# Patient Record
Sex: FEMALE | Race: WHITE | Employment: OTHER | URBAN - METROPOLITAN AREA
[De-identification: names, ages, dates, MRNs, and addresses within clinical notes are randomized per-mention and may not be internally consistent; named-entity substitution may affect disease eponyms.]

---

## 2017-03-08 ENCOUNTER — HOSPITAL ENCOUNTER (OUTPATIENT)
Age: 82
Setting detail: SPECIMEN
Discharge: HOME | End: 2017-03-08
Payer: MEDICARE

## 2017-03-10 LAB — DERMATOLOGY PATHOLOGY REPORT: NORMAL

## 2021-06-18 PROCEDURE — 99283 EMERGENCY DEPT VISIT LOW MDM: CPT

## 2021-06-19 ENCOUNTER — HOSPITAL ENCOUNTER (INPATIENT)
Age: 86
LOS: 1 days | Discharge: HOME | DRG: 884 | End: 2021-06-20
Attending: EMERGENCY MEDICINE | Admitting: FAMILY MEDICINE
Payer: MEDICARE

## 2021-06-19 ENCOUNTER — APPOINTMENT (OUTPATIENT)
Dept: CT IMAGING | Age: 86
DRG: 884 | End: 2021-06-19
Payer: MEDICARE

## 2021-06-19 ENCOUNTER — APPOINTMENT (OUTPATIENT)
Dept: GENERAL RADIOLOGY | Age: 86
DRG: 884 | End: 2021-06-19
Payer: MEDICARE

## 2021-06-19 DIAGNOSIS — R41.82 ALTERED MENTAL STATUS, UNSPECIFIED ALTERED MENTAL STATUS TYPE: Primary | ICD-10-CM

## 2021-06-19 PROBLEM — E55.9 VITAMIN D DEFICIENCY: Status: ACTIVE | Noted: 2021-06-19

## 2021-06-19 PROBLEM — I10 ESSENTIAL HYPERTENSION: Status: ACTIVE | Noted: 2021-06-19

## 2021-06-19 PROBLEM — R41.0 CONFUSION WITH NON-FOCAL NEURO EXAM: Status: ACTIVE | Noted: 2021-06-19

## 2021-06-19 PROBLEM — E78.5 HLD (HYPERLIPIDEMIA): Status: ACTIVE | Noted: 2021-06-19

## 2021-06-19 LAB
ABSOLUTE EOS #: 0.69 K/UL (ref 0–0.44)
ABSOLUTE IMMATURE GRANULOCYTE: 0.01 K/UL (ref 0–0.3)
ABSOLUTE LYMPH #: 1.7 K/UL (ref 1.1–3.7)
ABSOLUTE MONO #: 0.78 K/UL (ref 0.1–1.2)
ANION GAP SERPL CALCULATED.3IONS-SCNC: 12 MMOL/L (ref 9–17)
BASOPHILS # BLD: 1 % (ref 0–2)
BASOPHILS ABSOLUTE: 0.06 K/UL (ref 0–0.2)
BILIRUBIN URINE: NEGATIVE
BUN BLDV-MCNC: 15 MG/DL (ref 8–23)
BUN/CREAT BLD: 15 (ref 9–20)
CALCIUM SERPL-MCNC: 9.1 MG/DL (ref 8.6–10.4)
CHLORIDE BLD-SCNC: 103 MMOL/L (ref 98–107)
CHP ED QC CHECK: NORMAL
CO2: 25 MMOL/L (ref 20–31)
COLOR: YELLOW
COMMENT UA: NORMAL
CREAT SERPL-MCNC: 1.02 MG/DL (ref 0.5–0.9)
DIFFERENTIAL TYPE: ABNORMAL
EOSINOPHILS RELATIVE PERCENT: 12 % (ref 1–4)
ETHANOL PERCENT: <0.01 %
ETHANOL: <10 MG/DL
GFR AFRICAN AMERICAN: >60 ML/MIN
GFR NON-AFRICAN AMERICAN: 52 ML/MIN
GFR SERPL CREATININE-BSD FRML MDRD: ABNORMAL ML/MIN/{1.73_M2}
GFR SERPL CREATININE-BSD FRML MDRD: ABNORMAL ML/MIN/{1.73_M2}
GLUCOSE BLD-MCNC: 96 MG/DL (ref 70–99)
GLUCOSE URINE: NEGATIVE
HCT VFR BLD CALC: 40.4 % (ref 36.3–47.1)
HEMOGLOBIN: 12.3 G/DL (ref 11.9–15.1)
IMMATURE GRANULOCYTES: 0 %
KETONES, URINE: NEGATIVE
LEUKOCYTE ESTERASE, URINE: NEGATIVE
LYMPHOCYTES # BLD: 29 % (ref 24–43)
MCH RBC QN AUTO: 30.8 PG (ref 25.2–33.5)
MCHC RBC AUTO-ENTMCNC: 30.4 G/DL (ref 28.4–34.8)
MCV RBC AUTO: 101.3 FL (ref 82.6–102.9)
MONOCYTES # BLD: 13 % (ref 3–12)
NITRITE, URINE: NEGATIVE
NRBC AUTOMATED: ABNORMAL PER 100 WBC
PDW BLD-RTO: 13.3 % (ref 11.8–14.4)
PH UA: 6.5 (ref 5–8)
PLATELET # BLD: 287 K/UL (ref 138–453)
PLATELET ESTIMATE: ABNORMAL
PMV BLD AUTO: 9.6 FL (ref 8.1–13.5)
POTASSIUM SERPL-SCNC: 4.4 MMOL/L (ref 3.7–5.3)
PROTEIN UA: NEGATIVE
RBC # BLD: 3.99 M/UL (ref 3.95–5.11)
RBC # BLD: ABNORMAL 10*6/UL
SEG NEUTROPHILS: 46 % (ref 36–65)
SEGMENTED NEUTROPHILS ABSOLUTE COUNT: 2.73 K/UL (ref 1.5–8.1)
SODIUM BLD-SCNC: 140 MMOL/L (ref 135–144)
SPECIFIC GRAVITY UA: 1.02 (ref 1–1.03)
TURBIDITY: CLEAR
URINE HGB: NEGATIVE
UROBILINOGEN, URINE: NORMAL
WBC # BLD: 6 K/UL (ref 3.5–11.3)
WBC # BLD: ABNORMAL 10*3/UL

## 2021-06-19 PROCEDURE — 92523 SPEECH SOUND LANG COMPREHEN: CPT

## 2021-06-19 PROCEDURE — 87086 URINE CULTURE/COLONY COUNT: CPT

## 2021-06-19 PROCEDURE — 93880 EXTRACRANIAL BILAT STUDY: CPT

## 2021-06-19 PROCEDURE — 6360000002 HC RX W HCPCS: Performed by: NURSE PRACTITIONER

## 2021-06-19 PROCEDURE — G0480 DRUG TEST DEF 1-7 CLASSES: HCPCS

## 2021-06-19 PROCEDURE — 6370000000 HC RX 637 (ALT 250 FOR IP): Performed by: INTERNAL MEDICINE

## 2021-06-19 PROCEDURE — 80048 BASIC METABOLIC PNL TOTAL CA: CPT

## 2021-06-19 PROCEDURE — 1200000000 HC SEMI PRIVATE

## 2021-06-19 PROCEDURE — 2580000003 HC RX 258: Performed by: EMERGENCY MEDICINE

## 2021-06-19 PROCEDURE — 70450 CT HEAD/BRAIN W/O DYE: CPT

## 2021-06-19 PROCEDURE — 71045 X-RAY EXAM CHEST 1 VIEW: CPT

## 2021-06-19 PROCEDURE — 2580000003 HC RX 258: Performed by: NURSE PRACTITIONER

## 2021-06-19 PROCEDURE — 99222 1ST HOSP IP/OBS MODERATE 55: CPT | Performed by: INTERNAL MEDICINE

## 2021-06-19 PROCEDURE — 85025 COMPLETE CBC W/AUTO DIFF WBC: CPT

## 2021-06-19 PROCEDURE — 81003 URINALYSIS AUTO W/O SCOPE: CPT

## 2021-06-19 RX ORDER — CITALOPRAM 10 MG/1
10 TABLET ORAL DAILY
Status: DISCONTINUED | OUTPATIENT
Start: 2021-06-19 | End: 2021-06-20 | Stop reason: HOSPADM

## 2021-06-19 RX ORDER — SODIUM CHLORIDE 0.9 % (FLUSH) 0.9 %
10 SYRINGE (ML) INJECTION EVERY 12 HOURS SCHEDULED
Status: DISCONTINUED | OUTPATIENT
Start: 2021-06-19 | End: 2021-06-20 | Stop reason: HOSPADM

## 2021-06-19 RX ORDER — DONEPEZIL HYDROCHLORIDE 5 MG/1
5 TABLET, FILM COATED ORAL NIGHTLY
Status: ON HOLD | COMMUNITY
End: 2021-11-23

## 2021-06-19 RX ORDER — SODIUM CHLORIDE 9 MG/ML
INJECTION, SOLUTION INTRAVENOUS CONTINUOUS
Status: DISCONTINUED | OUTPATIENT
Start: 2021-06-19 | End: 2021-06-19

## 2021-06-19 RX ORDER — CITALOPRAM 10 MG/1
10 TABLET ORAL DAILY
COMMUNITY

## 2021-06-19 RX ORDER — SODIUM CHLORIDE 9 MG/ML
INJECTION, SOLUTION INTRAVENOUS CONTINUOUS
Status: DISCONTINUED | OUTPATIENT
Start: 2021-06-19 | End: 2021-06-19 | Stop reason: SDUPTHER

## 2021-06-19 RX ORDER — LISINOPRIL 40 MG/1
40 TABLET ORAL DAILY
Status: DISCONTINUED | OUTPATIENT
Start: 2021-06-19 | End: 2021-06-19 | Stop reason: SDUPTHER

## 2021-06-19 RX ORDER — ONDANSETRON 4 MG/1
4 TABLET, ORALLY DISINTEGRATING ORAL EVERY 8 HOURS PRN
Status: DISCONTINUED | OUTPATIENT
Start: 2021-06-19 | End: 2021-06-20 | Stop reason: HOSPADM

## 2021-06-19 RX ORDER — ONDANSETRON 2 MG/ML
4 INJECTION INTRAMUSCULAR; INTRAVENOUS EVERY 6 HOURS PRN
Status: DISCONTINUED | OUTPATIENT
Start: 2021-06-19 | End: 2021-06-20 | Stop reason: HOSPADM

## 2021-06-19 RX ORDER — SODIUM CHLORIDE 0.9 % (FLUSH) 0.9 %
10 SYRINGE (ML) INJECTION PRN
Status: DISCONTINUED | OUTPATIENT
Start: 2021-06-19 | End: 2021-06-20 | Stop reason: HOSPADM

## 2021-06-19 RX ORDER — CLONIDINE HYDROCHLORIDE 0.1 MG/1
0.1 TABLET ORAL NIGHTLY PRN
Status: ON HOLD | COMMUNITY
End: 2021-11-23

## 2021-06-19 RX ORDER — VITAMIN B COMPLEX
400 TABLET ORAL DAILY
Status: DISCONTINUED | OUTPATIENT
Start: 2021-06-19 | End: 2021-06-20 | Stop reason: HOSPADM

## 2021-06-19 RX ORDER — SODIUM CHLORIDE 9 MG/ML
25 INJECTION, SOLUTION INTRAVENOUS PRN
Status: DISCONTINUED | OUTPATIENT
Start: 2021-06-19 | End: 2021-06-20 | Stop reason: HOSPADM

## 2021-06-19 RX ORDER — ATORVASTATIN CALCIUM 20 MG/1
20 TABLET, FILM COATED ORAL DAILY
Status: DISCONTINUED | OUTPATIENT
Start: 2021-06-19 | End: 2021-06-20 | Stop reason: HOSPADM

## 2021-06-19 RX ORDER — LISINOPRIL 40 MG/1
40 TABLET ORAL DAILY
Status: DISCONTINUED | OUTPATIENT
Start: 2021-06-19 | End: 2021-06-20 | Stop reason: HOSPADM

## 2021-06-19 RX ADMIN — SODIUM CHLORIDE: 9 INJECTION, SOLUTION INTRAVENOUS at 00:45

## 2021-06-19 RX ADMIN — Medication 500 UNITS: at 12:20

## 2021-06-19 RX ADMIN — SODIUM CHLORIDE: 9 INJECTION, SOLUTION INTRAVENOUS at 07:47

## 2021-06-19 RX ADMIN — ENOXAPARIN SODIUM 40 MG: 40 INJECTION SUBCUTANEOUS at 12:20

## 2021-06-19 RX ADMIN — SODIUM CHLORIDE, PRESERVATIVE FREE 10 ML: 5 INJECTION INTRAVENOUS at 21:37

## 2021-06-19 RX ADMIN — CITALOPRAM HYDROBROMIDE 10 MG: 10 TABLET ORAL at 12:20

## 2021-06-19 RX ADMIN — LISINOPRIL 40 MG: 40 TABLET ORAL at 12:20

## 2021-06-19 ASSESSMENT — ENCOUNTER SYMPTOMS
BLOOD IN STOOL: 0
PHOTOPHOBIA: 0
WHEEZING: 0
VOMITING: 0
NAUSEA: 0
COUGH: 0
ABDOMINAL DISTENTION: 0
SHORTNESS OF BREATH: 0

## 2021-06-19 ASSESSMENT — PAIN SCALES - GENERAL: PAINLEVEL_OUTOF10: 0

## 2021-06-19 NOTE — ED PROVIDER NOTES
[address] [place] [address] ED  EMERGENCY DEPARTMENT ENCOUNTER      Pt Name: Carmen Jack  MRN: [id number]  [place] 1935  Date of evaluation: 6/18/2021  Provider: Andrey Arellano MD    [address] [place] [address]       Chief Complaint   Patient presents with    Altered Mental Status         HISTORY OF PRESENT ILLNESS  (Location/Symptom, Timing/Onset, Context/Setting, Quality, Duration, Modifying Factors, Severity.)   Carmen Jack is a 81 y.o. female who presents to the emergency department for evaluation of altered mental status. The patient is unaccompanied to the emergency department. She was brought by police. We do not have a good history on her, there are no family members and she is unable to provide us a good history. She had apparently called the police tonight and they checked on her. She thought somebody had broken into her house but they had not and she was brought here because of the  recognized that she was confused. No history of trauma and she does not complain of fever or cough or headache. Its not clear when the symptoms started. No further history is obtainable. Nursing Notes were reviewed. ALLERGIES     Patient has no known allergies. CURRENT MEDICATIONS       Previous Medications    CARBAMIDE PEROXIDE (DEBROX) 6.5 % OTIC SOLUTION    5 drops 2 times daily    HYDROCHLOROTHIAZIDE (HYDRODIURIL) 25 MG TABLET    Take 25 mg by mouth daily    LISINOPRIL (PRINIVIL;ZESTRIL) 40 MG TABLET    Take 40 mg by mouth daily    SIMVASTATIN (ZOCOR) 40 MG TABLET    Take 40 mg by mouth nightly       PAST MEDICAL HISTORY         Diagnosis Date    Hypertension        SURGICAL HISTORY           Procedure Laterality Date    APPENDECTOMY      TUBAL LIGATION           FAMILY HISTORY     History reviewed. No pertinent family history. No family status information on file. SOCIAL HISTORY      reports that she has never smoked.  She has never used smokeless tobacco. She reports current alcohol Complete antibiotic course.  Monitor for spreading rash, fever, body aches, joint pain, etc.      Lyme Disease  Lyme disease is caused by bacteria. The infection is most often passed during the bite of a deer tick. The tick is very small, so many people with Lyme disease do not know they have been bitten. Tests for Lyme disease are not always accurate early in the disease. If the disease is suspected, treatment may begin before testing confirms the infection. A long course of antibiotics is the standard treatment.  If untreated, Lyme disease can worsen and full-body symptoms can develop         Early local symptoms may appear within a few days to a month after the tick bite. These symptoms may include a round, red rash that looks like a bull's-eye target with darker outer ring and a darker center. There may fever, chills, fatigue, body aches, and headache. In time, the rash goes away, even without treatment. That doesn't mean the infection has gone away, however. In some cases, early local symptoms never develop.    Early disseminated symptoms may appear weeks to months after the bite. These can include muscle aches, fatigue, fever, headache, stiff neck, and joint pain and swelling.    Late-stage symptoms include weakness in an arm, leg or one side of the face, headache, fever, and numbness and tingling in the arms or legs, confusion, and memory loss.  Testing is done for the presence of the bacteria. When the infection is treated early, it can be cured. In some cases, a second or third course of antibiotics may be needed. Be sure to follow your healthcare providers directions about treatment.  Home care  If oral antibiotics have been prescribed, take them exactly as directed until they are completely gone. Do not stop taking them until you have taken the full course or your healthcare provider has told you to stop.  Ask your healthcare provider about taking over-the-counter medicines to control symptoms such as  aches and fever.  Follow-up care  Follow up with your healthcare provider as advised. Be sure to return for follow-up testing as directed to be sure the infection has been treated.  When to seek medical advice  Call your healthcare provider right away if any of the following occur:    Current symptoms get worse    Unexplained fever, neck pain or stiffness, or headache    Arm, leg or facial weakness    Joint pain or swelling    Numbness and tingling in the arms or legs    Confusion or memory loss    Irregular or rapid heartbeat  Date Last Reviewed: 9/25/2015 2000-2017 [employer]. [address] [place] [id number]. All rights reserved. This information is not intended as a substitute for professional medical care. Always follow your healthcare professional's instructions.        Preventing Lyme Disease  Ticks are small spider-like arachnids that feed on the blood of animals and humans. They can carry the bacteria that cause Lyme disease. The bacteria can pass to a person from a tick bite. (It is not passed from person to person.) Lyme disease can lead to serious health problems if it is not treated with antibiotics. The best way to prevent Lyme disease is to avoid being bitten by a tick.     The tick that carries Lyme disease is very small about the size of a poppy seed.   Preventing tick bites  The disease is carried by the blacklegged tick, also called a  deer tick.  Young ticks called nymphs are the most likely to carry the bacteria. They are very small about the size of a poppy seed. They can be found on deer, rodents, and birds. Often the animal brushes the tick onto leaves or other plants as it runs through the woods and then the tick lives in bushes, grasses, and dead leaves. The most active time of year for infected ticks varies by region of the country. In the East and Midwest, they are more active from April to September. In the West, they may be more active from December to  February. But you can still be bitten at other times of the year. Below are tips for protecting yourself from tick bites.  Protect your body    Wear clothes that protect you. Clothing can help protect you from tick bites. Wear long pants and long sleeves in outdoor areas where ticks may live. Tuck your shirt into your pants. Tuck pant legs into your socks. And wear light colors so you can more easily see ticks on your clothes.    Use insect repellent. Spray insect repellent containing at least 20 percent DEET on your exposed skin. You can also use it on clothing, shoes, and camping gear. Avoid getting DEET on children s hands, mouth, or eyes. You can use products with permethrin on clothing, shoes, and camping gear. But do not spray permethrin on skin. It will cause a rash. Follow the directions on the package of the spray you use. For more information on bug sprays, visit the National Pesticide Information Center at http://npic.[facility].    Avoid tick-infested areas. Avoid brushing against grasses, bushes, and other plants. Avoid walking through dead leaves and other ground vegetation. Do not sit on fallen logs. And avoid areas with large numbers of deer and rodents.    Check yourself for ticks. After being outdoors, check your clothes and skin for ticks. Keep in mind that the ticks are about the size of a poppy seed. Use both a hand-held and a full-length mirror to view all of your skin. Pay special attention to areas with hair. Make sure to thoroughly check these areas:    Scalp    Behind the ears    Armpits    Belly button    Waist    Groin    Backs of the knees    Use the clothes dryer. Putting clothing or bedding into a clothes dryer for 1 hour at high heat has been shown to kill ticks.  Control ticks around your home    Create tick-free safety zones. Ticks that transmit Lyme disease live in ground vegetation. Ticks crawl onto people from shrubs and grasses in and around wooded areas. Cut bushes and other  cardiologist.    EKG on my interpretation shows no acute findings    RADIOLOGY:   Non-plain film images such as CT, Ultrasound and MRI are read by the radiologist. Plain radiographic images are visualized and preliminarily interpreted by the emergency physician with the below findings:    Interpretation per the Radiologist below, if available at the time of this note:    CT HEAD WO CONTRAST    Result Date: 6/19/2021  EXAMINATION: CT OF THE HEAD WITHOUT CONTRAST  6/19/2021 1:05 am TECHNIQUE: CT of the head was performed without the administration of intravenous contrast. Dose modulation, iterative reconstruction, and/or weight based adjustment of the mA/kV was utilized to reduce the radiation dose to as low as reasonably achievable. COMPARISON: CT head April 25, 2015. HISTORY: ORDERING SYSTEM PROVIDED HISTORY: Altered mental status TECHNOLOGIST PROVIDED HISTORY: Altered mental status Decision Support Exception - unselect if not a suspected or confirmed emergency medical condition->Emergency Medical Condition (MA) Reason for Exam: Brought to ED by police after calling them c/o hearing sounds. A+O to self only. Concern for safety with increased confusion. H/O HTN Acuity: Acute Type of Exam: Initial FINDINGS: BRAIN/VENTRICLES: There is no acute intracranial hemorrhage, mass effect or midline shift. No abnormal extra-axial fluid collection. The gray-white differentiation is maintained without evidence of an acute infarct. There is no evidence of hydrocephalus. Ill-defined hypoattenuation is noted within cerebral white matter consistent with mild microvascular ischemic disease. ORBITS: The visualized portion of the orbits demonstrate no acute abnormality. SINUSES: The visualized paranasal sinuses and mastoid air cells demonstrate no acute abnormality. SOFT TISSUES/SKULL:  No acute abnormality of the visualized skull or soft tissues. No acute intracranial abnormality.  Mild cerebral white matter chronic microvascular plants away from the deck or patio and any child play areas. Keep all grasses cut short. Remove dead leaves and other dead vegetation. Do not put children s play equipment near wooded areas. Put wood chips or gravel on the ground between lawns and wooded areas.    Use pesticides. You can apply them yourself or hire a pest control expert. States have different regulations about pesticides. Ask your local health department for information.    Keep deer away. Deer often carry the ticks that can infect you with Lyme disease. Do not attempt to pet or feed deer. Ask your local [place] center about deer-resistant plants. Ask your local health department about deer control in your area.    Prevent ticks on pets. Pets can bring ticks into your home. Use tick control medicine as advised by your . Check your pet for ticks after it comes indoors. Pay special attention to the ears.    Ask about local tick-control methods. Some states have tick-control programs. Local health departments may be using methods that can help you control ticks at home.  If you have a tick  If you find a tick on your skin, do not panic. Most ticks don't carry Lyme disease. And the tick must be attached for 36 to 48 hours before it might infect you. If you find a tick on you, here s what to do:    If the tick is not yet attached to your skin, remove the tick with tweezers or a tissue. Flush it down the toilet. If you see a tick on your clothes, use a piece of tape to lift it off. Do not touch it with your bare hands.    If the tick is attached to your skin:    Carefully remove the tick with tweezers. If you don t have tweezers, use your fingers protected by a paper towel or a thin cloth. Grasp the tick as close to your skin as possible. Pull slowly and gently to remove the tick. The tick may not let go right away. Do not pull harder. Be patient and keep trying gently. Do not twist the head or body as you pull. Do not crush or squeeze the  body. This can release the bacteria into your body. Never use a hot match, petroleum jelly, or other products to remove a tick. (If you can t remove the tick or if part of the tick remains in the skin, call your healthcare provider right away.)    Wash your skin with soap and water after you remove the tick. This will help ensure you remove any bacteria.    If you can, save the tick in a tightly sealed glass or plastic container. Take it to your healthcare provider. He or she may be able to have someone identify if it is the type of tick that transmits Lyme.    Call your healthcare provider and describe the bite and the tick. You may be asked to come in for an exam. You may be tested for Lyme. You may also be prescribed antibiotics to help prevent infection.    Over the next month, watch for the symptoms below, especially a rash at the site of the bite.  Symptoms of Lyme disease  Call your healthcare provider if you develop any symptoms of Lyme disease, even if you don t remember being bitten. These include:    A round, red rash (called a bull s-eye rash)    Fever and chills    Tiredness or body aches    Headache or a stiff neck    Joint pain and swelling (arthritis), especially in large joints such as the knees   To learn more    Centers for Disease Control and Prevention: www.cdc.gov/lyme    American Lyme Disease Foundation: www.aldf.com  Date Last Reviewed: 11/1/2016 2000-2017 [employer]. [address] [place] [address], [place] [address]. All rights reserved. This information is not intended as a substitute for professional medical care. Always follow your healthcare professional's instructions.         work-up is negative including CAT scan of the brain. She is being admitted for further work-up. CONSULTS:  IP CONSULT TO HOSPITALIST    PROCEDURES:  None    FINAL IMPRESSION      1. Altered mental status, unspecified altered mental status type          DISPOSITION/PLAN   DISPOSITION Decision To Admit 06/19/2021 02:29:10 AM      PATIENT REFERRED TO:   No follow-up provider specified.     DISCHARGE MEDICATIONS:     New Prescriptions    No medications on file         (Please note that portions of this note were completed with a voice recognition program.  Efforts were made to edit the dictations but occasionally words are mis-transcribed.)    Aimee Bond MD  Attending Emergency Physician           Aimee Bond MD  06/19/21 0663

## 2021-06-19 NOTE — PROGRESS NOTES
Speech Language Pathology  Facility/Department: [facility]  Initial Speech/Language/Cognitive Assessment    NAME: Figueroa Armas  : 1935   MRN: [id number]  ADMISSION DATE: 2021     ADMITTING DIAGNOSIS: has Altered mental status; Confusion with non-focal neuro exam; Essential hypertension; HLD (hyperlipidemia); and Vitamin D deficiency on their problem list.     DATE ONSET: 2021      Date of Eval: 2021   Evaluating Therapist: LEIGHA South       RECENT RESULTS  CT OF HEAD/MRI:  [facility] 2021  No acute intracranial abnormality.       Mild cerebral white matter chronic microvascular ischemic disease. Primary Complaint:   Figueroa Armas is a 81 y.o. female who presents to the emergency department for evaluation of altered mental status    According to medical records, pt was brought to [facility] by the [facility] after they responded to call at her house (she thought someone broke in) and the  observed confusion/AMS. On assessment, pt not able to provide details as to what lead to her admission. IMPRESSIONS  1) Pt exhibits moderate cognitive deficits in attention/concentration, immediate/delayed recall, divergent thinking/reasoning skills    2) This is also complicated by significant hearing loss (no observable hearing aides)    3) Clinically, pt appears to be exhibiting WFL oral-pharyngeal swallowing for dry solids and thin liquids. RECOMMENDATIONS  1) Given severity of cognitive impairments, pt needs supervision for managing personal, medical, financial, and legal affairs.      2) She should have 24 hour supervision d/t the severity of the cognitive impairments     3) Pt will benefit from skilled ST/cognitive rehabilitation at the next level of care          Pain:  Pain Assessment  Pain Assessment: 0-10  Pain Level: 0    Assessment:  Cognitive Diagnosis: Moderate Cognitive Deficits   Diagnosis: Moderate Cognitive Deficits, Hard of HEaring    Recommendations:  Requires SLP Intervention: Yes     D/C Recommendations: To be determined       Plan:   Goals:      Patient/family involved in developing goals and treatment plan: Pt is pleasantly confused. She is unable to recall how many children she has or their names      Subjective:   Previous level of function and limitations: Per pt report (not sure of accuracy), she reports she is single (she denied being  or  or ). She said she had 5 children but can only name 1 (Yunior Tomlinson; However medical records appear to suggest Loli Deng may be her grandson). She reports she graduated high school and worked at [employer] prior to having children. 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 Objective:     Oral/Motor  Oral Motor: Within functional limits    Auditory Comprehension  Comprehension: Within Functional Limits         Expression  Primary Mode of Expression: Verbal    Verbal Expression  Verbal Expression: Within functional limits         Motor Speech  Motor Speech: Within Functional Limits         Cognition:      Orientation  Overall Orientation Status: Impaired  Orientation Level: Oriented to place;Oriented to person;Disoriented to time;Disoriented to situation  Attention  Attention: Exceptions to [facility]  Sustained Attention: Mild  Memory  Memory: Exceptions to [facility]  Short-term Memory: Moderate  Working Memory: Mild  Problem Solving  Problem Solving: Exceptions to [facility]  Verbal Reasoning Skills:  Moderate      Prognosis:  Speech Therapy Prognosis  Prognosis: Fair  Prognosis Considerations: Age;Previous Level of Function;Severity of Impairments    Education:  Patient Education Response: Demonstrated understanding;Needs reinforcement          Therapy Time:   Individual Concurrent Group Co-treatment   Time In 1210         Time Out 1244         Minutes [address] [place], [place]  6/19/2021 12:45 PM

## 2021-06-19 NOTE — ED NOTES
Pt to ED c/o altered mental status. Pt was brought in by  after calling them reporting she was hearing sounds.  was concerned and brought her to the hospital to be evaluated. Pt reports that she lives at home alone. Pt is only oriented to self, pt presents to be confused and unable to provide any additional information. Pt denies pain at this time.       Maliha Barragan, RN  06/19/21 9902

## 2021-06-19 NOTE — PROGRESS NOTES
Transitions of Care Pharmacy Service   Medication Review    The patient's list of current home medications has been reviewed. Source(s) of information: Gillian      PROVIDER ACTION REQUESTED  Medications that need to be addressed by a physician/nurse practitioner:    Medication Action Requested   Lisinopril,  Simvastatin   Old prescriptions -- please discontinue as appropriate, otherwise pt will need new prescriptions to resume at discharge. Donepezil and clonidine need review/reorder as appropriate           Please feel free to call me with any questions about this encounter. Thank you.     Jannette Barnes, [address] [place] [address]   Transitions of Care Pharmacy Service  Phone:  [phone]  Fax: [phone]      Electronically signed by Jnanette Barnes [address] [place] [address] on 6/19/2021 at 11:03 AM         Medications Prior to Admission:   citalopram (CELEXA) 10 MG tablet, Take 10 mg by mouth daily  donepezil (ARICEPT) 5 MG tablet, Take 5 mg by mouth nightly  cloNIDine (CATAPRES) 0.1 MG tablet, Take 0.1 mg by mouth nightly as needed

## 2021-06-19 NOTE — CARE COORDINATION
Discharge Planning    Received call back from [facility] and they can not accept patient as they have concerns that she does not have a 24 hr care giver.

## 2021-06-19 NOTE — H&P
[facility]  Office: [address], DO, Jaron Milian, DO, Jono Vitor, DO, Demond Herron, DO, Valente Cummings MD, Shanita Marino MD, Fabiana Whitley MD, Bonnielee Nissen, MD, Melody Ramos MD, Valeriano Butterfield MD, Ursula Silevr MD, Pa Brar MD, Eduardo Mosher, DO, Elicia Rothman MD, Keturah Lopez, DO, Denise Burroughs MD,  Frankie Alarcon, DO, Katy Adams MD, Anshul Lim MD, Geremias Tavarez MD, Arsenio Orantes MD, Jessica Morley, Caryl Weiss, CNP, Roya Justice, CNP, Holyl Dennis, CNS, Pia Cline, CNP, Alejo Lee, CNP, Arnold Addison, CNP, Justa Bunch, CNP, Vish Light, CNP, Marc Olvera PA-C, Iam Paul, [facility], Carl Harris, CNP, Js Galindo, CNP, Renae Rosario, CNP, Maximilian Frederick, CNP, Patricia Moreno, CNP, Demond [facility]          Name: Lisa Valentin  MRN: [id number]     Acct: [de-identified]  Room: 2009/2009-02    Admit Date: 6/19/2021  PCP: Leila Duran MD    Chief Complaint:  Chief Complaint   Patient presents with    Altered Mental Status        History of Present Illness:  Lisa Valentin is a 81 y.o.  female who presents with Altered Mental Status    Patient was admitted thru ER with:  Gina Bedoya is a 81 y.o. female who presents to the emergency department for evaluation of altered mental status. The patient is unaccompanied to the emergency department. She was brought by police. We do not have a good history on her, there are no family members and she is unable to provide us a good history. She had apparently called the police tonight and they checked on her. She thought somebody had broken into her house but they had not and she was brought here because of the  recognized that she was confused. No history of trauma and she does not complain of fever or cough or headache. Its not clear when the symptoms started. No further history is obtainable. \"    Patient MD   hydrochlorothiazide (HYDRODIURIL) 25 MG tablet Take 25 mg by mouth daily    Historical Provider, MD         Allergies:   Patient has no known allergies. Social Hx:  Tobacco:    reports that she has never smoked. She has never used smokeless tobacco.  Alcohol:      reports current alcohol use of about 1.0 standard drinks of alcohol per week. Drug Use:  reports no history of drug use. Family Hx; Family History   Problem Relation Age of Onset    No Known Problems Mother     Heart Disease Father        ROS:  Review of Systems   Constitutional: Positive for activity change (Decreased). Negative for appetite change, chills, diaphoresis, fatigue and fever. HENT: Negative for congestion and nosebleeds. Eyes: Negative for photophobia and visual disturbance. Respiratory: Negative for cough, shortness of breath and wheezing. Cardiovascular: Negative for chest pain and palpitations. Gastrointestinal: Negative for abdominal distention, blood in stool, nausea and vomiting. Genitourinary: Positive for dysuria (Has recently developed some pain on urination). Negative for flank pain and hematuria. Musculoskeletal: Negative for arthralgias and myalgias. Skin: Negative for rash and wound. Neurological: Positive for weakness. Negative for dizziness and light-headedness. The patient reports she has been feeling \"shaky\"   Psychiatric/Behavioral: Positive for confusion (She does acknowledge poor memory) and dysphoric mood. The patient is nervous/anxious. The patient acknowledges that at times she is feeling somewhat depressed and anxious       Physical Exam:  Vitals:  BP (!) 176/82   Pulse 78   Temp 97.9 °F (36.6 °C) (Oral)   Resp 16   Ht 5' 7\" (1.702 m)   Wt 113 lb 0.5 oz (51.3 kg)   SpO2 97%   BMI 17.70 kg/m²   Temp (24hrs), Av.8 °F (36.6 °C), Min:97.5 °F (36.4 °C), Max:98.1 °F (36.7 °C)    Physical Exam  Vitals reviewed.    Constitutional:       General: She is not in acute distress. Appearance: She is not diaphoretic. HENT:      Head: Normocephalic. Nose: Nose normal.   Eyes:      General: No scleral icterus. Conjunctiva/sclera: Conjunctivae normal.   Neck:      Trachea: No tracheal deviation. Cardiovascular:      Rate and Rhythm: Normal rate and regular rhythm. Pulmonary:      Effort: Pulmonary effort is normal. No respiratory distress. Breath sounds: Normal breath sounds. No wheezing or rales. Chest:      Chest wall: No tenderness. Abdominal:      General: Bowel sounds are normal. There is no distension. Palpations: Abdomen is soft. Tenderness: There is no abdominal tenderness. Musculoskeletal:         General: No tenderness. Cervical back: Neck supple. Skin:     General: Skin is warm and dry. Neurological:      Mental Status: She is alert.       Comments: Pleasantly confused  Having trouble with historical data  Year \"unable to gas what year it might be\"  Location \"I do not know\"  President \"Biden\"           Data:  Laboratory Testing:  Recent Results (from the past 24 hour(s))   CBC Auto Differential    Collection Time: 06/19/21 12:30 AM   Result Value Ref Range    WBC 6.0 3.5 - 11.3 k/uL    RBC 3.99 3.95 - 5.11 m/uL    Hemoglobin 12.3 11.9 - 15.1 g/dL    Hematocrit 40.4 36.3 - 47.1 %    .3 82.6 - 102.9 fL    MCH 30.8 25.2 - 33.5 pg    MCHC 30.4 28.4 - 34.8 g/dL    RDW 13.3 11.8 - 14.4 %    Platelets 420 235 - 641 k/uL    MPV 9.6 8.1 - 13.5 fL    NRBC Automated NOT REPORTED 0.0 per 100 WBC    Differential Type NOT REPORTED     WBC Morphology NOT REPORTED     RBC Morphology NOT REPORTED     Platelet Estimate NOT REPORTED     Seg Neutrophils 46 36 - 65 %    Lymphocytes 29 24 - 43 %    Monocytes 13 (H) 3 - 12 %    Eosinophils % 12 (H) 1 - 4 %    Basophils 1 0 - 2 %    Immature Granulocytes 0 0 %    Segs Absolute 2.73 1.50 - 8.10 k/uL    Absolute Lymph # 1.70 1.10 - 3.70 k/uL    Absolute Mono # 0.78 0.10 - 1.20 k/uL    Absolute Eos been discussed with the patient and her grandson/caregiver Christopher Murphy  He feels the patient may need placement  He has a service called to have the patient's home air conditioning repaired   has been consulted     Patient is admitted as inpatient status because of co-morbidities listed above, severity of signs and symptoms as outlined, requirement for current medical therapies and most importantly because of direct risk to patient if care not provided in a hospital setting. Expected length of stay > 48 hours.      Consultations:   IP CONSULT TO HOSPITALIST  IP CONSULT TO SOCIAL WORK    Electronically signed by Shankar Agarwal DO on 6/19/2021 at 9:11 AM

## 2021-06-19 NOTE — PLAN OF CARE
Problem: Falls - Risk of:  Goal: Will remain free from falls  Description: Will remain free from falls  Outcome: Ongoing  Note: Siderails up x 2  Hourly rounding  Call light in reach  Instructed to call for assist before attempting out of bed. Remains free from falls and accidental injury at this time   Floor free from obstacles  Bed is locked and in lowest position  Adequate lighting provided  Bed alarm on, [place] star and Stay with Me signs posted         Problem: Sensory:  Goal: General experience of comfort will improve  Description: General experience of comfort will improve  Outcome: Ongoing  Note: Pt. Complaining of urinary burning and urgency  Patient frequently to bathroom to void  Await urinalysis.

## 2021-06-19 NOTE — CARE COORDINATION
Case Management Initial Discharge  [facility],         Readmission Risk              Risk of Unplanned Readmission:  7             Met with:patient to discuss discharge plans. Information verified: address, contacts, phone number, , insurance Yes  PCP: Kai Moyer MD  Date of last visit: New PCP is Dr Jessica Grossman last saw May 2021. Registration called to update    Insurance Provider: Nay[employer] Cuello [employer] Medicare    Discharge Planning  Current Residence:  Private home  Living Arrangements:  Alone   Home has 1 stories/2 stairs to climb  Support Systems:  Children  Current Services PTA: [employer] Agency: Previous use of [facility]  Patient able to perform ADL's:Assisted  DME in home:  Samir Freddy, cane  DME used to aid ambulation prior to admission:   None in the home. Samir Freddy outside home  DME used during admission:  TBD    Potential Assistance Needed:  [address]:    Potential Assistance Purchasing Medications:  No  Does patient want to participate in local refill/ meds to beds program?       Patient agreeable to home care: Yes  Freedom of choice provided:  yes  The Plan for Transition of Care is related to the following treatment goals: possible need for PT/OT for strengthening and SN for medication teaching and compliance    Migue Serrano was provided with a choice of provider and agrees   with the discharge plan. [x] Yes [] No    Freedom of choice list was provided with basic dialogue that supports the patient's individualized plan of care/goals, treatment preferences and shares the quality data associated with the providers.  [x] Yes [] No      Type of Home Care Services:  Nursing Services, OT, PT  Patient expects to be discharged to:  home    Prior SNF/Rehab Placement and Facility: none  Agreeable to SNF/Rehab: No  [place] of choice provided: n/a   Evaluation: n/a    Expected Discharge date:  21  Follow Up Appointment: Best Day/ Time: Monday AM    Transportation provider: migue Kiersten Godoy  Transportation arrangements needed for discharge: No    Discharge Plan:   Met with patient to review plan of care. Pt was able to state her address and that she lives alone and receives mobile meals. She told me she has a daughter who lives in [facility] and her son Kiersten Godoy helps her. She was unable to state the year and where she is at and why she is here. She gave me permission to call her son Kiersten Godoy. Phone call to Kiersten Godoy and he states his mom sees a neurologist for dementia. He states she can get anxious in the evenings and she attempted to call him and his girlfriend but their phones were on mute so since they did not answer their phones patient called the police. Son states this happened 4 months ago and he has been working with [employer] on alternate living situation. Kiersten Godoy visits his mom daily to give her her medications. He has concerns that she may have a UTI and nurse states she is obtaining a urine specimen today. Pt does not have a Life Alert. Son would be interested in home care and he requests [facility] from previous use. Face sheet faxed to [facility]  PT nattyal is pending. PLAN  Confirm acceptance with [facility]. PT eval pending. UA pending.         Electronically signed by Jess Morrison on 6/19/21 at 10:00 AM EDT

## 2021-06-19 NOTE — PROGRESS NOTES
Pt admitted to room 2009 per bed in stable/confused condition from ED  Oriented to room and surroundings  Bed in lowest position, wheels locked, 2/4 side rails up  Call light in reach, room free of clutter, adequate lighting provided  Denies any further questions at this time  Instructed to call out with any questions/concerns/new onset of pain and/or n/v   White board updated  Continue to monitor with hourly rounding  STAY WITH ME protocol initiated   Bed alarm on/Fall Risk signs in place/Fall risk sticker to wrist band  Non-skid socks on/at bedside.

## 2021-06-20 VITALS
TEMPERATURE: 97.3 F | OXYGEN SATURATION: 95 % | WEIGHT: 113.03 LBS | SYSTOLIC BLOOD PRESSURE: 174 MMHG | BODY MASS INDEX: 17.74 KG/M2 | HEIGHT: 67 IN | HEART RATE: 76 BPM | RESPIRATION RATE: 18 BRPM | DIASTOLIC BLOOD PRESSURE: 88 MMHG

## 2021-06-20 LAB
ALBUMIN SERPL-MCNC: 3.7 G/DL (ref 3.5–5.2)
ALBUMIN/GLOBULIN RATIO: ABNORMAL (ref 1–2.5)
ALP BLD-CCNC: 61 U/L (ref 35–104)
ALT SERPL-CCNC: 6 U/L (ref 5–33)
ANION GAP SERPL CALCULATED.3IONS-SCNC: 12 MMOL/L (ref 9–17)
AST SERPL-CCNC: 14 U/L
BILIRUB SERPL-MCNC: 0.43 MG/DL (ref 0.3–1.2)
BUN BLDV-MCNC: 17 MG/DL (ref 8–23)
BUN/CREAT BLD: 17 (ref 9–20)
CALCIUM SERPL-MCNC: 8.7 MG/DL (ref 8.6–10.4)
CHLORIDE BLD-SCNC: 104 MMOL/L (ref 98–107)
CHOLESTEROL/HDL RATIO: 2.9
CHOLESTEROL: 180 MG/DL
CO2: 23 MMOL/L (ref 20–31)
CREAT SERPL-MCNC: 1.01 MG/DL (ref 0.5–0.9)
CULTURE: NO GROWTH
GFR AFRICAN AMERICAN: >60 ML/MIN
GFR NON-AFRICAN AMERICAN: 52 ML/MIN
GFR SERPL CREATININE-BSD FRML MDRD: ABNORMAL ML/MIN/{1.73_M2}
GFR SERPL CREATININE-BSD FRML MDRD: ABNORMAL ML/MIN/{1.73_M2}
GLUCOSE BLD-MCNC: 90 MG/DL (ref 70–99)
HCT VFR BLD CALC: 39.1 % (ref 36.3–47.1)
HDLC SERPL-MCNC: 62 MG/DL
HEMOGLOBIN: 12.1 G/DL (ref 11.9–15.1)
LDL CHOLESTEROL: 96 MG/DL (ref 0–130)
Lab: NORMAL
MCH RBC QN AUTO: 30.6 PG (ref 25.2–33.5)
MCHC RBC AUTO-ENTMCNC: 30.9 G/DL (ref 28.4–34.8)
MCV RBC AUTO: 99 FL (ref 82.6–102.9)
NRBC AUTOMATED: 0 PER 100 WBC
PDW BLD-RTO: 13.2 % (ref 11.8–14.4)
PLATELET # BLD: 260 K/UL (ref 138–453)
PMV BLD AUTO: 9.5 FL (ref 8.1–13.5)
POTASSIUM SERPL-SCNC: 4 MMOL/L (ref 3.7–5.3)
RBC # BLD: 3.95 M/UL (ref 3.95–5.11)
SODIUM BLD-SCNC: 139 MMOL/L (ref 135–144)
SPECIMEN DESCRIPTION: NORMAL
TOTAL PROTEIN: 6.2 G/DL (ref 6.4–8.3)
TRIGL SERPL-MCNC: 110 MG/DL
VLDLC SERPL CALC-MCNC: NORMAL MG/DL (ref 1–30)
WBC # BLD: 5.4 K/UL (ref 3.5–11.3)

## 2021-06-20 PROCEDURE — 97166 OT EVAL MOD COMPLEX 45 MIN: CPT

## 2021-06-20 PROCEDURE — 97530 THERAPEUTIC ACTIVITIES: CPT

## 2021-06-20 PROCEDURE — 6370000000 HC RX 637 (ALT 250 FOR IP): Performed by: INTERNAL MEDICINE

## 2021-06-20 PROCEDURE — 83036 HEMOGLOBIN GLYCOSYLATED A1C: CPT

## 2021-06-20 PROCEDURE — 6360000002 HC RX W HCPCS: Performed by: NURSE PRACTITIONER

## 2021-06-20 PROCEDURE — 85027 COMPLETE CBC AUTOMATED: CPT

## 2021-06-20 PROCEDURE — 97162 PT EVAL MOD COMPLEX 30 MIN: CPT

## 2021-06-20 PROCEDURE — 97116 GAIT TRAINING THERAPY: CPT

## 2021-06-20 PROCEDURE — 80061 LIPID PANEL: CPT

## 2021-06-20 PROCEDURE — 2580000003 HC RX 258: Performed by: NURSE PRACTITIONER

## 2021-06-20 PROCEDURE — 99238 HOSP IP/OBS DSCHRG MGMT 30/<: CPT | Performed by: FAMILY MEDICINE

## 2021-06-20 PROCEDURE — 80053 COMPREHEN METABOLIC PANEL: CPT

## 2021-06-20 PROCEDURE — 36415 COLL VENOUS BLD VENIPUNCTURE: CPT

## 2021-06-20 RX ORDER — CLONIDINE HYDROCHLORIDE 0.1 MG/1
0.1 TABLET ORAL 2 TIMES DAILY
Status: DISCONTINUED | OUTPATIENT
Start: 2021-06-20 | End: 2021-06-20 | Stop reason: HOSPADM

## 2021-06-20 RX ADMIN — LISINOPRIL 40 MG: 40 TABLET ORAL at 08:48

## 2021-06-20 RX ADMIN — ENOXAPARIN SODIUM 40 MG: 40 INJECTION SUBCUTANEOUS at 08:49

## 2021-06-20 RX ADMIN — CITALOPRAM HYDROBROMIDE 10 MG: 10 TABLET ORAL at 08:48

## 2021-06-20 RX ADMIN — ATORVASTATIN CALCIUM 20 MG: 20 TABLET, FILM COATED ORAL at 08:48

## 2021-06-20 RX ADMIN — Medication 500 UNITS: at 08:52

## 2021-06-20 RX ADMIN — SODIUM CHLORIDE, PRESERVATIVE FREE 10 ML: 5 INJECTION INTRAVENOUS at 08:55

## 2021-06-20 ASSESSMENT — ENCOUNTER SYMPTOMS
ABDOMINAL PAIN: 0
VOMITING: 0
CHEST TIGHTNESS: 0
RHINORRHEA: 0
DIARRHEA: 0
WHEEZING: 0
COUGH: 0
NAUSEA: 0
BLOOD IN STOOL: 0
SHORTNESS OF BREATH: 0
CONSTIPATION: 0

## 2021-06-20 NOTE — PROGRESS NOTES
[facility]  Office: [address], DO, Chester Vega, DO, Aura Dotson, DO, Bryson Herron, DO, Liam rTinh MD, Carine Infante MD, Radha Montalvo MD, Nafisa Meléndez MD, Bg Loomis MD, Karen Melgar MD, Pa Prieto MD, Harriet Negrete MD, Tiakahlil Gutierrez, DO, Nurys Dillard MD, Serena Nickerson, DO, Jory Cheng MD,  Igor Salinas, DO, Romana Begin, MD, Chandu Lopez MD, Coleman Mao MD, Sara Andrew MD, Nikki Ball, [facility], [facility], CNP, [facility], CNP, Adolfo Loyd, CNS, Lane Tucker, CNP, Tyler Harper, CNP, Phuc Astudillo, CNP, Chriss Almazan, CNP, Heath Howard, CNP, Alfonzo Cui PA-C, Vickie Bagley, [facility], Amisha Doan, CNP, Alba Guzman, CNP, Lakia Wilson, CNP, Gucci Reynoso, CNP, Rafat [facility], CNP, [facility]      Daily Progress Note     Admit Date: 6/19/2021  Bed/Room No.  2009/2009-02  Admitting Physician : Radha Montalvo MD  Code Status :Full Code  Hospital Day:  LOS: 1 day   Chief Complaint:     Chief Complaint   Patient presents with    Altered Mental Status     Principal Problem:    Confusion with non-focal neuro exam  Active Problems:    Altered mental status    Essential hypertension    HLD (hyperlipidemia)    Vitamin D deficiency  Resolved Problems:    * No resolved hospital problems. *    Subjective : Interval History/Significant events :  06/20/21    Patient is back to baseline per family. She is eating and drinking ok without any difficulty. She denies any cough , fever , dyspnea, abd pain , dysuria , frequency. Patient denies any diarrhea. She has impaired recent and remote memory. Vitals - Stable afebrile  Labs - creatinine 1.01 , normal WBC , normal UA       Nursing notes , Consults notes reviewed. Overnight events and updates discussed with Nursing staff .    Background History:         Mayra Prather is 81 y.o. female  Who was admitted to the hospital on 6/19/2021 for treatment of Confusion with non-focal neuro exam.   Patient was brought to emergency room with altered mental status. She was brought in by police and on evaluation she was unable to provide history. Apparently patient had called in police that somebody had broken into her house. Upon first contact she was found to be confused. She has underlying history of dementia, hypertension. Initial evaluation showed stable vitals, was breathing on room air. Lab evaluation showed normal electrolytes, creatinine 1.02, WBC 6.0, UA was negative. CT head was negative for acute intracranial abnormality. Chest x-ray showed bilateral increased interstitial markings suspicious for right upper lobe pneumonia versus edema. PMH:  Past Medical History:   Diagnosis Date    Dementia ([facility] [address]     Hypertension       Allergies: No Known Allergies   Medications :  sodium chloride flush, 10 mL, Intravenous, 2 times per day  enoxaparin, 40 mg, Subcutaneous, Daily  atorvastatin, 20 mg, Oral, Daily  citalopram, 10 mg, Oral, Daily  Vitamin D, 500 Units, Oral, Daily  lisinopril, 40 mg, Oral, Daily        Review of Systems   Review of Systems   Constitutional: Negative for appetite change, fatigue, fever and unexpected weight change. HENT: Negative for congestion, rhinorrhea and sneezing. Eyes: Negative for visual disturbance. Respiratory: Negative for cough, chest tightness, shortness of breath and wheezing. Cardiovascular: Negative for chest pain and palpitations. Gastrointestinal: Negative for abdominal pain, blood in stool, constipation, diarrhea, nausea and vomiting. Genitourinary: Negative for dysuria, enuresis, frequency and hematuria. Musculoskeletal: Negative for arthralgias and myalgias. Skin: Negative for rash. Neurological: Negative for dizziness, weakness, light-headedness and headaches. Hematological: Does not bruise/bleed easily. Psychiatric/Behavioral: Positive for confusion.  Negative for dysphoric mood and sleep disturbance. Objective :      Current Vitals : Temp: 97.3 °F (36.3 °C),  Pulse: 76, Resp: 18, BP: (!) 174/88, SpO2: 95 %  Last 24 Hrs Vitals   Patient Vitals for the past 24 hrs:   BP Temp Temp src Pulse Resp SpO2   06/20/21 0730 (!) 174/88 97.3 °F (36.3 °C) -- 76 18 95 %   06/20/21 0343 (!) 168/94 98.4 °F (36.9 °C) Oral 94 16 96 %   06/19/21 2348 (!) 168/87 98 °F (36.7 °C) Oral 70 16 93 %   06/1936 (!) 154/92 97.7 °F (36.5 °C) -- 75 16 93 %   06/19/21 1513 (!) 161/83 98.1 °F (36.7 °C) Oral 74 16 94 %   06/19/21 1126 (!) 146/78 98.4 °F (36.9 °C) Oral 75 16 95 %     Intake / output   06/19 0701 - 06/20 0700  In: 582.5 [P.O.:240; I.V.:342.5]  Out: 300 [Urine:300]  Physical Exam:  Physical Exam  Vitals and nursing note reviewed. Constitutional:       General: She is not in acute distress. Appearance: She is not diaphoretic. HENT:      Head: Normocephalic and atraumatic. Nose:      Right Sinus: No maxillary sinus tenderness or frontal sinus tenderness. Left Sinus: No maxillary sinus tenderness or frontal sinus tenderness. Mouth/Throat:      Pharynx: No oropharyngeal exudate. Eyes:      General: No scleral icterus. Conjunctiva/sclera: Conjunctivae normal.      Pupils: Pupils are equal, round, and reactive to light. Neck:      Thyroid: No thyromegaly. Vascular: No JVD. Cardiovascular:      Rate and Rhythm: Normal rate and regular rhythm. Pulses:           Dorsalis pedis pulses are 2+ on the right side and 2+ on the left side. Heart sounds: Normal heart sounds. No murmur heard. Pulmonary:      Effort: Pulmonary effort is normal.      Breath sounds: Normal breath sounds. No wheezing or rales. Abdominal:      Palpations: Abdomen is soft. There is no mass. Tenderness: There is no abdominal tenderness. Musculoskeletal:      Cervical back: Full passive range of motion without pain and neck supple.    Lymphadenopathy:      Head:      Right side of head: No submandibular adenopathy. Left side of head: No submandibular adenopathy. Cervical: No cervical adenopathy. Skin:     General: Skin is warm. Neurological:      Mental Status: She is alert and oriented to person, place, and time. Motor: No tremor. Psychiatric:         Behavior: Behavior is cooperative. Cognition and Memory: Memory is impaired. She exhibits impaired recent memory and impaired remote memory. Laboratory findings:    Recent Labs     06/19/21  0030 06/20/21  0539   WBC 6.0 5.4   HGB 12.3 12.1   HCT 40.4 39.1    260     Recent Labs     06/19/21  0030 06/20/21  0539    139   K 4.4 4.0    104   CO2 25 23   GLUCOSE 96 90   BUN 15 17   CREATININE 1.02* 1.01*   CALCIUM 9.1 8.7     Recent Labs     06/20/21  0539   PROT 6.2*   LABALBU 3.7   AST 14   ALT 6   ALKPHOS 61   BILITOT 0.43          Specific Gravity, UA   Date Value Ref Range Status   06/19/2021 1.020 1.005 - 1.030 Final     Protein, UA   Date Value Ref Range Status   06/19/2021 NEGATIVE NEGATIVE Final     RBC, UA   Date Value Ref Range Status   04/25/2015 0 TO 2 0 - 2 /HPF Final     Bacteria, UA   Date Value Ref Range Status   04/25/2015 FEW (A) NONE Final     Comment:     Performed at [facility], [address] [place] [address] [phone]       Nitrite, Urine   Date Value Ref Range Status   06/19/2021 NEGATIVE NEGATIVE Final     WBC, UA   Date Value Ref Range Status   04/25/2015 0 TO 2 0 - 5 /HPF Final     Leukocyte Esterase, Urine   Date Value Ref Range Status   06/19/2021 NEGATIVE NEGATIVE Final       Imaging / Clinical Data :-   CT HEAD WO CONTRAST    Result Date: 6/19/2021  No acute intracranial abnormality. Mild cerebral white matter chronic microvascular ischemic disease. XR CHEST PORTABLE    Result Date: 6/19/2021  Suspected mild pulmonary interstitial edema plus or minus right upper lung alveolar edema versus pneumonia. . Otherwise, unremarkable single portable AP view of the chest.        Clinical Course : stable  Assessment and Plan  : Altered mental status likely due to delirium with underlying  dementia. Work up negative for sepsis / infection , had possible  dehydration . Essential hypertension - uncontrolled. Resume lisinopril HCTZ       Discharge home . Continue to monitor vitals , Intake / output ,  Cell count , HGB , Kidney function, oxygenation  as indicated . Plan and updates discussed with patient along with son at bed side ,  answers  explained to satisfaction. Son is primary care giver and is looking into assisted living. He is teacher by profession and has time during summer break. He is planning to be with his mother for some time at her house.    Plan discussed with Staff Sri Weinberg RN     (Please note that portions of this note were completed with a voice recognition program. Efforts were made to edit the dictations but occasionally words are mis-transcribed.)      Luisa Ellsworth MD  6/20/2021

## 2021-06-20 NOTE — PROGRESS NOTES
Physical Therapy    Facility/Department: [facility]  Initial Assessment    NAME: Nyla June  : 1935  MRN: [id number]    Date of Service: 2021    Discharge Recommendations:    Due to recent hospitalization and medical condition, pt would benefit from additional therapy at time of discharge to ensure safety. Please refer to the AM-PAC score for current functional status. Assessment   Body structures, Functions, Activity limitations: Decreased functional mobility ; Decreased balance  Assessment: Skilled therapy needed to maximize independence with functional mobility and to improve balance to ensure safe discharge home. Patient's son is very attentive and supportive and is hoping patient's confusion improves to go home with assist as prior and home health. Prognosis: Good  Decision Making: Medium Complexity  Exam: AROM, strength, balance, endurance, mobility, AM-PAC  Clinical Presentation: evolving  PT Education: Goals;PT Role;Plan of Care;Transfer Training;General Safety;Gait Training  Barriers to Learning: cognition  REQUIRES PT FOLLOW UP: Yes  Activity Tolerance  Activity Tolerance: Patient Tolerated treatment well       Patient Diagnosis(es): The encounter diagnosis was Altered mental status, unspecified altered mental status type. has a past medical history of Dementia ([address] and Hypertension. has a past surgical history that includes Tubal ligation and Appendectomy. PER HPI:  Scout Greenbergelman a 81 y. o. female who presents to the emergency department for evaluation of altered mental status.  The patient is unaccompanied to the emergency department. Keyla Downey was brought by police. [place] do not have a good history on her, there are no family members and she is unable to provide us a good history.  She had apparently called the police tonight and they checked on her. Keyla Downey thought somebody had broken into her house but they had not and she was brought here because of the  recognized that she was confused.  No history of trauma and she does not complain of fever or cough or headache.  Its not clear when the symptoms started.  No further history is obtainable. \"  Restrictions  Restrictions/Precautions  Restrictions/Precautions: General Precautions, Up as Tolerated  Required Braces or Orthoses?: No  Position Activity Restriction  Other position/activity restrictions: up as neel, RUE IV, Dementia, ALARMS  Vision/Hearing  Vision: Within Functional Limits  Hearing: Exceptions to [facility]  Hearing Exceptions: Hard of hearing/hearing concerns;Bilateral hearing aid     Subjective  General  Chart Reviewed: Yes  Patient assessed for rehabilitation services?: Yes  Family / Caregiver Present: Yes (son present)  Follows Commands: Within Functional Limits  General Comment  Comments: RN states patient  appropriate for therapy  Subjective  Subjective: patient agreeable to therapy, son present and helped with social history, he feels patient has increased confusion due to possible UTI  Pain Screening  Patient Currently in Pain: Denies          Orientation  Orientation  Overall Orientation Status: Impaired  Orientation Level: Oriented to place; Disoriented to situation;Oriented to person;Disoriented to time  Social/Functional History  Social/Functional History  Lives With: Alone  Type of Home: House  Home Layout: One level, Laundry in basement  Home Access: Stairs to enter with rails  Entrance Stairs - Number of Steps: 3  Bathroom Shower/Tub: Tub/Shower unit  Bathroom Toilet: Standard  Bathroom Equipment: Shower chair, Hand-held shower, Grab bars around toilet  Home Equipment: 4 wheeled walker, [place] Help From: Family  ADL Assistance: [address]: Needs assistance (recieves meals on wheels, does her own laundry and cleaning)  Homemaking Responsibilities: Yes  Ambulation Assistance: Independent (Pt only uses 4ww walekder when walking around the block)  Transfer Assistance: Independent  Active : No  Occupation: Retired  Type of occupation:   Leisure & Hobbies: cat, out to eat with friends  Additional Comments: Pt denies any recent falls  Cognition   Cognition  Overall Cognitive Status: Exceptions  Arousal/Alertness: Appropriate responses to stimuli  Following Commands:  Follows all commands without difficulty  Attention Span: Appears intact  Memory: Decreased short term memory;Decreased long term memory  Safety Judgement: Decreased awareness of need for assistance;Decreased awareness of need for safety  Problem Solving: Assistance required to generate solutions  Insights: Decreased awareness of deficits  Initiation: Requires cues for some    Objective     Observation/Palpation  Posture: Good  Observation: resting comfortably in bed    AROM RLE (degrees)  RLE AROM: WFL  AROM LLE (degrees)  LLE AROM : WFL  AROM RUE (degrees)  RUE General AROM: refer to OT  AROM LUE (degrees)  LUE General AROM: refer to OT  Strength RLE  Strength RLE: WFL  Strength LLE  Strength LLE: WFL  Strength RUE  Comment: refer to OT  Strength LUE  Comment: refer to OT  Motor Control  Gross Motor?: WFL  Coordination  Heel to Shin: Normal  Sensation  Overall Sensation Status: WFL  Bed mobility  Supine to Sit: Stand by assistance  Sit to Supine: Stand by assistance  Transfers  Sit to Stand: Stand by assistance  Stand to sit: Stand by assistance  Comment: patient moves fast  Ambulation  Ambulation?: Yes  More Ambulation?: Yes  Ambulation 1  Surface: level tile  Device: Rolling Walker  Assistance: Contact guard assistance  Distance: 40'  Comments: slightly unsteady on turns, tends to pick walker up instead of pushing walker  Ambulation 2  Surface - 2: level tile  Device 2: No device  Assistance 2: Contact guard assistance  Distance: 40'  Comments: slightly unsteady due to attempting heel toe walking (without being asked), gait improved when this stopped     Balance  Posture: Good  Sitting - Static: Good  Sitting - Dynamic: Good  Standing - Static: Good  Standing - Dynamic: Fair;+  Comments: patient able to bend down to floor and  shoes,        Plan   Plan  Times per week: 1-2x/day for 5-6 days/week  Current Treatment Recommendations: Transfer Training, Patient/Caregiver Education & Training, Balance Training, Gait Training, Safety Education & Training  Safety Devices  Type of devices: Bed alarm in place, Left in bed, Gait belt, Nurse notified      AM-PAC Score  AM-PAC Inpatient Mobility Raw Score : 20 (06/20/21 0926)  AM-PAC Inpatient T-Scale Score : 47.67 (06/20/21 0926)  Mobility Inpatient CMS 0-100% Score: 35.83 (06/20/21 0926)  Mobility Inpatient CMS G-Code Modifier : Lanette De Leon (06/20/21 6346)          Goals  Short term goals  Time Frame for Short term goals: 8 visits  Short term goal 1: Independent bed mobility  Short term goal 2:  Independent sit<>stand  Short term goal 3: Patient to ambulate 150' without device independent  Short term goal 4: Patient to tolerate 25 minutes ther act to facilitate improving dynamic standing balance to good  Patient Goals   Patient goals : to go home       Therapy Time   Individual Concurrent Group Co-treatment   Time In 0730         Time Out 0750 (additional 10 minutes for chart review)         Minutes 20+10=30          Treatment Time: 15 minutes       Ples Dakin, PT

## 2021-06-20 NOTE — DISCHARGE SUMMARY
[facility]  Office: [phone]   Sever Blood DO, Robe Richard MD, Karmen Mcguire MD, Edith Cole MD, Bboby Negro MD, John Matos MD, Mario Tucker MD, Annette Amado MD, Anushka Onofre MD, Violette Hyde MD, Mildred [place] DO, Aysha Machado MD, Dai Aburto MD, Chantale Boykin DO, Thomas Salazar MD,  Radha Thomas MD, Sergei Trejo MD, Andre Rodriguez CNP, [facility] CNP, Patel Macadam CNP, Juliann Octave CNS, Vida Guise CNP, Viva [place] CNP, Christiana Door CNP, Margarita Gela CNP, Stacey Dryjaden CNP, Joseph Claudio PA-C, Bj Villafana CNP, Emile Bacon CNP, Miles Dunne CNP, Gricelda Weiner CNP, Tonie Cortez CNP, [facility], [facility]      Discharge Summary     Patient ID: Rosi Severe  :  1935   MRN: [id number]     ACCOUNT:  [de-identified]   Patient Location :   Patient's PCP: Adan Nash MD  Admit Date: 2021   Discharge Date: 2021     Length of Stay: 1  Code Status:  Prior  Admitting Physician: Edith Cole MD  Discharge Physician: Edith Cole MD     Active Discharge Diagnosis :     Primary Problem  Confusion with non-focal neuro exam      [facility] Problems    Diagnosis Date Noted    Altered mental status [R41.82] 2021    Confusion with non-focal neuro exam [R41.0] 2021    Essential hypertension [I10] 2021    HLD (hyperlipidemia) [E78.5] 2021    Vitamin D deficiency [E55.9] 2021       Admission Condition:  fair     Discharged Condition: stable    Hospital Stay:     Hospital Course: Rosi Severe is a 81 y.o. female who was admitted for the management of   Confusion with non-focal neuro exam , presented to ER with Altered Mental Status    Patient was brought to emergency room with altered mental status.   She was brought in by police and on evaluation she was unable to provide history. Apparently patient had called in police that somebody had broken into her house. Upon first contact she was found to be confused. She has underlying history of dementia, hypertension. Initial evaluation showed stable vitals, was breathing on room air. Lab evaluation showed normal electrolytes, creatinine 1.02, WBC 6.0, UA was negative. CT head was negative for acute intracranial abnormality. Chest x-ray showed bilateral increased interstitial markings suspicious for right upper lobe pneumonia versus edema. Significant therapeutic interventions: Altered mental status likely due to delirium with underlying  dementia. Work up negative for sepsis /UTI/pneumonia. Possibly had mild dehydration. Which improved with IV fluid administration. Patient back to baseline. Discharge home with family. Essential hypertension - uncontrolled. Resume lisinopril HCTZ     Significant Diagnostic Studies:   Labs / Micro:/Radiology  Recent Labs     06/20/21  0539 06/19/21  0030   WBC 5.4 6.0   HGB 12.1 12.3   HCT 39.1 40.4   MCV 99.0 101.3    287     Labs Renal Latest Ref Rng & Units 6/20/2021 6/19/2021 4/25/2015   BUN 8 - 23 mg/dL 17 15 15   Cr 0.50 - 0.90 mg/dL 1.01(H) 1.02(H) 0.91(H)   K 3.7 - 5.3 mmol/L 4.0 4.4 4.4   Na 135 - 144 mmol/L 139 140 140     Lab Results   Component Value Date    ALT 6 06/20/2021    AST 14 06/20/2021    ALKPHOS 61 06/20/2021    BILITOT 0.43 06/20/2021     No results found for: TSHFT4, TSH  No results found for: HAV, HEPAIGM, HEPBIGM, HEPBCAB, HBEAG, HEPCAB  Lab Results   Component Value Date    COLORU YELLOW 06/19/2021    NITRU NEGATIVE 06/19/2021    GLUCOSEU NEGATIVE 06/19/2021    KETUA NEGATIVE 06/19/2021    UROBILINOGEN Normal 06/19/2021    BILIRUBINUR NEGATIVE 06/19/2021     No results found for: LABA1C  No results found for: EAG  No results found for: INR, PROTIME    CT HEAD WO CONTRAST    Result Date: 6/19/2021  No acute intracranial abnormality.  Mild cerebral white matter chronic microvascular ischemic disease. XR CHEST PORTABLE    Result Date: 6/19/2021  Suspected mild pulmonary interstitial edema plus or minus right upper lung alveolar edema versus pneumonia. . Otherwise, unremarkable single portable AP view of the chest.           Consultations:    Consults:     Final Specialist Recommendations/Findings:   IP CONSULT TO HOSPITALIST  IP CONSULT TO SOCIAL WORK      The patient was seen and examined on day of discharge and this discharge summary is in conjunction with any daily progress note from day of discharge. Discharge plan:     Disposition: Home    Physician Follow Up:     Rossana Hernandez MD  [address] [address]  [phone]    Schedule an appointment as soon as possible for a visit in 1 week  follow-up       Requiring Further Evaluation/Follow Up POST HOSPITALIZATION/Incidental Findings: Follow-up with PCP. Hydrochlorothiazide can be discontinued if continues to have dehydration or low oral intake, if blood pressure remains stable. Diet: regular diet    Activity: As tolerated. Instructions to Patient: needs supervision. Son is planning to live with patient for now and looking into options of assisted living. Discharge Medications:      Medication List      CONTINUE taking these medications    citalopram 10 MG tablet  Commonly known as: CELEXA     cloNIDine 0.1 MG tablet  Commonly known as: CATAPRES     donepezil 5 MG tablet  Commonly known as: ARICEPT            Time Spent on discharge is  25 mins in patient examination, evaluation, counseling as well as medication reconciliation, prescriptions for required medications, discharge plan and follow up. Electronically signed by   Radha Montalvo MD  6/20/2021        Thank you Dr. Rossana Hernandez MD for the opportunity to be involved in this patient's care.

## 2021-06-20 NOTE — PROGRESS NOTES
Occupational Therapy   Occupational Therapy Initial Assessment  Date: 2021   Patient Name: Sunshine Pfeiffer  MRN: [id number]     : 1935    RN Yan Falcon reports patient is medically stable for therapy treatment this date. Chart reviewed prior to treatment and patient is agreeable for therapy. All lines intact and patient positioned comfortably at end of treatment. All patient needs addressed prior to ending therapy session. Date of Service: 2021    Discharge Recommendations:  Patient would benefit from continued therapy after discharge   Due to recent hospitalization and medical condition, pt would benefit from additional therapy at time of discharge to ensure safety. Please refer to the AM-PAC score for current functional status. OT Equipment Recommendations  Equipment Needed: Yes  Mobility Devices: ADL Assistive Devices  ADL Assistive Devices: Emergency Alert System    Assessment   Performance deficits / Impairments: Decreased functional mobility ; Decreased safe awareness;Decreased cognition;Decreased ADL status; Decreased strength;Decreased high-level IADLs;Decreased fine motor control;Decreased balance  Assessment: Skilled OT services are indicated to increase I and safety during functional tasks to return home at prior level of function as able. Prognosis: Good;Fair  Decision Making: Medium Complexity  OT Education: OT Role;Transfer Training;Energy Conservation;Plan of Care;ADL Adaptive Strategies; Family Education  Patient Education: safety in function, fall prevention/call light use, recommendations for continued therapy, benefits of being OOB, reorientation  Barriers to Learning: cognition  REQUIRES OT FOLLOW UP: Yes  Activity Tolerance  Activity Tolerance: Patient Tolerated treatment well;Treatment limited secondary to decreased cognition  Activity Tolerance: fair  Safety Devices  Safety Devices in place: Yes  Type of devices: Nurse notified;Gait belt;Bed alarm in place;Call light within reach; Patient at risk for falls; Left in bed; All fall risk precautions in place (Pt son in room at end of session)           Patient Diagnosis(es): The encounter diagnosis was Altered mental status, unspecified altered mental status type. has a past medical history of Dementia ([address] and Hypertension. has a past surgical history that includes Tubal ligation and Appendectomy. PER H&P: Brett Santiago is a 81 y.o. female who presents to the emergency department for evaluation of altered mental status. The patient is unaccompanied to the emergency department. She was brought by police. We do not have a good history on her, there are no family members and she is unable to provide us a good history. She had apparently called the police tonight and they checked on her. She thought somebody had broken into her house but they had not and she was brought here because of the  recognized that she was confused. No history of trauma and she does not complain of fever or cough or headache. Its not clear when the symptoms started.   No further history is obtainable.          Restrictions  Restrictions/Precautions  Restrictions/Precautions: General Precautions, Up as Tolerated  Required Braces or Orthoses?: No  Position Activity Restriction  Other position/activity restrictions: up as neel, RUE IV, Dementia, ALARMS    Subjective   General  Chart Reviewed: Yes  Patient assessed for rehabilitation services?: Yes  Family / Caregiver Present: Yes (Son in room)  Subjective  Subjective: Pt resting in bed comfortably, confused but agreeable to OT eval    Social/Functional History  Social/Functional History  Lives With: Alone  Type of Home: House  Home Layout: One level, Laundry in basement  Home Access: Stairs to enter with rails  Entrance Stairs - Number of Steps: 3  Bathroom Shower/Tub: Tub/Shower unit  Bathroom Toilet: Standard  Bathroom Equipment: Shower chair, Hand-held shower, Grab bars around toilet  Home Equipment: 4 wheeled walker, [employer] Help From: Family  ADL Assistance: Independent  Homemaking Assistance: Needs assistance (recieves meals on wheels, does her own laundry and cleaning)  Homemaking Responsibilities: Yes  Ambulation Assistance: Independent (Pt only uses 4ww walekder when walking around the block)  Transfer Assistance: Independent  Active : No  Occupation: Retired  Type of occupation:   Leisure & Hobbies: cat, out to eat with friends  Additional Comments: Pt denies any recent falls       Objective   Vision: Within Functional Limits  [facility]  Hearing Exceptions: Hard of hearing/hearing concerns;Bilateral hearing aid    Orientation  Overall Orientation Status: Impaired  Orientation Level: Oriented to place; Disoriented to time;Disoriented to situation;Oriented to person  Observation/Palpation  Posture: Good  Observation: resting comfortably in bed  Edema: none       Balance  Sitting Balance: Stand by assistance  Standing Balance: Contact guard assistance (with/without RW)  Standing Balance  Time: standing neel ~ 2-3 min  Activity: functional mobility  Functional Mobility  Functional - Mobility Device: Rolling Walker  Activity:  (bed <> door (with RW), bed <> door (without RW))  Assist Level: Contact guard assistance  Functional Mobility Comments: Pt required Min verbal cues for slowing down, pacing self, general safety awareness and scanning environment all to redue risk of fall and increase safety.   Toilet Transfers  Toilet Transfer: Unable to assess  Toilet Transfers Comments: Pt with no needs at this time       ADL  Feeding: Setup  Grooming: Setup;Stand by assistance (seated)  UE Bathing: Setup;Stand by assistance  LE Bathing: Setup;Stand by assistance  UE Dressing: Setup;Minimal assistance (with hosp gown)  LE Dressing: Setup;Stand by assistance (to doff/don hosp socks sitting EOB)  Toileting: Contact guard assistance       Tone RUE  RUE Tone: Normotonic  Tone LUE  LUE Tone: Normotonic  Coordination  Movements Are Fluid And Coordinated: No  Coordination and Movement description: Fine motor impairments; Left UE;Decreased speed;Decreased accuracy; Right UE        Bed mobility  Supine to Sit: Stand by assistance  Sit to Supine: Stand by assistance  Scooting: Stand by assistance  Comment: Pt with good use of bedrails. Transfers  Stand Step Transfers: Stand by assistance  Sit to stand: Stand by assistance  Stand to sit: Stand by assistance  Transfer Comments: Pt required Min verbal cues/tactile assist for slowing down movements, RW safety, pacing self, reaching back to surface prior to sitting all to increase safety. Cognition  Overall Cognitive Status: Exceptions  Arousal/Alertness: Appropriate responses to stimuli  Following Commands:  Follows all commands without difficulty  Attention Span: Appears intact  Memory: Decreased short term memory;Decreased long term memory  Safety Judgement: Decreased awareness of need for assistance;Decreased awareness of need for safety  Problem Solving: Assistance required to generate solutions  Insights: Decreased awareness of deficits  Initiation: Requires cues for some        Sensation  Overall Sensation Status: WFL        LUE AROM (degrees)  LUE AROM : WFL  RUE AROM (degrees)  RUE AROM : WFL  LUE Strength  Gross LUE Strength: WFL  LUE Strength Comment: 4-/5  RUE Strength  Gross RUE Strength: WFL  RUE Strength Comment: 4-/5             Plan   Plan  Times per week: 4-5x/wk 1x/day as neel  Current Treatment Recommendations: Strengthening, Endurance Training, Balance Training, Functional Mobility Training, Safety Education & Training, Self-Care / ADL, Home Management Training, Patient/Caregiver Education & Training, Equipment Evaluation, Education, & procurement             AM-PAC Score        AM-[facility] Inpatient Daily Activity Raw Score: 19 (06/20/21 0939)  AM-PAC Inpatient ADL T-Scale Score : 40.22 (06/20/21

## 2021-06-20 NOTE — PLAN OF CARE
Problem: Falls - Risk of:  Goal: Will remain free from falls  Description: Will remain free from falls  6/20/2021 0220 by Liseth Morales RN  Outcome: Ongoing  Note: Patient is a fall risk during this admission. Fall risk assessment was performed. Patient is absent of falls. Bed is in the lowest position. Wheels on the bed are locked. Call light and bed side table are within reach. Clutter is removed. Patient was educated to call out when needing assistance or wanting to get out of bed. Patient offered toileting assistance during rounding. Hourly rounds have been performed. Problem: Sensory:  Goal: General experience of comfort will improve  Description: General experience of comfort will improve  6/20/2021 0220 by Liseth Morales RN  Outcome: Ongoing  Note: Acknowledge feelings of discomfort; observe for nonverbal cues of discomfort, such as restlessness, muscle tension, or altered vital signs. Assess the degree of discomfort (characteristics, severity, location, onset, type, precipitating factors, and duration). Identify factors that relieve or worsen the patient's discomfort. Identify an acceptable level of discomfort. Collaborate with the patient and family (as appropriate) in developing a plan to reduce discomfort, as appropriate. Encourage the family to spend time with the patient and assist in providing comfort measures. Educate the patient and family (as appropriate) about addressing the discomfort sooner rather than later, making it easier to control. Offer alternative therapies to increase comfort, such as massage therapy or relaxation therapy, as appropriate. Provide nonpharmacologic comfort measures. Adjust the environment, as necessary, and position the patient for comfort. Administer prescribed medications, and monitor for effect.      Problem: Urinary Elimination:  Goal: Signs and symptoms of infection will decrease  Description: Signs and symptoms of infection will decrease  Outcome: Ongoing  Note: Assess the patients bladder fullness, as ordered. Encourage the patient to consume adequate fluids of choice, unless contraindicated. Encourage the patient to verbalize feelings and concerns regarding urine retention. Evaluate the cause of the patients urine retention. If the patient has an indwelling catheter, ensure proper functioning and flow, keeping the urinary drainage bag below the level of the bladder. Assess the patient for abdominal discomfort. Monitor and measure the patients intake and output, as ordered. Monitor the patient for signs and symptoms of urinary tract infection. Educate the patient and family (as appropriate) about methods to improve bladder emptying. Administer prescribed medication; monitor their effect. Assist with urodynamic testing, as directed; monitor the patients response. Catheterize the patient, as ordered; monitor the patients response.

## 2021-06-21 LAB
ESTIMATED AVERAGE GLUCOSE: 105 MG/DL
HBA1C MFR BLD: 5.3 % (ref 4–6)

## 2021-08-20 ENCOUNTER — HOSPITAL ENCOUNTER (EMERGENCY)
Age: 86
Discharge: HOME | End: 2021-08-20
Attending: EMERGENCY MEDICINE
Payer: MEDICARE

## 2021-08-20 VITALS
TEMPERATURE: 98.1 F | OXYGEN SATURATION: 90 % | HEART RATE: 68 BPM | RESPIRATION RATE: 12 BRPM | WEIGHT: 114 LBS | DIASTOLIC BLOOD PRESSURE: 79 MMHG | SYSTOLIC BLOOD PRESSURE: 162 MMHG | HEIGHT: 68 IN | BODY MASS INDEX: 17.28 KG/M2

## 2021-08-20 DIAGNOSIS — R42 DIZZINESS: Primary | ICD-10-CM

## 2021-08-20 PROCEDURE — 99283 EMERGENCY DEPT VISIT LOW MDM: CPT

## 2021-08-20 PROCEDURE — 6370000000 HC RX 637 (ALT 250 FOR IP): Performed by: EMERGENCY MEDICINE

## 2021-08-20 RX ORDER — MECLIZINE HYDROCHLORIDE 25 MG/1
25 TABLET ORAL 3 TIMES DAILY PRN
Qty: 15 TABLET | Refills: 0 | Status: SHIPPED | OUTPATIENT
Start: 2021-08-20 | End: 2021-08-30

## 2021-08-20 RX ORDER — MECLIZINE HCL 12.5 MG/1
25 TABLET ORAL ONCE
Status: COMPLETED | OUTPATIENT
Start: 2021-08-20 | End: 2021-08-20

## 2021-08-20 RX ADMIN — MECLIZINE 25 MG: 12.5 TABLET ORAL at 11:55

## 2021-08-20 ASSESSMENT — ENCOUNTER SYMPTOMS
NAUSEA: 0
VOMITING: 0
EYE REDNESS: 0
COLOR CHANGE: 0
SHORTNESS OF BREATH: 0
EYE DISCHARGE: 0
SORE THROAT: 0
RHINORRHEA: 0
DIARRHEA: 0
COUGH: 0

## 2021-08-20 NOTE — ED NOTES
Son at bedside verbalizing she has been having issues with living alone and looking to have her placed in a assisted living.       Evelia Wang RN  08/20/21 6955

## 2021-08-20 NOTE — ED NOTES
Bed: 22  Expected date:   Expected time:   Means of arrival:   Comments:  [id number] Joe Goff RN  08/20/21 1111

## 2021-08-20 NOTE — ED PROVIDER NOTES
EMERGENCY DEPARTMENT ENCOUNTER    Pt Name: Natalie Walsh  MRN: [id number]  [place] 1935  Date of evaluation: 8/20/21  CHIEF COMPLAINT       Chief Complaint   Patient presents with    Dizziness     HISTORY OF PRESENT ILLNESS   This is an 52-year-old female that presents with complaints of dizziness. The patient is a poor historian, she has a history of dementia and is accompanied by her son. Her son states that she has been having issues with this type of dizziness for the past few months, her family doctor has equated it to blood pressure medication issues and has been changing her blood pressure medications intermittently to try and improve things. I took her off of another blood pressure medication just a few weeks ago. The son states that he really did not want EMS to transport the patient to the hospital but he felt somewhat pressured. REVIEW OF SYSTEMS     Review of Systems   Constitutional: Negative for chills and fever. HENT: Negative for rhinorrhea and sore throat. Eyes: Negative for discharge, redness and visual disturbance. Respiratory: Negative for cough and shortness of breath. Cardiovascular: Negative for chest pain, palpitations and leg swelling. Gastrointestinal: Negative for diarrhea, nausea and vomiting. Musculoskeletal: Negative for arthralgias, myalgias and neck pain. Skin: Negative for color change and rash. Neurological: Positive for dizziness. Negative for seizures, weakness and headaches. Psychiatric/Behavioral: Negative for hallucinations, self-injury and suicidal ideas.      PASTMEDICAL HISTORY     Past Medical History:   Diagnosis Date    Dementia ([facility] [address]     Hypertension      Past Problem List  Patient Active Problem List   Diagnosis Code    Altered mental status R41.82    Confusion with non-focal neuro exam R41.0    Essential hypertension I10    HLD (hyperlipidemia) E78.5    Vitamin D deficiency E55.9     SURGICAL HISTORY       Past motion and neck supple. Skin:     General: Skin is warm. Findings: No rash. Neurological:      Mental Status: She is alert and oriented to person, place, and time. GCS: GCS eye subscore is 4. GCS verbal subscore is 5. GCS motor subscore is 6. Psychiatric:         Speech: Speech normal.         MEDICAL DECISION MAKIN-year-old female presents with dizziness, had a discussion with the son regarding her dizziness, this is an ongoing issue, at this time he does not believe that she requires a significant work-up. We will try a single dose of meclizine and reevaluate. CRITICAL CARE:       PROCEDURES:    Procedures    DIAGNOSTIC RESULTS   EKG:All EKG's are interpreted by the Emergency Department Physician who either signs or Co-signs this chart in the absence of a cardiologist.        RADIOLOGY:All plain film, CT, MRI, and formal ultrasound images (except ED bedside ultrasound) are read by the radiologist, see reports below, unless otherwisenoted in MDM or here. No orders to display     LABS: All lab results were reviewed by myself, and all abnormals are listed below. Labs Reviewed - No data to display    EMERGENCY DEPARTMENTCOURSE:         Vitals:    Vitals:    21 1116   BP: (!) 162/82   Pulse: 66   Temp: 98.1 °F (36.7 °C)   TempSrc: Oral   SpO2: 94%   Weight: 114 lb (51.7 kg)   Height: 5' 7.5\" (1.715 m)       The patient was given the following medications while in the emergency department:  Orders Placed This Encounter   Medications    meclizine (ANTIVERT) tablet 25 mg    meclizine (ANTIVERT) 25 MG tablet     Sig: Take 1 tablet by mouth 3 times daily as needed for Dizziness     Dispense:  15 tablet     Refill:  0     CONSULTS:  None    FINAL IMPRESSION      1.  Dizziness          DISPOSITION/PLAN   DISPOSITION Decision To Discharge 2021 12:18:16 PM      PATIENT REFERRED TO:  [address]  [phone]    Schedule an appointment as soon as possible for a visit in 2 days      DISCHARGE MEDICATIONS:  New Prescriptions    MECLIZINE (ANTIVERT) 25 MG TABLET    Take 1 tablet by mouth 3 times daily as needed for Dizziness     Nohemi Rabago MD  Attending Emergency Physician                   Nohemi Rabago MD  08/20/21 3237

## 2021-11-19 ENCOUNTER — APPOINTMENT (OUTPATIENT)
Dept: CT IMAGING | Age: 86
DRG: 522 | End: 2021-11-19
Payer: MEDICARE

## 2021-11-19 ENCOUNTER — APPOINTMENT (OUTPATIENT)
Dept: GENERAL RADIOLOGY | Age: 86
DRG: 522 | End: 2021-11-19
Payer: MEDICARE

## 2021-11-19 ENCOUNTER — HOSPITAL ENCOUNTER (INPATIENT)
Age: 86
LOS: 9 days | Discharge: NURSING, REHAB OR LONG-TERM CARE | DRG: 522 | End: 2021-11-28
Attending: EMERGENCY MEDICINE | Admitting: FAMILY MEDICINE
Payer: MEDICARE

## 2021-11-19 DIAGNOSIS — S72.002A CLOSED FRACTURE OF NECK OF LEFT FEMUR, INITIAL ENCOUNTER (HCC): Primary | ICD-10-CM

## 2021-11-19 DIAGNOSIS — S72.002A LEFT DISPLACED FEMORAL NECK FRACTURE (HCC): ICD-10-CM

## 2021-11-19 LAB
ABO/RH: NORMAL
ABSOLUTE EOS #: 0.19 K/UL (ref 0–0.44)
ABSOLUTE IMMATURE GRANULOCYTE: 0.07 K/UL (ref 0–0.3)
ABSOLUTE LYMPH #: 1 K/UL (ref 1.1–3.7)
ABSOLUTE MONO #: 0.91 K/UL (ref 0.1–1.2)
ALBUMIN SERPL-MCNC: 3.8 G/DL (ref 3.5–5.2)
ALBUMIN/GLOBULIN RATIO: ABNORMAL (ref 1–2.5)
ALP BLD-CCNC: 64 U/L (ref 35–104)
ALT SERPL-CCNC: 7 U/L (ref 5–33)
ANION GAP SERPL CALCULATED.3IONS-SCNC: 12 MMOL/L (ref 9–17)
ANION GAP SERPL CALCULATED.3IONS-SCNC: 13 MMOL/L (ref 9–17)
ANTIBODY SCREEN: NEGATIVE
ARM BAND NUMBER: NORMAL
AST SERPL-CCNC: 16 U/L
BASOPHILS # BLD: 1 % (ref 0–2)
BASOPHILS ABSOLUTE: 0.06 K/UL (ref 0–0.2)
BILIRUB SERPL-MCNC: 0.28 MG/DL (ref 0.3–1.2)
BUN BLDV-MCNC: 13 MG/DL (ref 8–23)
BUN BLDV-MCNC: 15 MG/DL (ref 8–23)
BUN/CREAT BLD: 18 (ref 9–20)
BUN/CREAT BLD: 19 (ref 9–20)
CALCIUM SERPL-MCNC: 8.3 MG/DL (ref 8.6–10.4)
CALCIUM SERPL-MCNC: 8.6 MG/DL (ref 8.6–10.4)
CHLORIDE BLD-SCNC: 101 MMOL/L (ref 98–107)
CHLORIDE BLD-SCNC: 103 MMOL/L (ref 98–107)
CO2: 24 MMOL/L (ref 20–31)
CO2: 25 MMOL/L (ref 20–31)
CREAT SERPL-MCNC: 0.71 MG/DL (ref 0.5–0.9)
CREAT SERPL-MCNC: 0.79 MG/DL (ref 0.5–0.9)
DIFFERENTIAL TYPE: ABNORMAL
EKG ATRIAL RATE: 83 BPM
EKG P AXIS: 71 DEGREES
EKG P-R INTERVAL: 220 MS
EKG Q-T INTERVAL: 392 MS
EKG QRS DURATION: 76 MS
EKG QTC CALCULATION (BAZETT): 460 MS
EKG R AXIS: 66 DEGREES
EKG T AXIS: 71 DEGREES
EKG VENTRICULAR RATE: 83 BPM
EOSINOPHILS RELATIVE PERCENT: 2 % (ref 1–4)
EXPIRATION DATE: NORMAL
GFR AFRICAN AMERICAN: >60 ML/MIN
GFR AFRICAN AMERICAN: >60 ML/MIN
GFR NON-AFRICAN AMERICAN: >60 ML/MIN
GFR NON-AFRICAN AMERICAN: >60 ML/MIN
GFR SERPL CREATININE-BSD FRML MDRD: ABNORMAL ML/MIN/{1.73_M2}
GLUCOSE BLD-MCNC: 113 MG/DL (ref 70–99)
GLUCOSE BLD-MCNC: 117 MG/DL (ref 70–99)
HCT VFR BLD CALC: 34.9 % (ref 36.3–47.1)
HEMOGLOBIN: 10.8 G/DL (ref 11.9–15.1)
IMMATURE GRANULOCYTES: 1 %
LYMPHOCYTES # BLD: 9 % (ref 24–43)
MCH RBC QN AUTO: 31.8 PG (ref 25.2–33.5)
MCHC RBC AUTO-ENTMCNC: 30.9 G/DL (ref 28.4–34.8)
MCV RBC AUTO: 102.6 FL (ref 82.6–102.9)
MONOCYTES # BLD: 9 % (ref 3–12)
NRBC AUTOMATED: 0 PER 100 WBC
PDW BLD-RTO: 14.4 % (ref 11.8–14.4)
PLATELET # BLD: 223 K/UL (ref 138–453)
PLATELET ESTIMATE: ABNORMAL
PMV BLD AUTO: 9.4 FL (ref 8.1–13.5)
POTASSIUM SERPL-SCNC: 4.1 MMOL/L (ref 3.7–5.3)
POTASSIUM SERPL-SCNC: 4.2 MMOL/L (ref 3.7–5.3)
RBC # BLD: 3.4 M/UL (ref 3.95–5.11)
RBC # BLD: ABNORMAL 10*6/UL
SARS-COV-2, RAPID: NOT DETECTED
SEG NEUTROPHILS: 78 % (ref 36–65)
SEGMENTED NEUTROPHILS ABSOLUTE COUNT: 8.46 K/UL (ref 1.5–8.1)
SODIUM BLD-SCNC: 138 MMOL/L (ref 135–144)
SODIUM BLD-SCNC: 140 MMOL/L (ref 135–144)
SPECIMEN DESCRIPTION: NORMAL
TOTAL PROTEIN: 6.2 G/DL (ref 6.4–8.3)
WBC # BLD: 10.7 K/UL (ref 3.5–11.3)
WBC # BLD: ABNORMAL 10*3/UL

## 2021-11-19 PROCEDURE — 72125 CT NECK SPINE W/O DYE: CPT

## 2021-11-19 PROCEDURE — 85025 COMPLETE CBC W/AUTO DIFF WBC: CPT

## 2021-11-19 PROCEDURE — 6360000002 HC RX W HCPCS: Performed by: EMERGENCY MEDICINE

## 2021-11-19 PROCEDURE — 73502 X-RAY EXAM HIP UNI 2-3 VIEWS: CPT

## 2021-11-19 PROCEDURE — 73552 X-RAY EXAM OF FEMUR 2/>: CPT

## 2021-11-19 PROCEDURE — 80048 BASIC METABOLIC PNL TOTAL CA: CPT

## 2021-11-19 PROCEDURE — 93005 ELECTROCARDIOGRAM TRACING: CPT | Performed by: NURSE PRACTITIONER

## 2021-11-19 PROCEDURE — 2580000003 HC RX 258: Performed by: EMERGENCY MEDICINE

## 2021-11-19 PROCEDURE — 99222 1ST HOSP IP/OBS MODERATE 55: CPT | Performed by: ORTHOPAEDIC SURGERY

## 2021-11-19 PROCEDURE — 86901 BLOOD TYPING SEROLOGIC RH(D): CPT

## 2021-11-19 PROCEDURE — 6370000000 HC RX 637 (ALT 250 FOR IP): Performed by: HOSPITALIST

## 2021-11-19 PROCEDURE — 73700 CT LOWER EXTREMITY W/O DYE: CPT

## 2021-11-19 PROCEDURE — 86900 BLOOD TYPING SEROLOGIC ABO: CPT

## 2021-11-19 PROCEDURE — 6370000000 HC RX 637 (ALT 250 FOR IP): Performed by: NURSE PRACTITIONER

## 2021-11-19 PROCEDURE — 80053 COMPREHEN METABOLIC PANEL: CPT

## 2021-11-19 PROCEDURE — 6360000002 HC RX W HCPCS

## 2021-11-19 PROCEDURE — 36415 COLL VENOUS BLD VENIPUNCTURE: CPT

## 2021-11-19 PROCEDURE — 96375 TX/PRO/DX INJ NEW DRUG ADDON: CPT

## 2021-11-19 PROCEDURE — 96374 THER/PROPH/DIAG INJ IV PUSH: CPT

## 2021-11-19 PROCEDURE — 2580000003 HC RX 258: Performed by: NURSE PRACTITIONER

## 2021-11-19 PROCEDURE — 6360000002 HC RX W HCPCS: Performed by: NURSE PRACTITIONER

## 2021-11-19 PROCEDURE — 99285 EMERGENCY DEPT VISIT HI MDM: CPT

## 2021-11-19 PROCEDURE — 70450 CT HEAD/BRAIN W/O DYE: CPT

## 2021-11-19 PROCEDURE — 87635 SARS-COV-2 COVID-19 AMP PRB: CPT

## 2021-11-19 PROCEDURE — 1200000000 HC SEMI PRIVATE

## 2021-11-19 PROCEDURE — 86850 RBC ANTIBODY SCREEN: CPT

## 2021-11-19 RX ORDER — ONDANSETRON 4 MG/1
4 TABLET, ORALLY DISINTEGRATING ORAL EVERY 8 HOURS PRN
Status: DISCONTINUED | OUTPATIENT
Start: 2021-11-19 | End: 2021-11-19 | Stop reason: SDUPTHER

## 2021-11-19 RX ORDER — ACETAMINOPHEN 325 MG/1
650 TABLET ORAL EVERY 6 HOURS PRN
Status: DISCONTINUED | OUTPATIENT
Start: 2021-11-19 | End: 2021-11-28 | Stop reason: HOSPADM

## 2021-11-19 RX ORDER — ACETAMINOPHEN 500 MG
1000 TABLET ORAL EVERY 8 HOURS
Status: ON HOLD | COMMUNITY
Start: 2021-08-01 | End: 2021-11-23

## 2021-11-19 RX ORDER — 0.9 % SODIUM CHLORIDE 0.9 %
500 INTRAVENOUS SOLUTION INTRAVENOUS ONCE
Status: COMPLETED | OUTPATIENT
Start: 2021-11-19 | End: 2021-11-19

## 2021-11-19 RX ORDER — FENTANYL CITRATE 50 UG/ML
25 INJECTION, SOLUTION INTRAMUSCULAR; INTRAVENOUS ONCE
Status: COMPLETED | OUTPATIENT
Start: 2021-11-19 | End: 2021-11-19

## 2021-11-19 RX ORDER — FENTANYL CITRATE 50 UG/ML
INJECTION, SOLUTION INTRAMUSCULAR; INTRAVENOUS
Status: COMPLETED
Start: 2021-11-19 | End: 2021-11-19

## 2021-11-19 RX ORDER — POTASSIUM CHLORIDE 20 MEQ/1
40 TABLET, EXTENDED RELEASE ORAL PRN
Status: DISCONTINUED | OUTPATIENT
Start: 2021-11-19 | End: 2021-11-28 | Stop reason: HOSPADM

## 2021-11-19 RX ORDER — SODIUM CHLORIDE 0.9 % (FLUSH) 0.9 %
10 SYRINGE (ML) INJECTION PRN
Status: DISCONTINUED | OUTPATIENT
Start: 2021-11-19 | End: 2021-11-28 | Stop reason: HOSPADM

## 2021-11-19 RX ORDER — SODIUM CHLORIDE 9 MG/ML
25 INJECTION, SOLUTION INTRAVENOUS PRN
Status: DISCONTINUED | OUTPATIENT
Start: 2021-11-19 | End: 2021-11-28 | Stop reason: HOSPADM

## 2021-11-19 RX ORDER — ONDANSETRON 4 MG/1
4 TABLET, ORALLY DISINTEGRATING ORAL EVERY 8 HOURS PRN
Status: DISCONTINUED | OUTPATIENT
Start: 2021-11-19 | End: 2021-11-28 | Stop reason: HOSPADM

## 2021-11-19 RX ORDER — POTASSIUM CHLORIDE 7.45 MG/ML
10 INJECTION INTRAVENOUS PRN
Status: DISCONTINUED | OUTPATIENT
Start: 2021-11-19 | End: 2021-11-28 | Stop reason: HOSPADM

## 2021-11-19 RX ORDER — MORPHINE SULFATE 2 MG/ML
2 INJECTION, SOLUTION INTRAMUSCULAR; INTRAVENOUS ONCE
Status: COMPLETED | OUTPATIENT
Start: 2021-11-19 | End: 2021-11-19

## 2021-11-19 RX ORDER — FENTANYL CITRATE 50 UG/ML
25 INJECTION, SOLUTION INTRAMUSCULAR; INTRAVENOUS EVERY 6 HOURS PRN
Status: DISCONTINUED | OUTPATIENT
Start: 2021-11-19 | End: 2021-11-21 | Stop reason: SDUPTHER

## 2021-11-19 RX ORDER — DONEPEZIL HYDROCHLORIDE 5 MG/1
5 TABLET, FILM COATED ORAL NIGHTLY
Status: DISCONTINUED | OUTPATIENT
Start: 2021-11-19 | End: 2021-11-24

## 2021-11-19 RX ORDER — ONDANSETRON 2 MG/ML
4 INJECTION INTRAMUSCULAR; INTRAVENOUS EVERY 6 HOURS PRN
Status: DISCONTINUED | OUTPATIENT
Start: 2021-11-19 | End: 2021-11-19 | Stop reason: SDUPTHER

## 2021-11-19 RX ORDER — SODIUM CHLORIDE 0.9 % (FLUSH) 0.9 %
10 SYRINGE (ML) INJECTION EVERY 12 HOURS SCHEDULED
Status: DISCONTINUED | OUTPATIENT
Start: 2021-11-19 | End: 2021-11-28 | Stop reason: HOSPADM

## 2021-11-19 RX ORDER — CITALOPRAM 10 MG/1
10 TABLET ORAL DAILY
Status: DISCONTINUED | OUTPATIENT
Start: 2021-11-19 | End: 2021-11-28 | Stop reason: HOSPADM

## 2021-11-19 RX ORDER — HYDROCODONE BITARTRATE AND ACETAMINOPHEN 5; 325 MG/1; MG/1
1 TABLET ORAL EVERY 4 HOURS PRN
Status: DISCONTINUED | OUTPATIENT
Start: 2021-11-19 | End: 2021-11-21 | Stop reason: SDUPTHER

## 2021-11-19 RX ORDER — ONDANSETRON 2 MG/ML
4 INJECTION INTRAMUSCULAR; INTRAVENOUS EVERY 6 HOURS PRN
Status: DISCONTINUED | OUTPATIENT
Start: 2021-11-19 | End: 2021-11-28 | Stop reason: HOSPADM

## 2021-11-19 RX ORDER — ACETAMINOPHEN 650 MG/1
650 SUPPOSITORY RECTAL EVERY 6 HOURS PRN
Status: DISCONTINUED | OUTPATIENT
Start: 2021-11-19 | End: 2021-11-28 | Stop reason: HOSPADM

## 2021-11-19 RX ADMIN — MORPHINE SULFATE 2 MG: 2 INJECTION, SOLUTION INTRAMUSCULAR; INTRAVENOUS at 02:13

## 2021-11-19 RX ADMIN — SODIUM CHLORIDE, PRESERVATIVE FREE 10 ML: 5 INJECTION INTRAVENOUS at 21:00

## 2021-11-19 RX ADMIN — FENTANYL CITRATE 25 MCG: 50 INJECTION, SOLUTION INTRAMUSCULAR; INTRAVENOUS at 03:40

## 2021-11-19 RX ADMIN — FENTANYL CITRATE 25 MCG: 50 INJECTION, SOLUTION INTRAMUSCULAR; INTRAVENOUS at 06:11

## 2021-11-19 RX ADMIN — ENOXAPARIN SODIUM 40 MG: 100 INJECTION SUBCUTANEOUS at 11:35

## 2021-11-19 RX ADMIN — FENTANYL CITRATE 25 MCG: 50 INJECTION, SOLUTION INTRAMUSCULAR; INTRAVENOUS at 11:31

## 2021-11-19 RX ADMIN — CITALOPRAM HYDROBROMIDE 10 MG: 10 TABLET ORAL at 11:34

## 2021-11-19 RX ADMIN — ACETAMINOPHEN 650 MG: 325 TABLET ORAL at 05:47

## 2021-11-19 RX ADMIN — HYDROCODONE BITARTRATE AND ACETAMINOPHEN 1 TABLET: 5; 325 TABLET ORAL at 21:16

## 2021-11-19 RX ADMIN — FENTANYL CITRATE 25 MCG: 0.05 INJECTION, SOLUTION INTRAMUSCULAR; INTRAVENOUS at 11:31

## 2021-11-19 RX ADMIN — SODIUM CHLORIDE 500 ML: 9 INJECTION, SOLUTION INTRAVENOUS at 03:40

## 2021-11-19 RX ADMIN — DONEPEZIL HYDROCHLORIDE 5 MG: 5 TABLET, FILM COATED ORAL at 21:16

## 2021-11-19 ASSESSMENT — PAIN SCALES - GENERAL
PAINLEVEL_OUTOF10: 5
PAINLEVEL_OUTOF10: 3
PAINLEVEL_OUTOF10: 10
PAINLEVEL_OUTOF10: 8
PAINLEVEL_OUTOF10: 10
PAINLEVEL_OUTOF10: 0
PAINLEVEL_OUTOF10: 10

## 2021-11-19 ASSESSMENT — PAIN DESCRIPTION - PAIN TYPE: TYPE: ACUTE PAIN

## 2021-11-19 ASSESSMENT — PAIN DESCRIPTION - ORIENTATION: ORIENTATION: LEFT

## 2021-11-19 ASSESSMENT — PAIN DESCRIPTION - LOCATION: LOCATION: LEG

## 2021-11-19 NOTE — ED NOTES
Dionicio FORTE at [address] [place] [address], [address] [place] [address]  11/19/21 5234

## 2021-11-19 NOTE — CARE COORDINATION
Social Work-[facility] is full. [facility] will eval patient after surgery. Will contact both [facility] and [facility] after surgery. Patient will need precert. Lex Lynn

## 2021-11-19 NOTE — CARE COORDINATION
Social Work-Spoke with son. Patient is currently in ER with fx  Hip. Family is deciding if she will have surgery or hospice care. If hospice, they prefer [address] [place] [address]. Son would like a referral sent to [facility] for hospice or rehab. Sent referrals to both facilities. Will need to finalize dc plans with son.  Ching Anthony

## 2021-11-19 NOTE — ED NOTES
Pt repositioned in bed with pillows under knees and ankles.  Pt states comfort     Saravanan Laird RN  11/19/21 1777

## 2021-11-19 NOTE — CONSULTS
[facility] MED SURG  [address]  [place] [address]  Dept: [phone]  Loc: [phone]    Inpatient Orthopedic Consult      CHIEF COMPLAINT:    left hip pain    HISTORY OF PRESENT ILLNESS:      The patient is a 81 y.o. female who is being seen as an inpatient for consultation and evaluation of the above complaint which occurred secondary to a fall from standing height. The patient experienced immediate pain at the hip and difficulty with ambulation/weight bearing. The patient was brought to the emergency department where imaging revealed a hip fracture, and Orthopaedics was consulted for evaluation and definitive treatment. The patient localizes the pain to the above hip. Prior to this, the patient used a walker for ambulation.      History is obtained today from:   [x]  the patient     [x]  EMR     [x]  one family member/friend    []  multiple family members/friends    []  other:   ER doctor, ER nurse         REVIEW OF SYSTEMS:  Musculoskeletal: See HPI for pertinent positives     Past Medical History:    Past Medical History:   Diagnosis Date    Dementia ([facility] [address]     Hypertension        Past Surgical History:    Past Surgical History:   Procedure Laterality Date    APPENDECTOMY      TUBAL LIGATION         Current Medications:   Current Facility-Administered Medications   Medication Dose Route Frequency Provider Last Rate Last Admin    donepezil (ARICEPT) tablet 5 mg  5 mg Oral Nightly Ines Aparicio, APRN - CNP        citalopram (CELEXA) tablet 10 mg  10 mg Oral Daily Ines Aparicio, APRN - CNP   10 mg at 11/19/21 1134    sodium chloride flush 0.9 % injection 10 mL  10 mL IntraVENous 2 times per day Ines Aparicio, APRN - CNP        sodium chloride flush 0.9 % injection 10 mL  10 mL IntraVENous PRN Ines Aparicio, APRN - CNP        0.9 % sodium chloride infusion  25 mL IntraVENous PRN Ines Aparicio, APRN - CNP        ondansetron (ZOFRAN-ODT) disintegrating tablet 4 mg  4 mg Oral Q8H PRN [facility], APRN - CNP        Or    ondansetron (ZOFRAN) injection 4 mg  4 mg IntraVENous Q6H PRN [facility], APRN - CNP        magnesium hydroxide (MILK OF MAGNESIA) 400 MG/5ML suspension 30 mL  30 mL Oral Daily PRN [facility], APRN - CNP        acetaminophen (TYLENOL) tablet 650 mg  650 mg Oral Q6H PRN [facility], APRN - CNP   650 mg at 11/19/21 0547    Or    acetaminophen (TYLENOL) suppository 650 mg  650 mg Rectal Q6H PRN [facility], APRN - CNP        potassium chloride (KLOR-CON M) extended release tablet 40 mEq  40 mEq Oral PRN [facility], APRN - CNP        Or    potassium bicarb-citric acid (EFFER-K) effervescent tablet 40 mEq  40 mEq Oral PRN [facility], APRN - CNP        Or    potassium chloride 10 mEq/100 mL IVPB (Peripheral Line)  10 mEq IntraVENous PRN [facility], APRN - CNP        enoxaparin (LOVENOX) injection 40 mg  40 mg SubCUTAneous Daily [facility], APRN - CNP   40 mg at 11/19/21 1135    fentaNYL (SUBLIMAZE) injection 25 mcg  25 mcg IntraVENous Q6H PRN Kristin Navarro MD   25 mcg at 11/19/21 1131    HYDROcodone-acetaminophen (NORCO) 5-325 MG per tablet 1 tablet  1 tablet Oral Q4H PRN Kristin Navarro MD           Allergies:    Patient has no known allergies. Family History:  Family History   Problem Relation Age of Onset    No Known Problems Mother     Heart Disease Father        Social History:   Social History     Socioeconomic History    Marital status:      Spouse name: Not on file    Number of children: Not on file    Years of education: Not on file    Highest education level: Not on file   Occupational History    Not on file   Tobacco Use    Smoking status: Never Smoker    Smokeless tobacco: Never Used   Substance and Sexual Activity    Alcohol use:  Yes     Alcohol/week: 1.0 standard drink     Types: 1 Glasses of wine per week     Comment: States \"One glass of wine a day\", also drinks margaritas  Drug use: No    Sexual activity: Not on file   Other Topics Concern    Not on file   Social History Narrative    Not on file     Social Determinants of Health     Financial Resource Strain:     Difficulty of Paying Living Expenses: Not on file   Food Insecurity:     Worried About Running Out of Food in the Last Year: Not on file    Teodora of Food in the Last Year: Not on file   Transportation Needs:     Lack of Transportation (Medical): Not on file    Lack of Transportation (Non-Medical): Not on file   Physical Activity:     Days of Exercise per Week: Not on file    Minutes of Exercise per Session: Not on file   Stress:     Feeling of Stress : Not on file   Social Connections:     Frequency of Communication with Friends and Family: Not on file    Frequency of Social Gatherings with Friends and Family: Not on file    Attends Church Services: Not on file    Active Member of [address] [place] [address] [employer] or Organizations: Not on file    Attends Club or Organization Meetings: Not on file    Marital Status: Not on file   Intimate Partner Violence:     Fear of Current or Ex-Partner: Not on file    Emotionally Abused: Not on file    Physically Abused: Not on file    Sexually Abused: Not on file   Housing Stability:     Unable to Pay for Housing in the Last Year: Not on file    Number of [place] in the Last Year: Not on file    Unstable Housing in the Last Year: Not on file     Lives in a nursing home secondary to dementia     OBJECTIVE:  BP (!) 167/81   Pulse 87   Temp 97.9 °F (36.6 °C) (Oral)   Resp 16   Ht 5' 6\" (1.676 m)   Wt 109 lb (49.4 kg)   SpO2 90%   BMI 17.59 kg/m²    Psych: Awake, alert. Does not answer questions, responds to noxious stimuli. Cardio:  well perfused extremities  Resp:  normal respiratory effort    Affected left lower extremity:    Vascular: Limb well perfused, compartments soft/compressible. Skin: No erythema/ulcers. Intact over the lateral aspect of the hip.   Neurovascular Status: Unable to adequately assess  Motion: Unable to adequately assess  Tenderness to Palpation: Hip diffusely  -Limb shortened and rotated      Secondary survey exam:  No gross deformity, erythema, or significant tenderness of other bony prominences/joints   No tenderness to palpation over other bony prominences/joints of the bilateral upper and lower extremities   Log roll test negative on contralateral hip  Grossly neurovascularly intact bilateral upper extremity without focal deficit      RADIOLOGY:   Radiographic images and reports reviewed. Displaced femoral neck fracture of the left hip. XR FEMUR LEFT (MIN 2 VIEWS)    Result Date: 11/19/2021  EXAMINATION: ONE XRAY VIEW OF THE PELVIS AND TWO XRAY VIEWS LEFT HIP; 2 XRAY VIEWS OF THE LEFT FEMUR 11/19/2021 2:44 am COMPARISON: None. HISTORY: ORDERING SYSTEM PROVIDED HISTORY: fall from standing TECHNOLOGIST PROVIDED HISTORY: fall from standing Reason for Exam: fall from standing Acuity: Acute Type of Exam: Initial FINDINGS: There is a fracture at left femoral neck extending into the medial aspect of the greater trochanter. There is resulting moderate varus angulation and some degree of overriding. The pelvis is intact as is the visualized proximal right femur. Soft tissues and visualized pelvic contents are noted for moderate atherosclerotic calcification. The remainder of the left femur is intact. Bone density is diffusely decreased. No significant joint abnormalities are evident. Left femoral neck fracture extending into the medial aspect of the greater trochanter with moderate varus angulation and some degree of overriding. Decreased bone density. Moderate atherosclerotic change noted.      CT Head WO Contrast    Result Date: 11/19/2021  EXAMINATION: CT OF THE HEAD WITHOUT CONTRAST  11/19/2021 2:12 am TECHNIQUE: CT of the head was performed without the administration of intravenous contrast. Dose modulation, iterative reconstruction, and/or weight based adjustment of the mA/kV was utilized to reduce the radiation dose to as low as reasonably achievable. COMPARISON: 06/19/2021 HISTORY: ORDERING SYSTEM PROVIDED HISTORY: fall TECHNOLOGIST PROVIDED HISTORY: fall Decision Support Exception - unselect if not a suspected or confirmed emergency medical condition->Emergency Medical Condition (MA) Reason for Exam: Head and neck pain s/p fall Acuity: Acute Type of Exam: Initial FINDINGS: BRAIN/VENTRICLES: There is no acute intracranial hemorrhage, mass effect or midline shift. No abnormal extra-axial fluid collection. The gray-white differentiation is maintained without evidence of an acute infarct. There is no evidence of hydrocephalus. Mild diffuse cerebral atrophy and chronic white matter ischemic change. ORBITS: The visualized portion of the orbits demonstrate no acute abnormality. SINUSES: The visualized paranasal sinuses and mastoid air cells demonstrate no acute abnormality. SOFT TISSUES/SKULL:  No acute abnormality of the visualized skull or soft tissues. No acute intracranial abnormality. CT Cervical Spine WO Contrast    Result Date: 11/19/2021  EXAMINATION: CT OF THE CERVICAL SPINE WITHOUT CONTRAST 11/19/2021 2:12 am TECHNIQUE: CT of the cervical spine was performed without the administration of intravenous contrast. Multiplanar reformatted images are provided for review. Dose modulation, iterative reconstruction, and/or weight based adjustment of the mA/kV was utilized to reduce the radiation dose to as low as reasonably achievable. COMPARISON: None. HISTORY: ORDERING SYSTEM PROVIDED HISTORY: fall TECHNOLOGIST PROVIDED HISTORY: fall Decision Support Exception - unselect if not a suspected or confirmed emergency medical condition->Emergency Medical Condition (MA) Reason for Exam: Head and neck pain s/p fall Acuity: Acute Type of Exam: Initial FINDINGS: BONES/ALIGNMENT: There is no acute fracture or traumatic malalignment.   Mild grade 1 degenerative anterolisthesis of C4 on C5 is noted. DEGENERATIVE CHANGES: Diffuse variable mild to moderate cervical spine degenerative changes are present most pronounced at C5-6 and C6-7. There is possibly significant foraminal stenosis on the right at C3-4 and C5-6. SOFT TISSUES: There is no prevertebral soft tissue swelling. No acute abnormality of the cervical spine. Cervical spine degenerative changes as detailed above. Possibly significant foraminal stenosis on the right at C3-4 and C5-6. CT HIP LEFT WO CONTRAST    Result Date: 11/19/2021  EXAMINATION: CT OF THE LEFT HIP WITHOUT CONTRAST 11/19/2021 3:37 am TECHNIQUE: CT of the left hip was performed without the administration of intravenous contrast.  Multiplanar reformatted images are provided for review. Dose modulation, iterative reconstruction, and/or weight based adjustment of the mA/kV was utilized to reduce the radiation dose to as low as reasonably achievable. COMPARISON: Pelvis and left hip plain film series obtained approximately 1 hour earlier. HISTORY ORDERING SYSTEM PROVIDED HISTORY: fem neck fx TECHNOLOGIST PROVIDED HISTORY: fem neck fx Decision Support Exception - unselect if not a suspected or confirmed emergency medical condition->Emergency Medical Condition (MA) Reason for Exam: fem neck fx Acuity: Acute Type of Exam: Initial FINDINGS: Bones: There is mildly comminuted fracture at left femoral neck with approximately 1 cm posterior displacement and 2-3 cm overriding. There is moderate varus angulation. Bone density is diffusely decreased. No other fracture is identified. Soft Tissue: Mild surrounding soft tissue swelling lateral to the left hip. Joint: No significant joint abnormality identified. Left femoral neck fracture as described. Osteoporosis.      XR HIP 2-3 VW W PELVIS LEFT    Result Date: 11/19/2021  EXAMINATION: ONE XRAY VIEW OF THE PELVIS AND TWO XRAY VIEWS LEFT HIP; 2 XRAY VIEWS OF THE LEFT FEMUR 11/19/2021 2:44 am COMPARISON: None. HISTORY: ORDERING SYSTEM PROVIDED HISTORY: fall from standing TECHNOLOGIST PROVIDED HISTORY: fall from standing Reason for Exam: fall from standing Acuity: Acute Type of Exam: Initial FINDINGS: There is a fracture at left femoral neck extending into the medial aspect of the greater trochanter. There is resulting moderate varus angulation and some degree of overriding. The pelvis is intact as is the visualized proximal right femur. Soft tissues and visualized pelvic contents are noted for moderate atherosclerotic calcification. The remainder of the left femur is intact. Bone density is diffusely decreased. No significant joint abnormalities are evident. Left femoral neck fracture extending into the medial aspect of the greater trochanter with moderate varus angulation and some degree of overriding. Decreased bone density. Moderate atherosclerotic change noted. LABS:   Lab Results   Component Value Date    WBC 10.7 11/19/2021    HGB 10.8 (L) 11/19/2021    HCT 34.9 (L) 11/19/2021    .6 11/19/2021     11/19/2021     Lab Results   Component Value Date     11/19/2021    K 4.1 11/19/2021     11/19/2021    CO2 25 11/19/2021    BUN 13 11/19/2021    CREATININE 0.71 11/19/2021    GLUCOSE 117 11/19/2021    CALCIUM 8.3 11/19/2021          ASSESSMENT AND PLAN:  Body mass index is 17.59 kg/m². She has a left hip displaced femoral neck fracture, sustained on 11/19/2021. Notably, she has the past medical history as above. She has a history of vitamin D deficiency, and dementia (lives in a nursing home, unable to answer questions). We had a discussion today about the likely diagnosis and its natural history, physical exam and imaging findings, as well as various treatment options in detail. Surgically, we discussed a hip hemiarthroplasty.  We discussed the risks of wound healing complication/infection, recurrent dislocation, possible future surgery, limb length discrepancy, muscle weakness, intraoperative fracture, bleeding/blood clots, malpositioning of the component, as well as the risks below. I called the patient's son over the phone, Gurwinder Garcia, the patient's power of , and we discussed the patient's case. We discussed that the risks of nonoperative treatment include pneumonia, urinary tract infection, skin breakdown/ulcer, and DVT/PE. We discussed that surgical intervention will hopefully increase the patient's function, allow early mobilization, and decrease but not prevent the above risks, as soon as it is safe medically to proceed, if they wish to proceed with surgery. We discussed the expected postoperative course, including the relevant restrictions. As a result of our discussion including risks/benefits/alternatives, they are able to make an informed decision, and have elected to proceed with surgical management. We spoke about the risks/benefits/alternatives to a surgical intervention. They understand that the risks of surgery may include but are not limited to pain, infection, bleeding, blood clot, damage to soft tissue/vessel/nerve, future surgery, scarring/stiffness, decreased strength/weakness, cosmetic deformity, neuroma/neuritis/phantom pains, delayed soft tissue/bone healing, nonunion, malunion, failure of hardware/fixation/surgery, iatrogenic fracture/dislocation, damage to bone/joint(s), worsening of condition, recurrence, limb length discrepancy, avascular necrosis of bone, recurrent dislocation, neurovascular compromise/compartment syndrome, tourniquet complications, failure of surgery, dissatisfaction with outcome, loss of limb, stroke, heart attack, pulmonary embolus, mental status change, anesthesia risks/reaction, and even death. They expressed verbal understanding of the risks and wish to proceed with surgical intervention. All questions were answered. No guarantees were made or implied.  Informed consent was obtained from the patient's power of , her son, Christel Brooks. -DVT prophylaxis  -pain control  -Medical optimization   -Abduction pillow  -Type and cross for 2 units  -NPO after midnight with IVF per Medicine + hold AM anticoagulation  -The plan is to proceed with the following: Left hip hemiarthroplasty    All questions were answered and the patient agrees with the above plan. I will continue to follow the patient while she is in the hospital.       Saleem Sue MD  Orthopedic Surgery        Please excuse any typos/errors, as this note was created with the assistance of voice recognition software. While intending to generate a document that actually reflects the content of the visit, the document can still have some errors including those of syntax and sound-a-like substitutions which may escape proof reading. In such instances, actual meaning can be extrapolated by context.

## 2021-11-19 NOTE — ED PROVIDER NOTES
EMERGENCY DEPARTMENT ENCOUNTER    Pt Name: Hellen Kaba  MRN: [id number]  [place] 1935  Date of evaluation: 21  CHIEF COMPLAINT       Chief Complaint   Patient presents with    Fall     left thigh pain     HISTORY OF PRESENT ILLNESS   Patient is an 61-year-old female with PMH of dementia who is brought in by EMS for evaluation of unwitnessed fall at nursing home. Patient is holding left leg and complaining of severe pain. Unclear whether she hit her head during fall. No other injuries noted. Patient unable to provide meaningful history secondary to dementia. Son at bedside. He reports up until today patient was able to ambulate with assistance from walker. REVIEW OF SYSTEMS     Review of Systems   Unable to perform ROS: Dementia     PASTMEDICAL HISTORY     Past Medical History:   Diagnosis Date    Dementia ([facility] [address]     Hypertension      SURGICAL HISTORY       Past Surgical History:   Procedure Laterality Date    APPENDECTOMY      TUBAL LIGATION       CURRENT MEDICATIONS       Previous Medications    ACETAMINOPHEN (TYLENOL) 500 MG TABLET    Take 1,000 mg by mouth every 8 hours    CITALOPRAM (CELEXA) 10 MG TABLET    Take 10 mg by mouth daily    CLONIDINE (CATAPRES) 0.1 MG TABLET    Take 0.1 mg by mouth nightly as needed    DONEPEZIL (ARICEPT) 5 MG TABLET    Take 5 mg by mouth nightly     ALLERGIES     has No Known Allergies. FAMILY HISTORY     She indicated that her mother is . She indicated that her father is . SOCIAL HISTORY       Social History     Tobacco Use    Smoking status: Never Smoker    Smokeless tobacco: Never Used   Substance Use Topics    Alcohol use:  Yes     Alcohol/week: 1.0 standard drink     Types: 1 Glasses of wine per week     Comment: States \"One glass of wine a day\", also drinks margaritas    Drug use: No     PHYSICAL EXAM     INITIAL VITALS: BP (!) 196/98   Pulse 82   Temp 98.4 °F (36.9 °C) (Axillary)   Resp 18   Ht 5' 6\" (1.676 m)   Wt 109 lb (49.4 kg)   SpO2 93%   BMI 17.59 kg/m²    Physical Exam  Constitutional:       Comments: Lying in exam bed, holding left upper leg, in obvious discomfort from pain. HENT:      Head: Normocephalic. Right Ear: External ear normal.      Left Ear: External ear normal.      Nose: Nose normal.   Eyes:      Conjunctiva/sclera: Conjunctivae normal.   Cardiovascular:      Rate and Rhythm: Normal rate. Pulmonary:      Effort: Pulmonary effort is normal.   Abdominal:      General: Abdomen is flat. Musculoskeletal:      Comments: Tender left leg, no bony deformities. Strong left DP pulses. Normal cap refill left lower extremity. Skin:     General: Skin is dry. Neurological:      Mental Status: She is alert. Mental status is at baseline. Psychiatric:         Mood and Affect: Mood normal.         Behavior: Behavior normal.         MEDICAL DECISION MAKING:   The patient is hemodynamically stable, afebrile, nontoxic-appearing. Physical exam notable for tenderness left leg. Based on history and exam likely a fracture. ED plan for analgesia, basic labs, x-ray left hip, reassess. DIAGNOSTIC RESULTS   EKG:All EKG's are interpreted by the Emergency Department Physician who either signs or Co-signs this chart in the absence of a cardiologist.        RADIOLOGY:All plain film, CT, MRI, and formal ultrasound images (except ED bedside ultrasound) are read by the radiologist, see reports below, unless otherwisenoted in MDM or here. XR HIP 2-3 VW W PELVIS LEFT   Final Result   Left femoral neck fracture extending into the medial aspect of the greater   trochanter with moderate varus angulation and some degree of overriding. Decreased bone density. Moderate atherosclerotic change noted. XR FEMUR LEFT (MIN 2 VIEWS)   Final Result   Left femoral neck fracture extending into the medial aspect of the greater   trochanter with moderate varus angulation and some degree of overriding. Decreased bone density. Moderate atherosclerotic change noted. CT Head WO Contrast   Final Result   No acute intracranial abnormality. CT Cervical Spine WO Contrast   Final Result   No acute abnormality of the cervical spine. Cervical spine degenerative changes as detailed above. Possibly significant   foraminal stenosis on the right at C3-4 and C5-6. XR FEMUR LEFT 1 VW    (Results Pending)   CT HIP LEFT WO CONTRAST    (Results Pending)     LABS: All lab results were reviewed by myself, and all abnormals are listed below. Labs Reviewed   CBC WITH AUTO DIFFERENTIAL - Abnormal; Notable for the following components:       Result Value    RBC 3.40 (*)     Hemoglobin 10.8 (*)     Hematocrit 34.9 (*)     Seg Neutrophils 78 (*)     Lymphocytes 9 (*)     Immature Granulocytes 1 (*)     Segs Absolute 8.46 (*)     Absolute Lymph # 1.00 (*)     All other components within normal limits   COMPREHENSIVE METABOLIC PANEL W/ REFLEX TO MG FOR LOW K - Abnormal; Notable for the following components:    Glucose 113 (*)     Total Bilirubin 0.28 (*)     Total Protein 6.2 (*)     All other components within normal limits   COVID-19, RAPID       EMERGENCY DEPARTMENTCOURSE:   Patient remained stable in the ED. X-ray shows left femoral neck fracture. Discussed case with Dr. Sharyle Fontana, patient continue to eat, surgery not likely on Friday. Requests AP view of pelvis, full view of left femur, CT left hip.     Discussed case with admitting team for Dr. Shreya Worthington who accepts patient for admission to hospital.      Vitals:    Vitals:    11/19/21 0101   BP: (!) 196/98   Pulse: 82   Resp: 18   Temp: 98.4 °F (36.9 °C)   TempSrc: Axillary   SpO2: 93%   Weight: 109 lb (49.4 kg)   Height: 5' 6\" (1.676 m)       The patient was given the following medications while in the emergency department:  Orders Placed This Encounter   Medications    0.9 % sodium chloride bolus    morphine (PF) injection 2 mg    fentaNYL (SUBLIMAZE) injection 25 mcg     CONSULTS:  IP CONSULT TO ORTHOPEDIC SURGERY  IP CONSULT TO INTERNAL MEDICINE  IP CONSULT TO SOCIAL WORK    FINAL IMPRESSION      1. Closed fracture of neck of left femur, initial [facility]          DISPOSITION/PLAN   DISPOSITION Admitted 11/19/2021 03:58:29 AM      PATIENT REFERRED TO:  No follow-up provider specified.   DISCHARGE MEDICATIONS:  New Prescriptions    No medications on file     Madelaine Bailey MD  Attending Emergency Physician                    Lubna Foote MD  11/19/21 5609

## 2021-11-19 NOTE — PROGRESS NOTES
Pt admitted to room #2001 from ER  Oriented to room and call light/tv controls. Bed in lowest position, wheels locked, 2/4 side rails up. Gown changed, purewick replaced, and patient repositioned. Call light in reach, room free of clutter, adequate lighting provided.

## 2021-11-19 NOTE — H&P
History & Physical  [facility]    Adult Hospitalist      Name: Hellen Kaba  MRN: [id number]     Acct: [de-identified]  Room: [facility]/16    Admit Date: 11/19/2021  1:00 AM  PCP: Arnoldo Turk MD    Primary Problem  Active Problems:    Left displaced femoral neck fracture ([address]  Resolved Problems:    * No resolved hospital problems. *        Assesment:       · Left displaced femoral neck fracture  · Hypertension  · Depression  · Dementia           Plan:     · Admit to MedSurg telemetry  · O2 maintain oxygen saturation greater than 92%. · Pain control  · Orthopedic consult  · PT OT  · Continue to monitor/telemetry/CBC with differential daily/BMP daily  · DVT and GI prophylaxis. Continue medications as below  ·   · Patient is moderate risk for proposed surgery      Scheduled Meds:   donepezil  5 mg Oral Nightly    citalopram  10 mg Oral Daily    enoxaparin  40 mg SubCUTAneous Daily     Continuous Infusions:   sodium chloride       PRN Meds:  sodium chloride, 25 mL, PRN  ondansetron, 4 mg, Q8H PRN   Or  ondansetron, 4 mg, Q6H PRN  magnesium hydroxide, 30 mL, Daily PRN  acetaminophen, 650 mg, Q6H PRN   Or  acetaminophen, 650 mg, Q6H PRN  potassium chloride, 40 mEq, PRN   Or  potassium alternative oral replacement, 40 mEq, PRN   Or  potassium chloride, 10 mEq, PRN  fentanNYL, 25 mcg, Q6H PRN  HYDROcodone 5 mg - acetaminophen, 1 tablet, Q4H PRN        Chief Complaint:     Chief Complaint   Patient presents with    Fall     left thigh pain         History of Present Illness:      Hellen Kaba is a 81 y.o.  female who presents with Fall (left thigh pain)      71-year-old lady with past medical history of hypertension presented to ER after an unwitnessed fall at nursing home. Patient is complaining of left leg pain and complaining of severe pain. Patient historian    Hemoglobin was 10.8. BMP was unremarkable. Blood pressure was 157/86. CT head and cervical spine was negative. X-ray hip shows left femoral neck fracture extending into the medial aspect of the greater trochanter with moderate varus angulation and some degree of overriding. I have personally reviewed the past medical history, past surgical history, medications, social history, and family history, and summarized in the note. Review of Systems:     All 10 point system is reviewed and negative otherwise mentioned in HPI. Past Medical History:     Past Medical History:   Diagnosis Date    Dementia ([address]     Hypertension         Past Surgical History:     Past Surgical History:   Procedure Laterality Date    APPENDECTOMY      TUBAL LIGATION          Medications Prior to Admission:       Prior to Admission medications    Medication Sig Start Date End Date Taking? Authorizing Provider   acetaminophen (TYLENOL) 500 MG tablet Take 1,000 mg by mouth every 8 hours 21  Yes Historical Provider, MD   citalopram (CELEXA) 10 MG tablet Take 10 mg by mouth daily    Historical Provider, MD   donepezil (ARICEPT) 5 MG tablet Take 5 mg by mouth nightly    Historical Provider, MD   cloNIDine (CATAPRES) 0.1 MG tablet Take 0.1 mg by mouth nightly as needed    Historical Provider, MD        Allergies:       Patient has no known allergies. Social History:     Tobacco:    reports that she has never smoked. She has never used smokeless tobacco.  Alcohol:      reports current alcohol use of about 1.0 standard drink of alcohol per week. Drug Use:  reports no history of drug use.     Family History:     Family History   Problem Relation Age of Onset    No Known Problems Mother     Heart Disease Father          Physical Exam:     Vitals:  BP (!) 157/86   Pulse 86   Temp 98.4 °F (36.9 °C) (Axillary)   Resp 16   Ht 5' 6\" (1.676 m)   Wt 109 lb (49.4 kg)   SpO2 96%   BMI 17.59 kg/m²   Temp (24hrs), Av.4 °F (36.9 °C), Min:98.4 °F (36.9 °C), Max:98.4 °F (36.9 °C)          General appearance - alert, well appearing, and in no acute distress  Mental status - oriented to person, place, and time with normal affect  Head - normocephalic and atraumatic  Eyes - pupils equal and reactive, extraocular eye movements intact, conjunctiva clear  Ears - hearing appears to be intact  Nose - no drainage noted  Mouth - mucous membranes moist  Neck - supple, no carotid bruits, thyroid not palpable  Chest - clear to auscultation, normal effort  Heart - normal rate, regular rhythm, no murmur  Abdomen - soft, nontender, nondistended, bowel sounds present all four quadrants, no masses, hepatomegaly or splenomegaly  Neurological - normal speech, no focal findings or movement disorder noted, cranial nerves II through XII grossly intact  Extremities - peripheral pulses palpable, no pedal edema or calf pain with palpation  Skin - no gross lesions, rashes, or induration noted        Data:     Labs:    Hematology:  Recent Labs     11/19/21 0252   WBC 10.7   RBC 3.40*   HGB 10.8*   HCT 34.9*   .6   MCH 31.8   MCHC 30.9   RDW 14.4      MPV 9.4     Chemistry:  Recent Labs     11/19/21 0252 11/19/21  0833    138   K 4.2 4.1    101   CO2 24 25   GLUCOSE 113* 117*   BUN 15 13   CREATININE 0.79 0.71   ANIONGAP 13 12   LABGLOM >60 >60   GFRAA >60 >60   CALCIUM 8.6 8.3*     Recent Labs     11/19/21 0252   PROT 6.2*   LABALBU 3.8   AST 16   ALT 7   ALKPHOS 64   BILITOT 0.28*       No results found for: INR, PROTIME    Lab Results   Component Value Date/Time    SPECIAL NOT REPORTED 06/19/2021 05:10 PM     Lab Results   Component Value Date/Time    CULTURE NO GROWTH 06/19/2021 05:10 PM       No results found for: POCPH, PHART, PH, POCPCO2, YIX0KVM, PCO2, POCPO2, PO2ART, PO2, POCHCO3, PVJ1TJZ, HCO3, NBEA, PBEA, BEART, BE, THGBART, THB, HAP2IRJ, MQFA4QFV, M3ZGHLKC, O2SAT, FIO2    Radiology:    XR FEMUR LEFT (MIN 2 VIEWS)    Result Date: 11/19/2021  Left femoral neck fracture extending into the medial aspect of the greater trochanter with moderate varus angulation and some degree of overriding. Decreased bone density. Moderate atherosclerotic change noted. CT Head WO Contrast    Result Date: 11/19/2021  No acute intracranial abnormality. CT Cervical Spine WO Contrast    Result Date: 11/19/2021  No acute abnormality of the cervical spine. Cervical spine degenerative changes as detailed above. Possibly significant foraminal stenosis on the right at C3-4 and C5-6. CT HIP LEFT WO CONTRAST    Result Date: 11/19/2021  Left femoral neck fracture as described. Osteoporosis. XR HIP 2-3 VW W PELVIS LEFT    Result Date: 11/19/2021  Left femoral neck fracture extending into the medial aspect of the greater trochanter with moderate varus angulation and some degree of overriding. Decreased bone density. Moderate atherosclerotic change noted. All radiological studies reviewed                Code Status:  Full Code    Electronically signed by Kathleen Jimenez MD on 11/19/2021 at 4:12 PM     Copy sent to Dr. Elroy Shea MD    This note was created with the assistance of a speech-recognition program.  Although the intention is to generate a document that actually reflects the content of the visit, no guarantees can be provided that every mistake has been identified and corrected by editing. Note was updated later by me after  physical examination and  completion of the assessment.

## 2021-11-19 NOTE — ED NOTES
Bed: 16  Expected date:   Expected time:   Means of arrival:   Comments:  rodrigo Reddy RN  11/19/21 0107

## 2021-11-20 LAB
ABSOLUTE EOS #: 0.23 K/UL (ref 0–0.4)
ABSOLUTE IMMATURE GRANULOCYTE: 0 K/UL (ref 0–0.3)
ABSOLUTE LYMPH #: 0.58 K/UL (ref 1–4.8)
ABSOLUTE MONO #: 1.04 K/UL (ref 0.2–0.8)
ANION GAP SERPL CALCULATED.3IONS-SCNC: 12 MMOL/L (ref 9–17)
BASOPHILS # BLD: 0 %
BASOPHILS ABSOLUTE: 0 K/UL (ref 0–0.2)
BUN BLDV-MCNC: 16 MG/DL (ref 8–23)
BUN/CREAT BLD: 21 (ref 9–20)
CALCIUM SERPL-MCNC: 8.5 MG/DL (ref 8.6–10.4)
CHLORIDE BLD-SCNC: 99 MMOL/L (ref 98–107)
CO2: 24 MMOL/L (ref 20–31)
CREAT SERPL-MCNC: 0.78 MG/DL (ref 0.5–0.9)
DIFFERENTIAL TYPE: ABNORMAL
EOSINOPHILS RELATIVE PERCENT: 2 % (ref 1–4)
GFR AFRICAN AMERICAN: >60 ML/MIN
GFR NON-AFRICAN AMERICAN: >60 ML/MIN
GFR SERPL CREATININE-BSD FRML MDRD: ABNORMAL ML/MIN/{1.73_M2}
GFR SERPL CREATININE-BSD FRML MDRD: ABNORMAL ML/MIN/{1.73_M2}
GLUCOSE BLD-MCNC: 109 MG/DL (ref 70–99)
HCT VFR BLD CALC: 34.7 % (ref 36.3–47.1)
HEMOGLOBIN: 11.1 G/DL (ref 11.9–15.1)
IMMATURE GRANULOCYTES: 0 %
INR BLD: 1.2
LV EF: 55 %
LVEF MODALITY: NORMAL
LYMPHOCYTES # BLD: 5 % (ref 24–44)
MCH RBC QN AUTO: 31.5 PG (ref 25.2–33.5)
MCHC RBC AUTO-ENTMCNC: 32 G/DL (ref 28.4–34.8)
MCV RBC AUTO: 98.6 FL (ref 82.6–102.9)
MONOCYTES # BLD: 9 % (ref 1–7)
NRBC AUTOMATED: 0 PER 100 WBC
PDW BLD-RTO: 14.2 % (ref 11.8–14.4)
PLATELET # BLD: 221 K/UL (ref 138–453)
PLATELET ESTIMATE: ABNORMAL
PMV BLD AUTO: 9.2 FL (ref 8.1–13.5)
POTASSIUM SERPL-SCNC: 4.3 MMOL/L (ref 3.7–5.3)
PROTHROMBIN TIME: 14.8 SEC (ref 11.5–14.2)
RBC # BLD: 3.52 M/UL (ref 3.95–5.11)
RBC # BLD: ABNORMAL 10*6/UL
SEG NEUTROPHILS: 84 % (ref 36–66)
SEGMENTED NEUTROPHILS ABSOLUTE COUNT: 9.75 K/UL (ref 1.8–7.7)
SODIUM BLD-SCNC: 135 MMOL/L (ref 135–144)
WBC # BLD: 11.6 K/UL (ref 3.5–11.3)
WBC # BLD: ABNORMAL 10*3/UL

## 2021-11-20 PROCEDURE — 6360000002 HC RX W HCPCS: Performed by: NURSE PRACTITIONER

## 2021-11-20 PROCEDURE — 85025 COMPLETE CBC W/AUTO DIFF WBC: CPT

## 2021-11-20 PROCEDURE — 6370000000 HC RX 637 (ALT 250 FOR IP): Performed by: NURSE PRACTITIONER

## 2021-11-20 PROCEDURE — 2700000000 HC OXYGEN THERAPY PER DAY

## 2021-11-20 PROCEDURE — 94761 N-INVAS EAR/PLS OXIMETRY MLT: CPT

## 2021-11-20 PROCEDURE — 80048 BASIC METABOLIC PNL TOTAL CA: CPT

## 2021-11-20 PROCEDURE — 93306 TTE W/DOPPLER COMPLETE: CPT

## 2021-11-20 PROCEDURE — 2580000003 HC RX 258: Performed by: NURSE PRACTITIONER

## 2021-11-20 PROCEDURE — 85610 PROTHROMBIN TIME: CPT

## 2021-11-20 PROCEDURE — 36415 COLL VENOUS BLD VENIPUNCTURE: CPT

## 2021-11-20 PROCEDURE — 6370000000 HC RX 637 (ALT 250 FOR IP): Performed by: HOSPITALIST

## 2021-11-20 PROCEDURE — 1200000000 HC SEMI PRIVATE

## 2021-11-20 PROCEDURE — 2580000003 HC RX 258: Performed by: HOSPITALIST

## 2021-11-20 RX ORDER — DEXTROSE AND SODIUM CHLORIDE 5; .45 G/100ML; G/100ML
INJECTION, SOLUTION INTRAVENOUS CONTINUOUS
Status: DISCONTINUED | OUTPATIENT
Start: 2021-11-20 | End: 2021-11-28 | Stop reason: HOSPADM

## 2021-11-20 RX ADMIN — CITALOPRAM HYDROBROMIDE 10 MG: 10 TABLET ORAL at 08:54

## 2021-11-20 RX ADMIN — DEXTROSE AND SODIUM CHLORIDE: 5; 450 INJECTION, SOLUTION INTRAVENOUS at 19:53

## 2021-11-20 RX ADMIN — HYDROCODONE BITARTRATE AND ACETAMINOPHEN 1 TABLET: 5; 325 TABLET ORAL at 13:16

## 2021-11-20 RX ADMIN — HYDROCODONE BITARTRATE AND ACETAMINOPHEN 1 TABLET: 5; 325 TABLET ORAL at 03:18

## 2021-11-20 RX ADMIN — SODIUM CHLORIDE, PRESERVATIVE FREE 10 ML: 5 INJECTION INTRAVENOUS at 19:58

## 2021-11-20 RX ADMIN — SODIUM CHLORIDE, PRESERVATIVE FREE 10 ML: 5 INJECTION INTRAVENOUS at 08:54

## 2021-11-20 RX ADMIN — ENOXAPARIN SODIUM 40 MG: 100 INJECTION SUBCUTANEOUS at 08:54

## 2021-11-20 ASSESSMENT — PAIN DESCRIPTION - PAIN TYPE: TYPE: ACUTE PAIN

## 2021-11-20 ASSESSMENT — PAIN DESCRIPTION - ORIENTATION: ORIENTATION: LEFT

## 2021-11-20 ASSESSMENT — PAIN SCALES - GENERAL
PAINLEVEL_OUTOF10: 6
PAINLEVEL_OUTOF10: 0
PAINLEVEL_OUTOF10: 0

## 2021-11-20 ASSESSMENT — PAIN DESCRIPTION - FREQUENCY: FREQUENCY: OTHER (COMMENT)

## 2021-11-20 ASSESSMENT — PAIN DESCRIPTION - LOCATION: LOCATION: HIP;LEG

## 2021-11-20 ASSESSMENT — PAIN DESCRIPTION - DESCRIPTORS: DESCRIPTORS: ACHING

## 2021-11-20 NOTE — PROGRESS NOTES
Pt's surgery will take place tomorrow instead of today so pt can get cardio clearance as per Dr Sharyle Fontana. Dr Hubert Patel notified.

## 2021-11-20 NOTE — PLAN OF CARE
Problem: Falls - Risk of:  Goal: Will remain free from falls  Description: Will remain free from falls  Outcome: Ongoing     Problem: Falls - Risk of:  Goal: Absence of physical injury  Description: Absence of physical injury  Outcome: Ongoing     Patient is a fall risk during this admission. Fall risk assessment was performed. Patient is absent of falls. Bed is in the lowest position. Wheels on the bed are locked. Call light and bed side table are within reach. Clutter is removed. Patient was educated to call out when needing assistance or wanting to get out of bed. Patient offered toileting assistance during rounding. Hourly rounds have been performed.

## 2021-11-20 NOTE — PROGRESS NOTES
PATIENT'S SURGERY FOR TODAY WAS CANCELLED BECAUSE OF NOT HAVING CARDIAC CLEARANCE. ALL TEAM AWARE AT THIS TIME.

## 2021-11-20 NOTE — FLOWSHEET NOTE
Patient is observed by aid Shreya Zhong. Shreya Zhong states dementia. Patient did not respond to and questions asked. Shreya Zhong states son is very involved. States patient to have hip replacement.  shared in presence, prayers. Follow up as needed. 11/20/21 1550   Encounter Summary   Services provided to: Patient   Referral/Consult From: [address] [place] [address] Road Visiting   (11-20-21)   Complexity of Encounter Moderate   Length of Encounter 15 minutes   Spiritual Assessment Completed Yes   Routine   Type Initial   Assessment Passive; Unable to respond   Intervention Explored feelings, thoughts, concerns; Active listening; Prayer; Discussed illness/injury and it's impact;  Discussed belief system/Druze practices/natalia   Outcome Expressed gratitude; Venting emotion; Engaged in conversation; Expressed feelings/needs/concerns

## 2021-11-20 NOTE — PLAN OF CARE
Problem: Skin Integrity:  Goal: Will show no infection signs and symptoms  Description: Will show no infection signs and symptoms  Outcome: Ongoing  Goal: Absence of new skin breakdown  Description: Absence of new skin breakdown  Outcome: Ongoing     Problem: Falls - Risk of:  Goal: Will remain free from falls  Description: Will remain free from falls  11/20/2021 1541 by Kimberley Magana RN  Outcome: Ongoing  11/20/2021 0233 by Adriane Rubio RN  Outcome: Ongoing  Goal: Absence of physical injury  Description: Absence of physical injury  11/20/2021 1541 by Kimberley Magana RN  Outcome: Ongoing  11/20/2021 0233 by Adriane Rubio RN  Outcome: Ongoing     Problem: SAFETY  Goal: Free from accidental physical injury  Outcome: Ongoing     Problem: DAILY CARE  Goal: Daily care needs are met  Outcome: Ongoing     Problem: PAIN  Goal: Patient's pain/discomfort is manageable  Outcome: Ongoing     Problem: SKIN INTEGRITY  Goal: Skin integrity is maintained or improved  Outcome: Ongoing     Problem: KNOWLEDGE DEFICIT  Goal: Patient/S.O. demonstrates understanding of disease process, treatment plan, medications, and discharge instructions.   Outcome: Ongoing     Problem: DISCHARGE BARRIERS  Goal: Patient's continuum of care needs are met  Outcome: Ongoing

## 2021-11-20 NOTE — CONSULTS
[facility] Cardiology   Consult Note             Date:   11/20/2021  Patient name: Benjie Precise  Date of admission:  11/19/2021  1:00 AM  MRN:   [id number]  YOB: 1935    Reason for Admission: Fall with fracture of left leg    CHIEF COMPLAINT:  Left leg fracture for preoperative cardiac evaluation. History Obtained From:  electronic medical record    HISTORY OF PRESENT ILLNESS:    27-year-old lady with past medical history of hypertension presented to ER after an unwitnessed fall at nursing home. Patient is complaining of left leg pain and complaining of severe pain. Patient historian     Hemoglobin was 10.8. BMP was unremarkable. Blood pressure was 157/86. CT head and cervical spine was negative. X-ray hip shows left femoral neck fracture extending into the medial aspect of the greater trochanter with moderate varus angulation and some degree of overriding. Cardiology was consulted for preoperative cardiac evaluation. Son is at the bedside. She has no cardiac history. No chest pain or shortness of breath. No history of cad. Past Medical History:   has a past medical history of Dementia ([address] and Hypertension. Past Surgical History:   has a past surgical history that includes Tubal ligation and Appendectomy. Home Medications:    Prior to Admission medications    Medication Sig Start Date End Date Taking? Authorizing Provider   acetaminophen (TYLENOL) 500 MG tablet Take 1,000 mg by mouth every 8 hours 8/1/21  Yes Historical Provider, MD   citalopram (CELEXA) 10 MG tablet Take 10 mg by mouth daily    Historical Provider, MD   donepezil (ARICEPT) 5 MG tablet Take 5 mg by mouth nightly    Historical Provider, MD   cloNIDine (CATAPRES) 0.1 MG tablet Take 0.1 mg by mouth nightly as needed    Historical Provider, MD       Allergies:  Patient has no known allergies. Social History:   reports that she has never smoked.  She has never used smokeless tobacco. She reports current alcohol use of about 1.0 standard drink of alcohol per week. She reports that she does not use drugs. Family History: family history includes Heart Disease in her father; No Known Problems in her mother. REVIEW OF SYSTEMS:    Unable to do due to dementia  PHYSICAL EXAM:    Physical Examination:    BP (!) 148/69   Pulse 77   Temp 97.3 °F (36.3 °C) (Axillary)   Resp 16   Ht 5' 6\" (1.676 m)   Wt 109 lb (49.4 kg)   SpO2 96%   BMI 17.59 kg/m²    Constitutional and General Appearance: alert, cooperative, no distress and appears stated age  HEENT: PERRL, no cervical lymphadenopathy. No masses palpable. Normal oral mucosa  Respiratory:  · Normal excursion and expansion without use of accessory muscles  · Resp Auscultation: Good respiratory effort. No for increased work of breathing. On auscultation: clear to auscultation bilaterally  Cardiovascular:  · The apical impulse is not displaced  · Heart tones are crisp and normal. regular S1 and S2.  · Jugular venous pulsation Normal  · The carotid upstroke is normal in amplitude and contour without delay or bruit  · Peripheral pulses are symmetrical and full   Abdomen:  · No masses or tenderness  · Bowel sounds present        DATA:    Diagnostics:      EKG:Normal sinus rhythm    Labs:     CBC:   Recent Labs     11/19/21  0252 11/20/21  0526   WBC 10.7 11.6*   HGB 10.8* 11.1*   HCT 34.9* 34.7*    221     BMP:   Recent Labs     11/19/21  0833 11/20/21  0526    135   K 4.1 4.3   CO2 25 24   BUN 13 16   CREATININE 0.71 0.78   LABGLOM >60 >60   GLUCOSE 117* 109*     BNP: No results for input(s): BNP in the last 72 hours. PT/INR:   Recent Labs     11/20/21  0526   PROTIME 14.8*   INR 1.2     APTT:No results for input(s): APTT in the last 72 hours. CARDIAC ENZYMES:No results for input(s): CKTOTAL, CKMB, CKMBINDEX, TROPONINI in the last 72 hours.   FASTING LIPID PANEL:  Lab Results   Component Value Date    HDL 62 06/20/2021    TRIG 110 06/20/2021     LIVER

## 2021-11-20 NOTE — PROGRESS NOTES
Progress note  [facility]    Adult Hospitalist      Name: Benjie Pope  MRN: [id number]     [place]: [de-identified]  Room: 2001/2001-02    Admit Date: 11/19/2021  1:00 AM  PCP: Josh Heller MD    Primary Problem  Active Problems:    Left displaced femoral neck fracture ([address]  Resolved Problems:    * No resolved hospital problems. *        Assesment:       · Left displaced femoral neck fracture  · Hypertension  · Depression  · Advanced Dementia           Plan:     · Admit to MedSurg telemetry  · O2 maintain oxygen saturation greater than 92%. · Pain control  · Orthopedic consult, surgical plans as per ortho   · Orthopedic  · PT OT  · Continue to monitor/telemetry/CBC with differential daily/BMP daily  · DVT and GI prophylaxis. Continue medications as below      Scheduled Meds:   donepezil  5 mg Oral Nightly    citalopram  10 mg Oral Daily    sodium chloride flush  10 mL IntraVENous 2 times per day    enoxaparin  40 mg SubCUTAneous Daily     Continuous Infusions:   sodium chloride       PRN Meds:  sodium chloride flush, 10 mL, PRN  sodium chloride, 25 mL, PRN  ondansetron, 4 mg, Q8H PRN   Or  ondansetron, 4 mg, Q6H PRN  magnesium hydroxide, 30 mL, Daily PRN  acetaminophen, 650 mg, Q6H PRN   Or  acetaminophen, 650 mg, Q6H PRN  potassium chloride, 40 mEq, PRN   Or  potassium alternative oral replacement, 40 mEq, PRN   Or  potassium chloride, 10 mEq, PRN  fentanNYL, 25 mcg, Q6H PRN  HYDROcodone 5 mg - acetaminophen, 1 tablet, Q4H PRN        Chief Complaint:     Chief Complaint   Patient presents with    Fall     left thigh pain         History of Present Illness:      Patient seen and examined at bedside. Patient is very confused and unable to provide meaningful history .   However she denies any chest pain, shortness of breath, palpitation, headache, dizziness, cough, cold, changes in urination or bowel habits        HPI:    Benjie Pope is a 81 y.o.  female who presents with Fall (left thigh pain)      50-year-old lady with past medical history of hypertension presented to ER after an unwitnessed fall at nursing home. Patient is complaining of left leg pain and complaining of severe pain. Patient historian    Hemoglobin was 10.8. BMP was unremarkable. Blood pressure was 157/86. CT head and cervical spine was negative. X-ray hip shows left femoral neck fracture extending into the medial aspect of the greater trochanter with moderate varus angulation and some degree of overriding. I have personally reviewed the past medical history, past surgical history, medications, social history, and family history, and summarized in the note. Review of Systems:     All 10 point system is reviewed and negative otherwise mentioned in HPI. Past Medical History:     Past Medical History:   Diagnosis Date    Dementia ([facility]     Hypertension         Past Surgical History:     Past Surgical History:   Procedure Laterality Date    APPENDECTOMY      TUBAL LIGATION          Medications Prior to Admission:       Prior to Admission medications    Medication Sig Start Date End Date Taking? Authorizing Provider   acetaminophen (TYLENOL) 500 MG tablet Take 1,000 mg by mouth every 8 hours 8/1/21  Yes Historical Provider, MD   citalopram (CELEXA) 10 MG tablet Take 10 mg by mouth daily    Historical Provider, MD   donepezil (ARICEPT) 5 MG tablet Take 5 mg by mouth nightly    Historical Provider, MD   cloNIDine (CATAPRES) 0.1 MG tablet Take 0.1 mg by mouth nightly as needed    Historical Provider, MD        Allergies:       Patient has no known allergies. Social History:     Tobacco:    reports that she has never smoked. She has never used smokeless tobacco.  Alcohol:      reports current alcohol use of about 1.0 standard drink of alcohol per week. Drug Use:  reports no history of drug use.     Family History:     Family History   Problem Relation Age of Onset    No Known Problems Mother     Heart Disease Father          Physical Exam:     Vitals:  /69   Pulse 79   Temp 98.5 °F (36.9 °C) (Axillary)   Resp 16   Ht 5' 6\" (1.676 m)   Wt 109 lb (49.4 kg)   SpO2 96%   BMI 17.59 kg/m²   Temp (24hrs), Av °F (36.7 °C), Min:97.3 °F (36.3 °C), Max:98.5 °F (36.9 °C)          General appearance - alert, well appearing, and in no acute distress  Mental status - oriented to person, place, and time with normal affect  Head - normocephalic and atraumatic  Eyes - pupils equal and reactive, extraocular eye movements intact, conjunctiva clear  Ears - hearing appears to be intact  Nose - no drainage noted  Mouth - mucous membranes moist  Neck - supple, no carotid bruits, thyroid not palpable  Chest - clear to auscultation, normal effort  Heart - normal rate, regular rhythm, no murmur  Abdomen - soft, nontender, nondistended, bowel sounds present all four quadrants, no masses, hepatomegaly or splenomegaly  Neurological - normal speech, no focal findings or movement disorder noted, cranial nerves II through XII grossly intact  Extremities - peripheral pulses palpable, no pedal edema or calf pain with palpation  Skin - no gross lesions, rashes, or induration noted        Data:     Labs:    Hematology:  Recent Labs     21  0526   WBC 10.7 11.6*   RBC 3.40* 3.52*   HGB 10.8* 11.1*   HCT 34.9* 34.7*   .6 98.6   MCH 31.8 31.5   MCHC 30.9 32.0   RDW 14.4 14.2    221   MPV 9.4 9.2   INR  --  1.2     Chemistry:  Recent Labs     21  02521  0833 21  0526    138 135   K 4.2 4.1 4.3    101 99   CO2 24 25 24   GLUCOSE 113* 117* 109*   BUN 15 13 16   CREATININE 0.79 0.71 0.78   ANIONGAP 13 12 12   LABGLOM >60 >60 >60   GFRAA >60 >60 >60   CALCIUM 8.6 8.3* 8.5*     Recent Labs     21  0252   PROT 6.2*   LABALBU 3.8   AST 16   ALT 7   ALKPHOS 64   BILITOT 0.28*       Lab Results   Component Value Date    INR 1.2 2021    PROTIME 14.8 (H) 2021 Lab Results   Component Value Date/Time    SPECIAL NOT REPORTED 06/19/2021 05:10 PM     Lab Results   Component Value Date/Time    CULTURE NO GROWTH 06/19/2021 05:10 PM       No results found for: POCPH, PHART, PH, POCPCO2, UEN3GMT, PCO2, POCPO2, PO2ART, PO2, POCHCO3, TKW5CLX, HCO3, NBEA, PBEA, BEART, BE, THGBART, THB, REL3JHI, SIPE7IPF, K3SMVHZK, O2SAT, FIO2    Radiology:    XR FEMUR LEFT (MIN 2 VIEWS)    Result Date: 11/19/2021  Left femoral neck fracture extending into the medial aspect of the greater trochanter with moderate varus angulation and some degree of overriding. Decreased bone density. Moderate atherosclerotic change noted. CT Head WO Contrast    Result Date: 11/19/2021  No acute intracranial abnormality. CT Cervical Spine WO Contrast    Result Date: 11/19/2021  No acute abnormality of the cervical spine. Cervical spine degenerative changes as detailed above. Possibly significant foraminal stenosis on the right at C3-4 and C5-6. CT HIP LEFT WO CONTRAST    Result Date: 11/19/2021  Left femoral neck fracture as described. Osteoporosis. XR HIP 2-3 VW W PELVIS LEFT    Result Date: 11/19/2021  Left femoral neck fracture extending into the medial aspect of the greater trochanter with moderate varus angulation and some degree of overriding. Decreased bone density. Moderate atherosclerotic change noted. All radiological studies reviewed                Code Status:  Full Code    Electronically signed by Greg Magallanes MD on 11/20/2021 at 11:52 AM     Copy sent to Dr. Michael Olivera MD    This note was created with the assistance of a speech-recognition program.  Although the intention is to generate a document that actually reflects the content of the visit, no guarantees can be provided that every mistake has been identified and corrected by editing. Note was updated later by me after  physical examination and  completion of the assessment.

## 2021-11-21 ENCOUNTER — ANESTHESIA EVENT (OUTPATIENT)
Dept: OPERATING ROOM | Age: 86
DRG: 522 | End: 2021-11-21
Payer: MEDICARE

## 2021-11-21 ENCOUNTER — APPOINTMENT (OUTPATIENT)
Dept: GENERAL RADIOLOGY | Age: 86
DRG: 522 | End: 2021-11-21
Payer: MEDICARE

## 2021-11-21 ENCOUNTER — ANESTHESIA (OUTPATIENT)
Dept: OPERATING ROOM | Age: 86
DRG: 522 | End: 2021-11-21
Payer: MEDICARE

## 2021-11-21 VITALS
SYSTOLIC BLOOD PRESSURE: 96 MMHG | TEMPERATURE: 82.2 F | OXYGEN SATURATION: 100 % | DIASTOLIC BLOOD PRESSURE: 51 MMHG | RESPIRATION RATE: 13 BRPM

## 2021-11-21 LAB
ABSOLUTE EOS #: 0.26 K/UL (ref 0–0.44)
ABSOLUTE IMMATURE GRANULOCYTE: 0.04 K/UL (ref 0–0.3)
ABSOLUTE LYMPH #: 0.76 K/UL (ref 1.1–3.7)
ABSOLUTE MONO #: 1.14 K/UL (ref 0.1–1.2)
ANION GAP SERPL CALCULATED.3IONS-SCNC: 11 MMOL/L (ref 9–17)
BASOPHILS # BLD: 0 % (ref 0–2)
BASOPHILS ABSOLUTE: 0.04 K/UL (ref 0–0.2)
BUN BLDV-MCNC: 14 MG/DL (ref 8–23)
BUN/CREAT BLD: 20 (ref 9–20)
CALCIUM SERPL-MCNC: 8.5 MG/DL (ref 8.6–10.4)
CHLORIDE BLD-SCNC: 99 MMOL/L (ref 98–107)
CO2: 25 MMOL/L (ref 20–31)
CREAT SERPL-MCNC: 0.71 MG/DL (ref 0.5–0.9)
DIFFERENTIAL TYPE: ABNORMAL
EOSINOPHILS RELATIVE PERCENT: 3 % (ref 1–4)
GFR AFRICAN AMERICAN: >60 ML/MIN
GFR NON-AFRICAN AMERICAN: >60 ML/MIN
GFR SERPL CREATININE-BSD FRML MDRD: ABNORMAL ML/MIN/{1.73_M2}
GFR SERPL CREATININE-BSD FRML MDRD: ABNORMAL ML/MIN/{1.73_M2}
GLUCOSE BLD-MCNC: 112 MG/DL (ref 70–99)
HCT VFR BLD CALC: 34.3 % (ref 36.3–47.1)
HEMOGLOBIN: 11.2 G/DL (ref 11.9–15.1)
IMMATURE GRANULOCYTES: 0 %
LYMPHOCYTES # BLD: 8 % (ref 24–43)
MCH RBC QN AUTO: 32 PG (ref 25.2–33.5)
MCHC RBC AUTO-ENTMCNC: 32.7 G/DL (ref 28.4–34.8)
MCV RBC AUTO: 98 FL (ref 82.6–102.9)
MONOCYTES # BLD: 12 % (ref 3–12)
NRBC AUTOMATED: 0 PER 100 WBC
PDW BLD-RTO: 14.3 % (ref 11.8–14.4)
PLATELET # BLD: 215 K/UL (ref 138–453)
PLATELET ESTIMATE: ABNORMAL
PMV BLD AUTO: 9.3 FL (ref 8.1–13.5)
POTASSIUM SERPL-SCNC: 3.9 MMOL/L (ref 3.7–5.3)
RBC # BLD: 3.5 M/UL (ref 3.95–5.11)
RBC # BLD: ABNORMAL 10*6/UL
SEG NEUTROPHILS: 77 % (ref 36–65)
SEGMENTED NEUTROPHILS ABSOLUTE COUNT: 7.04 K/UL (ref 1.5–8.1)
SODIUM BLD-SCNC: 135 MMOL/L (ref 135–144)
WBC # BLD: 9.3 K/UL (ref 3.5–11.3)
WBC # BLD: ABNORMAL 10*3/UL

## 2021-11-21 PROCEDURE — C1776 JOINT DEVICE (IMPLANTABLE): HCPCS | Performed by: ORTHOPAEDIC SURGERY

## 2021-11-21 PROCEDURE — 27236 TREAT THIGH FRACTURE: CPT | Performed by: ORTHOPAEDIC SURGERY

## 2021-11-21 PROCEDURE — 80048 BASIC METABOLIC PNL TOTAL CA: CPT

## 2021-11-21 PROCEDURE — 6360000002 HC RX W HCPCS: Performed by: ANESTHESIOLOGY

## 2021-11-21 PROCEDURE — 72170 X-RAY EXAM OF PELVIS: CPT

## 2021-11-21 PROCEDURE — 3700000001 HC ADD 15 MINUTES (ANESTHESIA): Performed by: ORTHOPAEDIC SURGERY

## 2021-11-21 PROCEDURE — 7100000001 HC PACU RECOVERY - ADDTL 15 MIN: Performed by: ORTHOPAEDIC SURGERY

## 2021-11-21 PROCEDURE — 2700000000 HC OXYGEN THERAPY PER DAY

## 2021-11-21 PROCEDURE — 2580000003 HC RX 258: Performed by: ORTHOPAEDIC SURGERY

## 2021-11-21 PROCEDURE — 6360000002 HC RX W HCPCS: Performed by: ORTHOPAEDIC SURGERY

## 2021-11-21 PROCEDURE — 85025 COMPLETE CBC W/AUTO DIFF WBC: CPT

## 2021-11-21 PROCEDURE — 3600000015 HC SURGERY LEVEL 5 ADDTL 15MIN: Performed by: ORTHOPAEDIC SURGERY

## 2021-11-21 PROCEDURE — 2500000003 HC RX 250 WO HCPCS: Performed by: ANESTHESIOLOGY

## 2021-11-21 PROCEDURE — 6360000002 HC RX W HCPCS: Performed by: HOSPITALIST

## 2021-11-21 PROCEDURE — 94761 N-INVAS EAR/PLS OXIMETRY MLT: CPT

## 2021-11-21 PROCEDURE — 6370000000 HC RX 637 (ALT 250 FOR IP): Performed by: ORTHOPAEDIC SURGERY

## 2021-11-21 PROCEDURE — 36415 COLL VENOUS BLD VENIPUNCTURE: CPT

## 2021-11-21 PROCEDURE — 7100000000 HC PACU RECOVERY - FIRST 15 MIN: Performed by: ORTHOPAEDIC SURGERY

## 2021-11-21 PROCEDURE — 2709999900 HC NON-CHARGEABLE SUPPLY: Performed by: ORTHOPAEDIC SURGERY

## 2021-11-21 PROCEDURE — 3700000000 HC ANESTHESIA ATTENDED CARE: Performed by: ORTHOPAEDIC SURGERY

## 2021-11-21 PROCEDURE — 0SRS0JA REPLACEMENT OF LEFT HIP JOINT, FEMORAL SURFACE WITH SYNTHETIC SUBSTITUTE, UNCEMENTED, OPEN APPROACH: ICD-10-PCS | Performed by: ORTHOPAEDIC SURGERY

## 2021-11-21 PROCEDURE — 3600000005 HC SURGERY LEVEL 5 BASE: Performed by: ORTHOPAEDIC SURGERY

## 2021-11-21 PROCEDURE — 1200000000 HC SEMI PRIVATE

## 2021-11-21 DEVICE — IMPLANTABLE DEVICE: Type: IMPLANTABLE DEVICE | Site: HIP | Status: FUNCTIONAL

## 2021-11-21 DEVICE — SHELL UPLR DIA50MM FEM HIP CO CHROM MOLYBDENUM ALLY MOD: Type: IMPLANTABLE DEVICE | Site: HIP | Status: FUNCTIONAL

## 2021-11-21 RX ORDER — ONDANSETRON 2 MG/ML
4 INJECTION INTRAMUSCULAR; INTRAVENOUS
Status: DISCONTINUED | OUTPATIENT
Start: 2021-11-21 | End: 2021-11-21 | Stop reason: HOSPADM

## 2021-11-21 RX ORDER — HYDROCODONE BITARTRATE AND ACETAMINOPHEN 5; 325 MG/1; MG/1
1 TABLET ORAL EVERY 6 HOURS PRN
Status: DISCONTINUED | OUTPATIENT
Start: 2021-11-21 | End: 2021-11-28 | Stop reason: HOSPADM

## 2021-11-21 RX ORDER — FENTANYL CITRATE 50 UG/ML
25 INJECTION, SOLUTION INTRAMUSCULAR; INTRAVENOUS EVERY 5 MIN PRN
Status: DISCONTINUED | OUTPATIENT
Start: 2021-11-21 | End: 2021-11-21 | Stop reason: HOSPADM

## 2021-11-21 RX ORDER — NEOSTIGMINE METHYLSULFATE 5 MG/5 ML
SYRINGE (ML) INTRAVENOUS PRN
Status: DISCONTINUED | OUTPATIENT
Start: 2021-11-21 | End: 2021-11-21 | Stop reason: SDUPTHER

## 2021-11-21 RX ORDER — PROPOFOL 10 MG/ML
INJECTION, EMULSION INTRAVENOUS PRN
Status: DISCONTINUED | OUTPATIENT
Start: 2021-11-21 | End: 2021-11-21 | Stop reason: SDUPTHER

## 2021-11-21 RX ORDER — CEFAZOLIN SODIUM 1 G/3ML
INJECTION, POWDER, FOR SOLUTION INTRAMUSCULAR; INTRAVENOUS PRN
Status: DISCONTINUED | OUTPATIENT
Start: 2021-11-21 | End: 2021-11-21 | Stop reason: SDUPTHER

## 2021-11-21 RX ORDER — ROCURONIUM BROMIDE 10 MG/ML
INJECTION, SOLUTION INTRAVENOUS PRN
Status: DISCONTINUED | OUTPATIENT
Start: 2021-11-21 | End: 2021-11-21 | Stop reason: SDUPTHER

## 2021-11-21 RX ORDER — GINSENG 100 MG
CAPSULE ORAL PRN
Status: DISCONTINUED | OUTPATIENT
Start: 2021-11-21 | End: 2021-11-21 | Stop reason: ALTCHOICE

## 2021-11-21 RX ORDER — ONDANSETRON 2 MG/ML
INJECTION INTRAMUSCULAR; INTRAVENOUS PRN
Status: DISCONTINUED | OUTPATIENT
Start: 2021-11-21 | End: 2021-11-21 | Stop reason: SDUPTHER

## 2021-11-21 RX ORDER — HYDROCODONE BITARTRATE AND ACETAMINOPHEN 5; 325 MG/1; MG/1
2 TABLET ORAL EVERY 6 HOURS PRN
Status: DISCONTINUED | OUTPATIENT
Start: 2021-11-21 | End: 2021-11-28 | Stop reason: HOSPADM

## 2021-11-21 RX ORDER — FENTANYL CITRATE 50 UG/ML
INJECTION, SOLUTION INTRAMUSCULAR; INTRAVENOUS PRN
Status: DISCONTINUED | OUTPATIENT
Start: 2021-11-21 | End: 2021-11-21 | Stop reason: SDUPTHER

## 2021-11-21 RX ORDER — MORPHINE SULFATE 2 MG/ML
2 INJECTION, SOLUTION INTRAMUSCULAR; INTRAVENOUS EVERY 4 HOURS PRN
Status: DISCONTINUED | OUTPATIENT
Start: 2021-11-21 | End: 2021-11-28

## 2021-11-21 RX ORDER — PROMETHAZINE HYDROCHLORIDE 25 MG/ML
6.25 INJECTION, SOLUTION INTRAMUSCULAR; INTRAVENOUS
Status: DISCONTINUED | OUTPATIENT
Start: 2021-11-21 | End: 2021-11-21 | Stop reason: HOSPADM

## 2021-11-21 RX ORDER — HYDROCODONE BITARTRATE AND ACETAMINOPHEN 5; 325 MG/1; MG/1
1 TABLET ORAL EVERY 6 HOURS PRN
Qty: 20 TABLET | Refills: 0 | Status: SHIPPED | OUTPATIENT
Start: 2021-11-21 | End: 2021-11-28 | Stop reason: HOSPADM

## 2021-11-21 RX ORDER — LIDOCAINE HYDROCHLORIDE 20 MG/ML
INJECTION, SOLUTION EPIDURAL; INFILTRATION; INTRACAUDAL; PERINEURAL PRN
Status: DISCONTINUED | OUTPATIENT
Start: 2021-11-21 | End: 2021-11-21 | Stop reason: SDUPTHER

## 2021-11-21 RX ORDER — GLYCOPYRROLATE 1 MG/5 ML
SYRINGE (ML) INTRAVENOUS PRN
Status: DISCONTINUED | OUTPATIENT
Start: 2021-11-21 | End: 2021-11-21 | Stop reason: SDUPTHER

## 2021-11-21 RX ORDER — FENTANYL CITRATE 50 UG/ML
50 INJECTION, SOLUTION INTRAMUSCULAR; INTRAVENOUS EVERY 5 MIN PRN
Status: DISCONTINUED | OUTPATIENT
Start: 2021-11-21 | End: 2021-11-21 | Stop reason: HOSPADM

## 2021-11-21 RX ORDER — PHENYLEPHRINE HCL IN 0.9% NACL 1 MG/10 ML
SYRINGE (ML) INTRAVENOUS PRN
Status: DISCONTINUED | OUTPATIENT
Start: 2021-11-21 | End: 2021-11-21 | Stop reason: SDUPTHER

## 2021-11-21 RX ORDER — DEXAMETHASONE SODIUM PHOSPHATE 10 MG/ML
INJECTION INTRAMUSCULAR; INTRAVENOUS PRN
Status: DISCONTINUED | OUTPATIENT
Start: 2021-11-21 | End: 2021-11-21 | Stop reason: SDUPTHER

## 2021-11-21 RX ORDER — TRANEXAMIC ACID 100 MG/ML
INJECTION, SOLUTION INTRAVENOUS PRN
Status: DISCONTINUED | OUTPATIENT
Start: 2021-11-21 | End: 2021-11-21 | Stop reason: SDUPTHER

## 2021-11-21 RX ORDER — HYDROCODONE BITARTRATE AND ACETAMINOPHEN 5; 325 MG/1; MG/1
1 TABLET ORAL PRN
Status: DISCONTINUED | OUTPATIENT
Start: 2021-11-21 | End: 2021-11-21 | Stop reason: HOSPADM

## 2021-11-21 RX ORDER — HYDROCODONE BITARTRATE AND ACETAMINOPHEN 5; 325 MG/1; MG/1
2 TABLET ORAL PRN
Status: DISCONTINUED | OUTPATIENT
Start: 2021-11-21 | End: 2021-11-21 | Stop reason: HOSPADM

## 2021-11-21 RX ADMIN — FENTANYL CITRATE 25 MCG: 0.05 INJECTION, SOLUTION INTRAMUSCULAR; INTRAVENOUS at 13:38

## 2021-11-21 RX ADMIN — DEXTROSE AND SODIUM CHLORIDE: 5; 450 INJECTION, SOLUTION INTRAVENOUS at 17:38

## 2021-11-21 RX ADMIN — DEXAMETHASONE SODIUM PHOSPHATE 10 MG: 10 INJECTION INTRAMUSCULAR; INTRAVENOUS at 11:18

## 2021-11-21 RX ADMIN — LIDOCAINE HYDROCHLORIDE 5 ML: 20 INJECTION, SOLUTION EPIDURAL; INFILTRATION; INTRACAUDAL; PERINEURAL at 11:00

## 2021-11-21 RX ADMIN — ROCURONIUM BROMIDE 40 MG: 10 INJECTION, SOLUTION INTRAVENOUS at 11:00

## 2021-11-21 RX ADMIN — Medication 5 MG: at 12:30

## 2021-11-21 RX ADMIN — MORPHINE SULFATE 2 MG: 2 INJECTION, SOLUTION INTRAMUSCULAR; INTRAVENOUS at 15:08

## 2021-11-21 RX ADMIN — HYDROCODONE BITARTRATE AND ACETAMINOPHEN 1 TABLET: 5; 325 TABLET ORAL at 21:33

## 2021-11-21 RX ADMIN — FENTANYL CITRATE 100 MCG: 50 INJECTION, SOLUTION INTRAMUSCULAR; INTRAVENOUS at 11:00

## 2021-11-21 RX ADMIN — Medication 100 MCG: at 11:14

## 2021-11-21 RX ADMIN — Medication 0.4 MG: at 12:30

## 2021-11-21 RX ADMIN — CEFAZOLIN SODIUM 2000 MG: 10 INJECTION, POWDER, FOR SOLUTION INTRAVENOUS at 15:09

## 2021-11-21 RX ADMIN — FENTANYL CITRATE 25 MCG: 0.05 INJECTION, SOLUTION INTRAMUSCULAR; INTRAVENOUS at 13:43

## 2021-11-21 RX ADMIN — CEFAZOLIN 2000 MG: 1 INJECTION, POWDER, FOR SOLUTION INTRAMUSCULAR; INTRAVENOUS at 11:19

## 2021-11-21 RX ADMIN — FENTANYL CITRATE 50 MCG: 50 INJECTION, SOLUTION INTRAMUSCULAR; INTRAVENOUS at 11:23

## 2021-11-21 RX ADMIN — FENTANYL CITRATE 25 MCG: 0.05 INJECTION, SOLUTION INTRAMUSCULAR; INTRAVENOUS at 04:41

## 2021-11-21 RX ADMIN — FENTANYL CITRATE 50 MCG: 50 INJECTION, SOLUTION INTRAMUSCULAR; INTRAVENOUS at 11:28

## 2021-11-21 RX ADMIN — PROPOFOL 100 MG: 10 INJECTION, EMULSION INTRAVENOUS at 11:00

## 2021-11-21 RX ADMIN — TRANEXAMIC ACID 500 MG: 100 INJECTION, SOLUTION INTRAVENOUS at 11:19

## 2021-11-21 RX ADMIN — FENTANYL CITRATE 50 MCG: 50 INJECTION, SOLUTION INTRAMUSCULAR; INTRAVENOUS at 11:29

## 2021-11-21 RX ADMIN — CEFAZOLIN SODIUM 2000 MG: 10 INJECTION, POWDER, FOR SOLUTION INTRAVENOUS at 23:16

## 2021-11-21 RX ADMIN — ONDANSETRON 4 MG: 2 INJECTION, SOLUTION INTRAMUSCULAR; INTRAVENOUS at 12:28

## 2021-11-21 ASSESSMENT — PULMONARY FUNCTION TESTS
PIF_VALUE: 10
PIF_VALUE: 15
PIF_VALUE: 18
PIF_VALUE: 16
PIF_VALUE: 15
PIF_VALUE: 15
PIF_VALUE: 14
PIF_VALUE: 12
PIF_VALUE: 15
PIF_VALUE: 14
PIF_VALUE: 10
PIF_VALUE: 16
PIF_VALUE: 1
PIF_VALUE: 10
PIF_VALUE: 15
PIF_VALUE: 15
PIF_VALUE: 17
PIF_VALUE: 12
PIF_VALUE: 15
PIF_VALUE: 15
PIF_VALUE: 14
PIF_VALUE: 12
PIF_VALUE: 12
PIF_VALUE: 17
PIF_VALUE: 14
PIF_VALUE: 12
PIF_VALUE: 15
PIF_VALUE: 20
PIF_VALUE: 11
PIF_VALUE: 15
PIF_VALUE: 11
PIF_VALUE: 12
PIF_VALUE: 20
PIF_VALUE: 14
PIF_VALUE: 10
PIF_VALUE: 15
PIF_VALUE: 15
PIF_VALUE: 12
PIF_VALUE: 14
PIF_VALUE: 15
PIF_VALUE: 17
PIF_VALUE: 19
PIF_VALUE: 11
PIF_VALUE: 11
PIF_VALUE: 15
PIF_VALUE: 10
PIF_VALUE: 15
PIF_VALUE: 16
PIF_VALUE: 25
PIF_VALUE: 17
PIF_VALUE: 10
PIF_VALUE: 10
PIF_VALUE: 15
PIF_VALUE: 13
PIF_VALUE: 15
PIF_VALUE: 15
PIF_VALUE: 11
PIF_VALUE: 16
PIF_VALUE: 11
PIF_VALUE: 14
PIF_VALUE: 17
PIF_VALUE: 18
PIF_VALUE: 15
PIF_VALUE: 11
PIF_VALUE: 12
PIF_VALUE: 15
PIF_VALUE: 11
PIF_VALUE: 10
PIF_VALUE: 15
PIF_VALUE: 10
PIF_VALUE: 15
PIF_VALUE: 11
PIF_VALUE: 15
PIF_VALUE: 11
PIF_VALUE: 14
PIF_VALUE: 15
PIF_VALUE: 12
PIF_VALUE: 12
PIF_VALUE: 15
PIF_VALUE: 12
PIF_VALUE: 14
PIF_VALUE: 11
PIF_VALUE: 15
PIF_VALUE: 15
PIF_VALUE: 14
PIF_VALUE: 11
PIF_VALUE: 16
PIF_VALUE: 11
PIF_VALUE: 16
PIF_VALUE: 16
PIF_VALUE: 15
PIF_VALUE: 12
PIF_VALUE: 11
PIF_VALUE: 14
PIF_VALUE: 14
PIF_VALUE: 11
PIF_VALUE: 15
PIF_VALUE: 14
PIF_VALUE: 15
PIF_VALUE: 14
PIF_VALUE: 15
PIF_VALUE: 16
PIF_VALUE: 10
PIF_VALUE: 10
PIF_VALUE: 15
PIF_VALUE: 12
PIF_VALUE: 15

## 2021-11-21 ASSESSMENT — PAIN - FUNCTIONAL ASSESSMENT
PAIN_FUNCTIONAL_ASSESSMENT: PREVENTS OR INTERFERES SOME ACTIVE ACTIVITIES AND ADLS
PAIN_FUNCTIONAL_ASSESSMENT: PREVENTS OR INTERFERES SOME ACTIVE ACTIVITIES AND ADLS

## 2021-11-21 ASSESSMENT — ENCOUNTER SYMPTOMS: SHORTNESS OF BREATH: 0

## 2021-11-21 ASSESSMENT — PAIN DESCRIPTION - FREQUENCY
FREQUENCY: INTERMITTENT
FREQUENCY: OTHER (COMMENT)

## 2021-11-21 ASSESSMENT — PAIN SCALES - GENERAL
PAINLEVEL_OUTOF10: 6
PAINLEVEL_OUTOF10: 6
PAINLEVEL_OUTOF10: 0
PAINLEVEL_OUTOF10: 3
PAINLEVEL_OUTOF10: 0
PAINLEVEL_OUTOF10: 7

## 2021-11-21 ASSESSMENT — PAIN DESCRIPTION - LOCATION
LOCATION: HIP
LOCATION: HIP

## 2021-11-21 ASSESSMENT — PAIN DESCRIPTION - DESCRIPTORS
DESCRIPTORS: ACHING
DESCRIPTORS: ACHING

## 2021-11-21 ASSESSMENT — PAIN DESCRIPTION - ORIENTATION
ORIENTATION: LEFT
ORIENTATION: LEFT

## 2021-11-21 ASSESSMENT — PAIN DESCRIPTION - PAIN TYPE
TYPE: ACUTE PAIN
TYPE: ACUTE PAIN

## 2021-11-21 NOTE — OP NOTE
[place] [id number]  Dept: [phone]: [phone]      Orthopedic Surgery Operative Report      Patient: Mario Leyva      MRN#: [id number]     YOB: 1935      Date of Surgery: 11/19/2021    Attending Surgeon: Omar Garcia M.D.   PCP: Chilo Bower MD        Preoperative Diagnosis:   1. Left closed displaced femoral neck fracture  2. Dementia  3. Vitamin D deficiency  4. Body mass index is 17.59 kg/m². Postoperative Diagnosis:   1. same    Procedures Performed:  (11/21/2021)  1. Open treatment of Left displaced femoral neck fracture with prosthetic replacement      Implants:    Bailey press-fit echo hemiarthroplasty (size 10 stem, +6 offset neck, 50 mm head)  Implant Name Type Inv. Item Serial No.  Lot No. LRB No. Used Action   STEM FEM [id number] STD OFFSET HIP TI GRIT BLASTED CRV  STEM FEM [id number] STD OFFSET HIP TI GRIT BLASTED CRV  [employer]- [id number] Left 1 Implanted   INSERT FEM NK +6MM HIP CO CHROM MOLYBDENUM ALLY TAPR 1 ENDO  INSERT FEM NK +6MM HIP CO CHROM MOLYBDENUM ALLY TAPR 1 ENDO  [employer]-WD [id number] Left 1 Implanted   SHELL UPLR [id number] FEM HIP CO CHROM MOLYBDENUM ALLY MOD  SHELL UPLR [id number] FEM HIP CO CHROM MOLYBDENUM ALLY MOD  [employer]- [id number] Left 1 Implanted         Attending Surgeon:     Ashley Hess MD      Assistant Surgeon:    Resident: Kareem Valentino DO      Anesthesia:    General      Staff:  Surgeon(s):  Marisel Simons MD  Scrub Person First: Maureen Dixon  Anesthesia: Stella Richardson MD      Estimated Blood Loss:    Less than 100 ml  * No values recorded between 11/21/2021 10:55 AM and 11/21/2021 12:37 PM * mL      Complications:    None      Specimen:    * No specimens in log *      History:    Ms. Mario Leyva is a 81 y.o. female who was seen recently for the above problem.      She has a history of a left hip displaced femoral neck fracture, sustained on 2021.      Notably, she has the past medical history as above. She has a history of vitamin D deficiency, and dementia (lives in a nursing home, unable to answer questions). She is currently medically stable and appropriate for the planned procedure. We have spoken about the risks/benefits/alternatives to a surgical intervention, verbal understanding of these risks was expressed, and informed consent was obtained. All questions were answered. No guarantees were made or implied. I have answered all questions, and they wish to proceed with surgical intervention. Operative Note:    The patient was identified in the preoperative holding area by name, MRN, and . The surgical site was verified and marked according to AAOS guidelines. The patient was taken to the operating room on a stretcher. The patient underwent smooth induction of anesthesia. After achieving adequate sedation by the anesthesia team, the patient was then carefully transferred to the operating table, and carefully positioned in the lateral decubitus position with the operative side up. All bony prominences were well-padded and an axillary roll was placed. A pegboard was used to secure the patient in the lateral decubitus position. The patient's operative lower extremity was then prepped and draped in the usual sterile manner. A timeout was held in which the patient's identity, surgical site, procedure, allergies, and antibiotics were verified, and all in the room were in agreement, and thus the procedure began. An incision was marked out over the posterior third of the greater trochanter, in line with the axis of the femur. The skin was incised sharply, and vessels were cauterized. Sharp incision was carried down to the IT band fascia. At this point hemostasis was achieved using electrocautery. A new 10 blade was used to incise the IT band fascia.  The greater trochanter was palpated prior to fascial incision to ensure that the incision was centered over the trochanter. The IT band fascial incision was carried distally and proximally using Metzenbaum scissors, taking care to preserve the underlying fascia of the vastus lateralis. Next, the gluteus juanita muscle fibers were split proximally with electrocautery. A small plane was developed anteriorly and posteriorly for placement of the Charnley retractor. Next, the bursa was released from the posterior edge of the greater trochanter using the Bovie. An abductor retractor was placed under the gluteus medius tendon edge, which was retracted anteriorly. The piriformis tendon and short external rotators were then visualized. Using electrocautery, the piriformis tendon and short external rotators were detached directly off of bone. The tendons were tagged with nonabsorbable suture for later repair. Cristhian Dane was used to retract the gluteus minimus and expose the posterior hip capsule. The extremity was internally rotated at this point to improve exposure. A capsulotomy was performed using electrocautery along the posterior edge of the trochanter. It was elevated as one sleeve with capsule and remaining external rotators off the bone to the superior border of the quadratus muscle. At this point, we were able to palpate the lesser trochanter. The neck cut was measured from the top of the lesser trochanter. The intended osteotomy was marked with electrocautery at a 45 degree angle. The neck cut was made with an oscillating saw, taking care not to injure the greater trochanter. At this point, the fractured femoral head was visible inside the acetabulum. A corkscrew was used to engage the femoral head, which was removed with gentle pressure. The head diameter was measured on the back table and found to be 49 mm. A good suction fit was obtained using the same size trial monopolar head.  A box osteotome was used to remove proximal bone and make an entry point for the canal finder. The canal finder was inserted directly into the intramedullary canal of the proximal femur. The lateralizing reamer was used, as well as a canal finder. Care was taken to adequately lateralize in order to prevent varus positioning of the implant. Sequential broaches were then used, beginning with a size 7 broach. The broaches were inserted at approximately 15 degrees of anteversion. During sequential broaching, care was taken not to injure the greater trochanter. Broaching was performed up to a size 10 broach, which displayed a good fit and rotational stability. At this point, a trial reduction was performed. The leg lengths were checked and found to be equivalent. Stability was checked with the hip flexed to 90 degrees, and the hip internally rotated. A shuck test was performed. The construct was found to have appropriate stability in all positions. Next, the entire incision and intramedullary canal of the proximal femur was irrigated with normal saline. A size 10 Jannet Echo Press-Fit Interlok stem with +6 neck was inserted into the proximal femur. The stem was carefully seated in place with a mallet. The trunnion was irrigated, and the final implant consisting of a 10 mm unipolar head was gently impacted onto the stem. Final radiographs were obtained and reviewed, showing the implants were positioned safely and appropriately. The hip was reduced. Leg lengths and stability were again checked and found to be appropriate (90 degrees of flexion and approximately 80 degrees of internal rotation). The entire incision was irrigated thoroughly with copious sterile irrigation. The capsule and external rotators were repaired back to the posterior trochanter using drill holes. The repair was performed and the sutures were tied with the leg externally rotated. The IT fascia was closed with #2 Vicryl figure-of-eight interrupted sutures.  The deep subcutaneous layer was closed with interrupted #1 Vicryl sutures. The subcutaneous tissue was closed with 2-0 Vicryl interrupted sutures, and the skin was closed with staples, providing appropriate tension of the skin. The skin was cleaned and gently dried. An Aquacel dressing was then applied to the incision. An abduction pillow was placed between the legs and the patient. The patient was aroused from anesthesia without complication, and gently transferred to the bed, and then transported to the postoperative area in a stable condition. The patient was noted to have tolerated the procedure well without complication.       Postoperative Plan:         Precautions: Weightbearing as tolerated with posterior hip precautions to the Left hip x3 months   -Posterior hip precautions:  avoid flexion and internal rotation of hip, do not cross leg over other, avoid bending at the waist past 90 degrees              []  Physical Therapy/Home exercises       Immobilization:      []  Splint/Cast  []  CAM boot  []  Comfortable shoe    [x]  Other:  Abduction pillow at all times when non-ambulatory x3 months     DVT ppx:   [x]  Early mobilization       [x]  Medication as prescribed (Lovenox)      []  No chemical ppx needed; mechanical only   -    Pain control:  Medication as prescribed (dosing and quantity) indicated for acute postoperative pain control   -    Special concerns:       []        [x]  Avoid strenuous activity/pain provoking maneuvers and high-impact repetitive exercises    -    Disposition:   PACU               Romana Sicard, MD  Orthopedic Surgery

## 2021-11-21 NOTE — PROGRESS NOTES
Pt has purewick in place, however brief is very wet on bottom of pt. Not all urine being collected by purewick.

## 2021-11-21 NOTE — PLAN OF CARE
Problem: Falls - Risk of:  Goal: Will remain free from falls  Description: Will remain free from falls  11/20/2021 2010 by Jj Pulido RN  Outcome: Ongoing     Problem: Falls - Risk of:  Goal: Absence of physical injury  Description: Absence of physical injury  11/20/2021 2010 by Jj Pulido RN  Outcome: Ongoing     Patient is a fall risk during this admission. Fall risk assessment was performed. Patient is absent of falls. Bed is in the lowest position. Wheels on the bed are locked. Call light and bed side table are within reach. Clutter is removed. Patient was educated to call out when needing assistance or wanting to get out of bed. Patient offered toileting assistance during rounding. Hourly rounds have been performed. Yes

## 2021-11-21 NOTE — PROGRESS NOTES
[place]  [address] [facility] [address]  Dept: [phone]  Loc: [phone]    Inpatient Orthopedic Consult      CHIEF COMPLAINT:    left hip pain    HISTORY OF PRESENT ILLNESS:      The patient is a 81 y.o. female who is being seen as an inpatient for consultation and evaluation of the above complaint which occurred secondary to a fall from standing height. The patient experienced immediate pain at the hip and difficulty with ambulation/weight bearing. The patient was brought to the emergency department where imaging revealed a hip fracture, and Orthopaedics was consulted for evaluation and definitive treatment. The patient localizes the pain to the above hip. Prior to this, the patient used a walker for ambulation. History is obtained today from:   [x]  the patient     [x]  EMR     [x]  one family member/friend    []  multiple family members/friends    []  other:   ER doctor, ER nurse    INTERVAL HISTORY 11/21/21:    She is seen and examined again today in the hospital. No significant changes. The operative site was identified, verified and marked. The procedure was verified. I reviewed the risks, benefits, and alternatives to the procedure. I have also reviewed the history and physical. There are no significant changes in medical history. All questions were answered and they wish to proceed as planned. I spoke with the patient's son/POA who is bedside.        REVIEW OF SYSTEMS:  Musculoskeletal: See HPI for pertinent positives     Past Medical History:    Past Medical History:   Diagnosis Date    Dementia ([address]     Hypertension        Past Surgical History:    Past Surgical History:   Procedure Laterality Date    APPENDECTOMY      TUBAL LIGATION         Current Medications:   Current Facility-Administered Medications   Medication Dose Route Frequency Provider Last Rate Last Admin    [MAR Hold] fentaNYL (SUBLIMAZE) injection 25 mcg  25 mcg IntraVENous Q5 Min PRN Tawny Noriega MD        [facility] Hold] fentaNYL (SUBLIMAZE) injection 50 mcg  50 mcg IntraVENous Q5 Min PRN Tawny Noriega MD        [facility] Hold] HYDROcodone-acetaminophen (NORCO) 5-325 MG per tablet 1 tablet  1 tablet Oral PRN Tawny Noriega MD        Or   Haley Ramandeep [facility] Hold] HYDROcodone-acetaminophen (NORCO) 5-325 MG per tablet 2 tablet  2 tablet Oral PRN Tawny Noriega MD        [facility] Hold] ondansetron [facility]) injection 4 mg  4 mg IntraVENous Once PRN Tawny Noriega MD        [facility] Hold] promethazine (PHENERGAN) injection 6.25 mg  6.25 mg IntraVENous Once PRN Tawny Noriega MD        bacitracin ointment    PRN Damari Salcedo MD   1 each at 11/21/21 1150    [MAR Hold] dextrose 5 % and 0.45 % sodium chloride infusion   IntraVENous Continuous Reji Her MD 50 mL/hr at 11/20/21 [address] at 11/20/21 1953    [MAR Hold] donepezil (ARICEPT) tablet 5 mg  5 mg Oral Nightly Ines Beachum, APRN - CNP   5 mg at 11/19/21 2116    [MAR Hold] citalopram (CELEXA) tablet 10 mg  10 mg Oral Daily Ines Beachum, APRN - CNP   10 mg at 11/20/21 0854    [MAR Hold] sodium chloride flush 0.9 % injection 10 mL  10 mL IntraVENous 2 times per day Majo Pop, APRN - CNP   10 mL at 11/20/21 1958    [MAR Hold] sodium chloride flush 0.9 % injection 10 mL  10 mL IntraVENous PRN Ines Beachum, APRN - CNP        [MAR Hold] 0.9 % sodium chloride infusion  25 mL IntraVENous PRN Ines Beachum, APRN - CNP        [MAR Hold] ondansetron (ZOFRAN-ODT) disintegrating tablet 4 mg  4 mg Oral Q8H PRN Ines Beachum, APRN - CNP        Or    [MAR Hold] ondansetron (ZOFRAN) injection 4 mg  4 mg IntraVENous Q6H PRN Ines Beachum, APRN - CNP        [MAR Hold] magnesium hydroxide (MILK OF MAGNESIA) 400 MG/5ML suspension 30 mL  30 mL Oral Daily PRN Ines Aparicio, ROSE - CNP        [MAR Hold] acetaminophen (TYLENOL) tablet 650 mg  650 mg Oral Q6H PRN Ines [facility], ROSE - CNP   650 mg at 11/19/21 0547    Or    [MAR Hold] acetaminophen (TYLENOL) suppository 650 mg  650 mg Rectal Q6H PRN Ines Beachum, APRN - CNP        [MAR Hold] potassium chloride (KLOR-CON M) extended release tablet 40 mEq  40 mEq Oral PRN [facility], APRN - CNP        Or    [MAR Hold] potassium bicarb-citric acid (EFFER-K) effervescent tablet 40 mEq  40 mEq Oral PRN [facility], APRN - CNP        Or    [MAR Hold] potassium chloride 10 mEq/100 mL IVPB (Peripheral Line)  10 mEq IntraVENous PRN Ines Beachum, APRN - CNP        [MAR Hold] enoxaparin (LOVENOX) injection 40 mg  40 mg SubCUTAneous Daily [facility], APRN - CNP   40 mg at 11/20/21 0854    [MAR Hold] fentaNYL (SUBLIMAZE) injection 25 mcg  25 mcg IntraVENous Q6H PRN July Colbert MD   25 mcg at 11/21/21 0441    [MAR Hold] HYDROcodone-acetaminophen (NORCO) 5-325 MG per tablet 1 tablet  1 tablet Oral Q4H PRN July Colbert MD   1 tablet at 11/20/21 1316     Facility-Administered Medications Ordered in Other Encounters   Medication Dose Route Frequency Provider Last Rate Last Admin    phenylephrine (PAUL-SYNEPHRINE) 1 MG/10ML prefilled syringe   IntraVENous PRN Valente Mckinley MD   100 mcg at 11/21/21 1114    dexamethasone (DECADRON) injection   IntraVENous PRN Valente Mckinley MD   10 mg at 11/21/21 1118    ceFAZolin (ANCEF) injection   IntraVENous PRN Valente Mckinley MD   2,000 mg at 11/21/21 1119    tranexamic acid (CYKLOKAPRON) injection   IntraVENous PRN Valente Mckinley MD   500 mg at 11/21/21 1119    fentaNYL (SUBLIMAZE) injection   IntraVENous PRN Valente Mckinley MD   50 mcg at 11/21/21 1129    lidocaine PF 2 % injection   IntraVENous PRN Valente Mckinley MD   5 mL at 11/21/21 1100    propofol injection   IntraVENous PRN Valente Mckinley MD   100 mg at 11/21/21 1100    rocuronium (ZEMURON) injection   IntraVENous PRN Valente Mckinley MD   40 mg at 11/21/21 1100    ondansetron (ZOFRAN) injection   IntraVENous PRN Valente Mckinley MD   4 mg at 11/21/21 1228    glycopyrrolate (ROBINUL) injection   IntraVENous PRN Valente Mckinley MD   0.4 file    Emotionally Abused: Not on file    Physically Abused: Not on file    Sexually Abused: Not on file   Housing Stability:     Unable to Pay for Housing in the Last Year: Not on file    Number of Places Lived in the Last Year: Not on file    Unstable Housing in the Last Year: Not on file     Lives in a nursing home secondary to dementia     OBJECTIVE:  BP (!) 164/87   Pulse 87   Temp 97.8 °F (36.6 °C) (Axillary)   Resp 18   Ht 5' 6\" (1.676 m)   Wt 109 lb (49.4 kg)   SpO2 92%   BMI 17.59 kg/m²    Psych: Awake, alert. Does not answer questions, responds to noxious stimuli. Cardio:  well perfused extremities  Resp:  normal respiratory effort    Affected left lower extremity:    Vascular: Limb well perfused, compartments soft/compressible. Skin: No erythema/ulcers. Intact over the lateral aspect of the hip. Neurovascular Status: Unable to adequately assess  Motion: Unable to adequately assess  Tenderness to Palpation: Hip diffusely  -Limb shortened and rotated      Secondary survey exam:  No gross deformity, erythema, or significant tenderness of other bony prominences/joints   No tenderness to palpation over other bony prominences/joints of the bilateral upper and lower extremities   Log roll test negative on contralateral hip  Grossly neurovascularly intact bilateral upper extremity without focal deficit      RADIOLOGY:   Radiographic images and reports reviewed. Displaced femoral neck fracture of the left hip. XR FEMUR LEFT (MIN 2 VIEWS)    Result Date: 11/19/2021  EXAMINATION: ONE XRAY VIEW OF THE PELVIS AND TWO XRAY VIEWS LEFT HIP; 2 XRAY VIEWS OF THE LEFT FEMUR 11/19/2021 2:44 am COMPARISON: None.  HISTORY: ORDERING SYSTEM PROVIDED HISTORY: fall from standing TECHNOLOGIST PROVIDED HISTORY: fall from standing Reason for Exam: fall from standing Acuity: Acute Type of Exam: Initial FINDINGS: There is a fracture at left femoral neck extending into the medial aspect of the greater trochanter. There is resulting moderate varus angulation and some degree of overriding. The pelvis is intact as is the visualized proximal right femur. Soft tissues and visualized pelvic contents are noted for moderate atherosclerotic calcification. The remainder of the left femur is intact. Bone density is diffusely decreased. No significant joint abnormalities are evident. Left femoral neck fracture extending into the medial aspect of the greater trochanter with moderate varus angulation and some degree of overriding. Decreased bone density. Moderate atherosclerotic change noted. CT Head WO Contrast    Result Date: 11/19/2021  EXAMINATION: CT OF THE HEAD WITHOUT CONTRAST  11/19/2021 2:12 am TECHNIQUE: CT of the head was performed without the administration of intravenous contrast. Dose modulation, iterative reconstruction, and/or weight based adjustment of the mA/kV was utilized to reduce the radiation dose to as low as reasonably achievable. COMPARISON: 06/19/2021 HISTORY: ORDERING SYSTEM PROVIDED HISTORY: fall TECHNOLOGIST PROVIDED HISTORY: fall Decision Support Exception - unselect if not a suspected or confirmed emergency medical condition->Emergency Medical Condition (MA) Reason for Exam: Head and neck pain s/p fall Acuity: Acute Type of Exam: Initial FINDINGS: BRAIN/VENTRICLES: There is no acute intracranial hemorrhage, mass effect or midline shift. No abnormal extra-axial fluid collection. The gray-white differentiation is maintained without evidence of an acute infarct. There is no evidence of hydrocephalus. Mild diffuse cerebral atrophy and chronic white matter ischemic change. ORBITS: The visualized portion of the orbits demonstrate no acute abnormality. SINUSES: The visualized paranasal sinuses and mastoid air cells demonstrate no acute abnormality. SOFT TISSUES/SKULL:  No acute abnormality of the visualized skull or soft tissues. No acute intracranial abnormality.      CT Cervical Spine WO Contrast    Result Date: 11/19/2021  EXAMINATION: CT OF THE CERVICAL SPINE WITHOUT CONTRAST 11/19/2021 2:12 am TECHNIQUE: CT of the cervical spine was performed without the administration of intravenous contrast. Multiplanar reformatted images are provided for review. Dose modulation, iterative reconstruction, and/or weight based adjustment of the mA/kV was utilized to reduce the radiation dose to as low as reasonably achievable. COMPARISON: None. HISTORY: ORDERING SYSTEM PROVIDED HISTORY: fall TECHNOLOGIST PROVIDED HISTORY: fall Decision Support Exception - unselect if not a suspected or confirmed emergency medical condition->Emergency Medical Condition (MA) Reason for Exam: Head and neck pain s/p fall Acuity: Acute Type of Exam: Initial FINDINGS: BONES/ALIGNMENT: There is no acute fracture or traumatic malalignment. Mild grade 1 degenerative anterolisthesis of C4 on C5 is noted. DEGENERATIVE CHANGES: Diffuse variable mild to moderate cervical spine degenerative changes are present most pronounced at C5-6 and C6-7. There is possibly significant foraminal stenosis on the right at C3-4 and C5-6. SOFT TISSUES: There is no prevertebral soft tissue swelling. No acute abnormality of the cervical spine. Cervical spine degenerative changes as detailed above. Possibly significant foraminal stenosis on the right at C3-4 and C5-6. CT HIP LEFT WO CONTRAST    Result Date: 11/19/2021  EXAMINATION: CT OF THE LEFT HIP WITHOUT CONTRAST 11/19/2021 3:37 am TECHNIQUE: CT of the left hip was performed without the administration of intravenous contrast.  Multiplanar reformatted images are provided for review. Dose modulation, iterative reconstruction, and/or weight based adjustment of the mA/kV was utilized to reduce the radiation dose to as low as reasonably achievable. COMPARISON: Pelvis and left hip plain film series obtained approximately 1 hour earlier.  HISTORY ORDERING SYSTEM PROVIDED HISTORY: fem neck fx TECHNOLOGIST PROVIDED HISTORY: fem neck fx Decision Support Exception - unselect if not a suspected or confirmed emergency medical condition->Emergency Medical Condition (MA) Reason for Exam: fem neck fx Acuity: Acute Type of Exam: Initial FINDINGS: Bones: There is mildly comminuted fracture at left femoral neck with approximately 1 cm posterior displacement and 2-3 cm overriding. There is moderate varus angulation. Bone density is diffusely decreased. No other fracture is identified. Soft Tissue: Mild surrounding soft tissue swelling lateral to the left hip. Joint: No significant joint abnormality identified. Left femoral neck fracture as described. Osteoporosis. XR HIP 2-3 VW W PELVIS LEFT    Result Date: 11/19/2021  EXAMINATION: ONE XRAY VIEW OF THE PELVIS AND TWO XRAY VIEWS LEFT HIP; 2 XRAY VIEWS OF THE LEFT FEMUR 11/19/2021 2:44 am COMPARISON: None. HISTORY: ORDERING SYSTEM PROVIDED HISTORY: fall from standing TECHNOLOGIST PROVIDED HISTORY: fall from standing Reason for Exam: fall from standing Acuity: Acute Type of Exam: Initial FINDINGS: There is a fracture at left femoral neck extending into the medial aspect of the greater trochanter. There is resulting moderate varus angulation and some degree of overriding. The pelvis is intact as is the visualized proximal right femur. Soft tissues and visualized pelvic contents are noted for moderate atherosclerotic calcification. The remainder of the left femur is intact. Bone density is diffusely decreased. No significant joint abnormalities are evident. Left femoral neck fracture extending into the medial aspect of the greater trochanter with moderate varus angulation and some degree of overriding. Decreased bone density. Moderate atherosclerotic change noted.          LABS:   Lab Results   Component Value Date    WBC 9.3 11/21/2021    HGB 11.2 (L) 11/21/2021    HCT 34.3 (L) 11/21/2021    MCV 98.0 11/21/2021     11/21/2021     Lab Results Component Value Date     11/21/2021    K 3.9 11/21/2021    CL 99 11/21/2021    CO2 25 11/21/2021    BUN 14 11/21/2021    CREATININE 0.71 11/21/2021    GLUCOSE 112 11/21/2021    CALCIUM 8.5 11/21/2021          ASSESSMENT AND PLAN:  Body mass index is 17.59 kg/m². She has a history of a left hip displaced femoral neck fracture, sustained on 11/19/2021. Notably, she has the past medical history as above. She has a history of vitamin D deficiency, and dementia (lives in a nursing home, unable to answer questions). We had a discussion today about the likely diagnosis and its natural history, physical exam and imaging findings, as well as various treatment options in detail. Surgically, we discussed a hip hemiarthroplasty. We discussed the risks of wound healing complication/infection, recurrent dislocation, possible future surgery, limb length discrepancy, muscle weakness, intraoperative fracture, bleeding/blood clots, malpositioning of the component, as well as the risks below. We again discussed the risks of recurrent dislocation, specifically in the context of her dementia and potential inability to follow postoperative precautions, as well as the risks of future surgery. The patient's son, Zack/power of , was at bedside, and we had another good discussion regarding treatment options and risks. We have discussed that the risks of nonoperative treatment include pneumonia, urinary tract infection, skin breakdown/ulcer, and DVT/PE. We discussed that surgical intervention will hopefully increase the patient's function, allow early mobilization, and decrease but not prevent the above risks, as soon as it is safe medically to proceed, if they wish to proceed with surgery. We discussed the expected postoperative course, including the relevant restrictions.   As a result of our discussion including risks/benefits/alternatives, they are able to make an informed decision, and have elected to proceed with surgical management. We spoke about the risks/benefits/alternatives to a surgical intervention. They understand that the risks of surgery may include but are not limited to pain, infection, bleeding, blood clot, damage to soft tissue/vessel/nerve, future surgery, scarring/stiffness, decreased strength/weakness, cosmetic deformity, neuroma/neuritis/phantom pains, delayed soft tissue/bone healing, nonunion, malunion, failure of hardware/fixation/surgery, iatrogenic fracture/dislocation, damage to bone/joint(s), worsening of condition, recurrence, limb length discrepancy, avascular necrosis of bone, recurrent dislocation, neurovascular compromise/compartment syndrome, tourniquet complications, failure of surgery, dissatisfaction with outcome, loss of limb, stroke, heart attack, pulmonary embolus, mental status change, anesthesia risks/reaction, and even death. They expressed verbal understanding of the risks and wish to proceed with surgical intervention. All questions were answered. No guarantees were made or implied. Informed consent was obtained from the patient's power of , her son, Nael Alva. -DVT prophylaxis  -pain control  -Medical optimization; cardiac clearance obtained  -Abduction pillow  -Type and cross for 2 units  -NPO   -The plan is to proceed with the following: Left hip hemiarthroplasty    All questions were answered and the patient agrees with the above plan. I will continue to follow the patient while she is in the hospital.       Lyndon Lomas MD  Orthopedic Surgery        Please excuse any typos/errors, as this note was created with the assistance of voice recognition software. While intending to generate a document that actually reflects the content of the visit, the document can still have some errors including those of syntax and sound-a-like substitutions which may escape proof reading. In such instances, actual meaning can be extrapolated by context.

## 2021-11-21 NOTE — ANESTHESIA PRE PROCEDURE
Department of Anesthesiology  Preprocedure Note       Name:  Sabino Golden   Age:  81 y.o.  :  1935                                          MRN:  [id number]         Date:  2021      Surgeon: Phong Charles):  Eloise Olson MD    Procedure: Procedure(s):  HIP HEMIARTHROPLASTY    Medications prior to admission:   Prior to Admission medications    Medication Sig Start Date End Date Taking?  Authorizing Provider   acetaminophen (TYLENOL) 500 MG tablet Take 1,000 mg by mouth every 8 hours 21  Yes Historical Provider, MD   citalopram (CELEXA) 10 MG tablet Take 10 mg by mouth daily    Historical Provider, MD   donepezil (ARICEPT) 5 MG tablet Take 5 mg by mouth nightly    Historical Provider, MD   cloNIDine (CATAPRES) 0.1 MG tablet Take 0.1 mg by mouth nightly as needed    Historical Provider, MD       Current medications:    Current Facility-Administered Medications   Medication Dose Route Frequency Provider Last Rate Last Admin    dextrose 5 % and 0.45 % sodium chloride infusion   IntraVENous Continuous Rocco Novoa MD 50 mL/hr at 21 New Bag at 21    donepezil (ARICEPT) tablet 5 mg  5 mg Oral Nightly Ines Beachum, APRN - CNP   5 mg at 21    citalopram (CELEXA) tablet 10 mg  10 mg Oral Daily Ines Beachum, APRN - CNP   10 mg at 21 0854    sodium chloride flush 0.9 % injection 10 mL  10 mL IntraVENous 2 times per day Alda Bound, APRN - CNP   10 mL at 21    sodium chloride flush 0.9 % injection 10 mL  10 mL IntraVENous PRN Ines Beachum, APRN - CNP        0.9 % sodium chloride infusion  25 mL IntraVENous PRN Ines Beachum, APRN - CNP        ondansetron (ZOFRAN-ODT) disintegrating tablet 4 mg  4 mg Oral Q8H PRN Ines Beachum, APRN - CNP        Or    ondansetron (ZOFRAN) injection 4 mg  4 mg IntraVENous Q6H PRN Ines Beachum, APRN - CNP        magnesium hydroxide (MILK OF MAGNESIA) 400 MG/5ML suspension 30 mL  30 mL Oral Daily PRN Lori Ludwig, APRN - CNP        acetaminophen (TYLENOL) tablet 650 mg  650 mg Oral Q6H PRN Lori Ludwig, APRN - CNP   650 mg at 11/19/21 0547    Or    acetaminophen (TYLENOL) suppository 650 mg  650 mg Rectal Q6H PRN Ines Beach, APRN - CNP        potassium chloride (KLOR-CON M) extended release tablet 40 mEq  40 mEq Oral PRN [facility], APRN - CNP        Or    potassium bicarb-citric acid (EFFER-K) effervescent tablet 40 mEq  40 mEq Oral PRN [facility], APRN - CNP        Or    potassium chloride 10 mEq/100 mL IVPB (Peripheral Line)  10 mEq IntraVENous PRN [facility], APRN - CNP        enoxaparin (LOVENOX) injection 40 mg  40 mg SubCUTAneous Daily [facility], APRN - CNP   40 mg at 11/20/21 0854    fentaNYL (SUBLIMAZE) injection 25 mcg  25 mcg IntraVENous Q6H PRN Fabian Frederick MD   25 mcg at 11/21/21 0441    HYDROcodone-acetaminophen (NORCO) 5-325 MG per tablet 1 tablet  1 tablet Oral Q4H PRN Fabian Frederick MD   1 tablet at 11/20/21 1316       Allergies:  No Known Allergies    Problem List:    Patient Active Problem List   Diagnosis Code    Altered mental status R41.82    Confusion with non-focal neuro exam R41.0    Essential hypertension I10    HLD (hyperlipidemia) E78.5    Vitamin D deficiency E55.9    Left displaced femoral neck fracture ([facility] [address] S72.002A       Past Medical History:        Diagnosis Date    Dementia ([facility] [address]     Hypertension        Past Surgical History:        Procedure Laterality Date    APPENDECTOMY      TUBAL LIGATION         Social History:    Social History     Tobacco Use    Smoking status: Never Smoker    Smokeless tobacco: Never Used   Substance Use Topics    Alcohol use:  Yes     Alcohol/week: 1.0 standard drink     Types: 1 Glasses of wine per week     Comment: States \"One glass of wine a day\", also drinks margaritas                                Counseling given: Not Answered      Vital Signs (Current):   Vitals:    11/20/21 1855 11/21/21 0004 11/21/21 0356 11/21/21 0441   BP: 138/85 (!) 146/82 (!) 171/81 (!) 140/75   Pulse: 83 94 90 87   Resp: 16 16 16 16   Temp: 98.8 °F (37.1 °C) 98.4 °F (36.9 °C) 98.4 °F (36.9 °C)    TempSrc: Axillary Axillary Axillary    SpO2:  94% 92%    Weight:       Height:                                                  BP Readings from Last 3 Encounters:   11/21/21 (!) 140/75   08/20/21 (!) 162/79   06/20/21 (!) 174/88       NPO Status: Time of last liquid consumption: 0318 (sips with meds)                        Time of last solid consumption: 1700                        Date of last liquid consumption: 11/20/21                        Date of last solid food consumption: 11/19/21    BMI:   Wt Readings from Last 3 Encounters:   11/19/21 109 lb (49.4 kg)   08/20/21 114 lb (51.7 kg)   06/18/21 113 lb 0.5 oz (51.3 kg)     Body mass index is 17.59 kg/m². CBC:   Lab Results   Component Value Date    WBC 9.3 11/21/2021    RBC 3.50 11/21/2021    HGB 11.2 11/21/2021    HCT 34.3 11/21/2021    MCV 98.0 11/21/2021    RDW 14.3 11/21/2021     11/21/2021       CMP:   Lab Results   Component Value Date     11/21/2021    K 3.9 11/21/2021    CL 99 11/21/2021    CO2 25 11/21/2021    BUN 14 11/21/2021    CREATININE 0.71 11/21/2021    GFRAA >60 11/21/2021    LABGLOM >60 11/21/2021    GLUCOSE 112 11/21/2021    PROT 6.2 11/19/2021    CALCIUM 8.5 11/21/2021    BILITOT 0.28 11/19/2021    ALKPHOS 64 11/19/2021    AST 16 11/19/2021    ALT 7 11/19/2021       POC Tests: No results for input(s): POCGLU, POCNA, POCK, POCCL, POCBUN, POCHEMO, POCHCT in the last 72 hours.     Coags:   Lab Results   Component Value Date    PROTIME 14.8 11/20/2021    INR 1.2 11/20/2021       HCG (If Applicable): No results found for: PREGTESTUR, PREGSERUM, HCG, HCGQUANT     ABGs: No results found for: PHART, PO2ART, KOP0HUA, KFG2XCH, BEART, Z2CRLIOH     Type & Screen (If Applicable):  No results found for: LABABO, LABRH    Drug/Infectious Status (If Applicable):  No results found for: HIV, HEPCAB    COVID-19 Screening (If Applicable):   Lab Results   Component Value Date    COVID19 Not Detected 11/19/2021           Anesthesia Evaluation    Airway: Mallampati: I  TM distance: >3 FB   Neck ROM: full  Mouth opening: > = 3 FB Dental:          Pulmonary:       (-) shortness of breath                           Cardiovascular:    (+) hypertension:,     (-)  angina                Neuro/Psych:               GI/Hepatic/Renal:             Endo/Other:                     Abdominal:             Vascular:           Other Findings:             Anesthesia Plan      general     ASA 3                                 Reggie Canavan, MD   11/21/2021

## 2021-11-21 NOTE — PROGRESS NOTES
Progress note  [facility]    Adult [facility]      Name: Matt Norwood  MRN: [id number]     [place]: [de-identified]  Room: 2001/2001-02    Admit Date: 11/19/2021  1:00 AM  PCP: Arvind Fletcher MD    Primary Problem  Active Problems:    Left displaced femoral neck fracture ([address]  Resolved Problems:    * No resolved hospital problems. *        Assesment:       · Left displaced femoral neck fracture  · Hypertension  · Depression  · Advanced Dementia  · Vitamin D deficiency           Plan:     · Admit to MedSurg telemetry  · O2 maintain oxygen saturation greater than 92%. · Pain control  · Orthopedic consult, Open treatment of Left displaced femoral neck fracture with prosthetic replacement on 11/21/21  · Got clearance from cardiology  · PT OT  · Continue to monitor/telemetry/CBC with differential daily/BMP daily  · DVT and GI prophylaxis. Continue medications as below      Scheduled Meds:   ceFAZolin  2,000 mg IntraVENous Q8H    donepezil  5 mg Oral Nightly    citalopram  10 mg Oral Daily    sodium chloride flush  10 mL IntraVENous 2 times per day    enoxaparin  40 mg SubCUTAneous Daily     Continuous Infusions:   dextrose 5 % and 0.45 % NaCl 50 mL/hr at 11/20/21 1953    sodium chloride 125 mL/hr at 11/21/21 1254     PRN Meds:  HYDROcodone 5 mg - acetaminophen, 1 tablet, Q6H PRN  HYDROcodone 5 mg - acetaminophen, 2 tablet, Q6H PRN  morphine, 2 mg, Q4H PRN  sodium chloride flush, 10 mL, PRN  sodium chloride, 25 mL, PRN  ondansetron, 4 mg, Q8H PRN   Or  ondansetron, 4 mg, Q6H PRN  magnesium hydroxide, 30 mL, Daily PRN  acetaminophen, 650 mg, Q6H PRN   Or  acetaminophen, 650 mg, Q6H PRN  potassium chloride, 40 mEq, PRN   Or  potassium alternative oral replacement, 40 mEq, PRN   Or  potassium chloride, 10 mEq, PRN        Chief Complaint:     Chief Complaint   Patient presents with    Fall     left thigh pain         History of Present Illness:      Patient seen and examined at bedside.   Patient is very confused and unable to provide meaningful history . Patient was sleeping. Sitter was present at the bedside. HPI:    Benjie Pope is a 81 y.o.  female who presents with Fall (left thigh pain)      67-year-old lady with past medical history of hypertension presented to ER after an unwitnessed fall at nursing home. Patient is complaining of left leg pain and complaining of severe pain. Patient historian    Hemoglobin was 10.8. BMP was unremarkable. Blood pressure was 157/86. CT head and cervical spine was negative. X-ray hip shows left femoral neck fracture extending into the medial aspect of the greater trochanter with moderate varus angulation and some degree of overriding. I have personally reviewed the past medical history, past surgical history, medications, social history, and family history, and summarized in the note. Review of Systems:     Limited review of systems      Past Medical History:     Past Medical History:   Diagnosis Date    Dementia ([facility] [address]     Hypertension         Past Surgical History:     Past Surgical History:   Procedure Laterality Date    APPENDECTOMY      TUBAL LIGATION          Medications Prior to Admission:       Prior to Admission medications    Medication Sig Start Date End Date Taking? Authorizing Provider   enoxaparin (LOVENOX) 40 MG/0.4ML injection Inject 0.4 mLs into the skin daily for 42 doses 11/21/21 1/2/22 Yes Gabriel Bartholomew MD   HYDROcodone-acetaminophen (NORCO) 5-325 MG per tablet Take 1 tablet by mouth every 6 hours as needed for Pain for up to 7 days.  Do not exceed 3000 mg of Acetaminophen in 24 hrs. 11/21/21 11/28/21 Yes Gabriel Bartholomew MD   acetaminophen (TYLENOL) 500 MG tablet Take 1,000 mg by mouth every 8 hours 8/1/21  Yes Historical Provider, MD   citalopram (CELEXA) 10 MG tablet Take 10 mg by mouth daily    Historical Provider, MD   donepezil (ARICEPT) 5 MG tablet Take 5 mg by mouth nightly    Historical Provider, MD cloNIDine (CATAPRES) 0.1 MG tablet Take 0.1 mg by mouth nightly as needed    Historical Provider, MD        Allergies:       Patient has no known allergies. Social History:     Tobacco:    reports that she has never smoked. She has never used smokeless tobacco.  Alcohol:      reports current alcohol use of about 1.0 standard drink of alcohol per week. Drug Use:  reports no history of drug use.     Family History:     Family History   Problem Relation Age of Onset    No Known Problems Mother     Heart Disease Father          Physical Exam:     Vitals:  /61   Pulse 76   Temp 97.6 °F (36.4 °C) (Axillary)   Resp 12   Ht 5' 6\" (1.676 m)   Wt 109 lb (49.4 kg)   SpO2 96%   BMI 17.59 kg/m²   Temp (24hrs), Av.9 °F (36.6 °C), Min:82.2 °F (27.9 °C), Max:98.8 °F (37.1 °C)          General appearance -sleeping  Mental status -confused  Head - normocephalic and atraumatic  Eyes - pupils equal and reactive, extraocular eye movements intact, conjunctiva clear  Ears - hearing appears to be intact  Nose - no drainage noted  Mouth - mucous membranes moist  Neck - supple, no carotid bruits, thyroid not palpable  Chest - clear to auscultation, normal effort  Heart - normal rate, regular rhythm, no murmur  Abdomen - soft, nontender, nondistended, bowel sounds present all four quadrants, no masses, hepatomegaly or splenomegaly  Neurological -unable to assess  Extremities - peripheral pulses palpable, no pedal edema or calf pain with palpation  Skin - no gross lesions, rashes, or induration noted        Data:     Labs:    Hematology:  Recent Labs     21  0252 21  0526 21  0457   WBC 10.7 11.6* 9.3   RBC 3.40* 3.52* 3.50*   HGB 10.8* 11.1* 11.2*   HCT 34.9* 34.7* 34.3*   .6 98.6 98.0   MCH 31.8 31.5 32.0   MCHC 30.9 32.0 32.7   RDW 14.4 14.2 14.3    221 215   MPV 9.4 9.2 9.3   INR  --  1.2  --      Chemistry:  Recent Labs     21  0833 21  0526 21  0457    041 135   K 4.1 4.3 3.9    99 99   CO2 25 24 25   GLUCOSE 117* 109* 112*   BUN 13 16 14   CREATININE 0.71 0.78 0.71   ANIONGAP 12 12 11   LABGLOM >60 >60 >60   GFRAA >60 >60 >60   CALCIUM 8.3* 8.5* 8.5*     Recent Labs     11/19/21  0252   PROT 6.2*   LABALBU 3.8   AST 16   ALT 7   ALKPHOS 64   BILITOT 0.28*       Lab Results   Component Value Date    INR 1.2 11/20/2021    PROTIME 14.8 (H) 11/20/2021       Lab Results   Component Value Date/Time    SPECIAL NOT REPORTED 06/19/2021 05:10 PM     Lab Results   Component Value Date/Time    CULTURE NO GROWTH 06/19/2021 05:10 PM       No results found for: POCPH, PHART, PH, POCPCO2, GLA0XUK, PCO2, POCPO2, PO2ART, PO2, POCHCO3, HQK9XBO, HCO3, NBEA, PBEA, BEART, BE, THGBART, THB, WRJ4UEO, GMCG7JXD, P1PFIZOC, O2SAT, FIO2    Radiology:    XR FEMUR LEFT (MIN 2 VIEWS)    Result Date: 11/19/2021  Left femoral neck fracture extending into the medial aspect of the greater trochanter with moderate varus angulation and some degree of overriding. Decreased bone density. Moderate atherosclerotic change noted. CT Head WO Contrast    Result Date: 11/19/2021  No acute intracranial abnormality. CT Cervical Spine WO Contrast    Result Date: 11/19/2021  No acute abnormality of the cervical spine. Cervical spine degenerative changes as detailed above. Possibly significant foraminal stenosis on the right at C3-4 and C5-6. CT HIP LEFT WO CONTRAST    Result Date: 11/19/2021  Left femoral neck fracture as described. Osteoporosis. XR HIP 2-3 VW W PELVIS LEFT    Result Date: 11/19/2021  Left femoral neck fracture extending into the medial aspect of the greater trochanter with moderate varus angulation and some degree of overriding. Decreased bone density. Moderate atherosclerotic change noted.          All radiological studies reviewed                Code Status:  Full Code    Electronically signed by Nancy Pond MD on 11/21/2021 at 4:07 PM     Copy sent to Dr. Joi Torres

## 2021-11-21 NOTE — PLAN OF CARE
Altered:  Goal: Mood stable  Description: Mood stable  Outcome: Ongoing  Goal: Absence of abusive behavior  Description: Absence of abusive behavior  Outcome: Ongoing  Goal: Verbalizations of feeling emotionally comfortable while being cared for will increase  Description: Verbalizations of feeling emotionally comfortable while being cared for will increase  Outcome: Ongoing     Problem: Psychomotor Activity - Altered:  Goal: Absence of psychomotor disturbance signs and symptoms  Description: Absence of psychomotor disturbance signs and symptoms  Outcome: Ongoing     Problem: Sensory Perception - Impaired:  Goal: Demonstrations of improved sensory functioning will increase  Description: Demonstrations of improved sensory functioning will increase  Outcome: Ongoing  Goal: Decrease in sensory misperception frequency  Description: Decrease in sensory misperception frequency  Outcome: Ongoing  Goal: Able to refrain from responding to false sensory perceptions  Description: Able to refrain from responding to false sensory perceptions  Outcome: Ongoing  Goal: Demonstrates accurate environmental perceptions  Description: Demonstrates accurate environmental perceptions  Outcome: Ongoing  Goal: Able to distinguish between reality-based and nonreality-based thinking  Description: Able to distinguish between reality-based and nonreality-based thinking  Outcome: Ongoing  Goal: Able to interrupt nonreality-based thinking  Description: Able to interrupt nonreality-based thinking  Outcome: Ongoing     Problem: Sleep Pattern Disturbance:  Goal: Appears well-rested  Description: Appears well-rested  Outcome: Ongoing

## 2021-11-21 NOTE — ANESTHESIA POSTPROCEDURE EVALUATION
Department of Anesthesiology  Postprocedure Note    Patient: Tonie Toro  MRN: [id number]  YOB: 1935  Date of evaluation: 11/21/2021  Time:  12:58 PM     Procedure Summary     Date: 11/21/21 Room / Location: [address] [place] [address] 01 / [address] [place] [address]    Anesthesia Start: 1055 Anesthesia Stop: 5653    Procedure: HIP HEMIARTHROPLASTY (Left Hip) Diagnosis: (LEFT HIP FRACTURE)    Surgeons: Lenora Frey MD Responsible Provider: Andrew Mejia MD    Anesthesia Type: general ASA Status: 3          Anesthesia Type: general    Ryder Phase I:      Ryder Phase II:      Last vitals: Reviewed and per EMR flowsheets.        Anesthesia Post Evaluation    Complications: no

## 2021-11-21 NOTE — PROGRESS NOTES
Pt returned from PACU per bed. Pt is awake but drowsy. Son at bedside. IV site unremarkable. Lt hip aquacel dressing is dry and intact with no drainage. Vitals obtained. On 2L of oxygen per n/c. Bed placed in low and locked position. Bed alarm on.

## 2021-11-21 NOTE — PROGRESS NOTES
[facility].,   Section of Cardiology  Progress Note      Date:  11/21/2021  Patient: Ana Erickson  Admission:  11/19/2021  1:00 AM  Admit DX: Left displaced femoral neck fracture (HCC) [S72.002A]  Closed fracture of neck of left femur, initial encounter ([facility] Cordell Arauz  Age:  81 y.o., 1935                           LOS: 2 days     Reason for evaluation:   Preoperative cardiac evaluation  Hip fracture. SUBJECTIVE:     The patient was seen and examined. Notes and labs reviewed. There were not complications over night. Family was at the bedside. She had a good night. OBJECTIVE:    BP (!) 164/87   Pulse 87   Temp 97.8 °F (36.6 °C) (Axillary)   Resp 18   Ht 5' 6\" (1.676 m)   Wt 109 lb (49.4 kg)   SpO2 92%   BMI 17.59 kg/m²     Intake/Output Summary (Last 24 hours) at 11/21/2021 0901  Last data filed at 11/21/2021 0559  Gross per 24 hour   Intake 1040 ml   Output 250 ml   Net 790 ml       EXAM:   CONSTITUTIONAL:  awake, , no apparent distress, and appears stated age. HEENT: Normal jugular venous pulsations, no carotid bruits. Head is atraumatic, normocephalic. Eyes and oral mucosa are normal.  LUNGS: Good respiratory effort. No for increased work of breathing. On auscultation: clear to auscultation bilaterally  CARDIOVASCULAR:  Normal apical impulse, regular rate and rhythm, normal S1 and S2, no S3 or S4, and no murmur or rub noted. ABDOMEN: Soft, nontender, nondistended. Bowel sounds present. No masses or tenderness. SKIN: Warm and dry. EXTREMITIES: No lower extremity edema. Motor movement grossly intact. No cyanosis or clubbing.     Current Inpatient Medications:   donepezil  5 mg Oral Nightly    citalopram  10 mg Oral Daily    sodium chloride flush  10 mL IntraVENous 2 times per day    enoxaparin  40 mg SubCUTAneous Daily       IV Infusions (if any):   dextrose 5 % and 0.45 % NaCl 50 mL/hr at 11/20/21 1953    sodium chloride         Diagnostics:   Telemetry: Sinus      Labs:   CBC:   Recent Labs     11/20/21 0526 11/21/21 0457   WBC 11.6* 9.3   HGB 11.1* 11.2*   HCT 34.7* 34.3*    215     BMP:   Recent Labs     11/20/21 0526 11/21/21 0457    135   K 4.3 3.9   CO2 24 25   BUN 16 14   CREATININE 0.78 0.71   LABGLOM >60 >60   GLUCOSE 109* 112*     BNP: No results for input(s): BNP in the last 72 hours. PT/INR:   Recent Labs     11/20/21 0526   PROTIME 14.8*   INR 1.2     APTT:No results for input(s): APTT in the last 72 hours. CARDIAC ENZYMES:No results for input(s): CKTOTAL, CKMB, CKMBINDEX, TROPONINI in the last 72 hours. FASTING LIPID PANEL:  Lab Results   Component Value Date    HDL 62 06/20/2021    TRIG 110 06/20/2021     LIVER PROFILE:  Recent Labs     11/19/21  0252   AST 16   ALT 7   LABALBU 3.8       ASSESSMENT:    Patient Active Problem List   Diagnosis    Altered mental status    Confusion with non-focal neuro exam    Essential hypertension    HLD (hyperlipidemia)    Vitamin D deficiency    Left displaced femoral neck fracture ([facility] [address]       PLAN:    1. Preoperative cardiac evaluation  2. Hip fracture. 3. Dementia  Proceed to surgery as low risk. Will follow post op. Discussed patient with son and daughter in law. Please see orders. Discussed with patient and nursing.     Kasey Germain MD, MD

## 2021-11-22 LAB
ABSOLUTE EOS #: 0 K/UL (ref 0–0.4)
ABSOLUTE IMMATURE GRANULOCYTE: 0 K/UL (ref 0–0.3)
ABSOLUTE LYMPH #: 0.47 K/UL (ref 1–4.8)
ABSOLUTE MONO #: 1.77 K/UL (ref 0.2–0.8)
ANION GAP SERPL CALCULATED.3IONS-SCNC: 10 MMOL/L (ref 9–17)
BASOPHILS # BLD: 0 %
BASOPHILS ABSOLUTE: 0 K/UL (ref 0–0.2)
BUN BLDV-MCNC: 18 MG/DL (ref 8–23)
BUN/CREAT BLD: 20 (ref 9–20)
CALCIUM SERPL-MCNC: 8.7 MG/DL (ref 8.6–10.4)
CHLORIDE BLD-SCNC: 102 MMOL/L (ref 98–107)
CO2: 27 MMOL/L (ref 20–31)
CREAT SERPL-MCNC: 0.88 MG/DL (ref 0.5–0.9)
DIFFERENTIAL TYPE: ABNORMAL
EOSINOPHILS RELATIVE PERCENT: 0 % (ref 1–4)
GFR AFRICAN AMERICAN: >60 ML/MIN
GFR NON-AFRICAN AMERICAN: >60 ML/MIN
GFR SERPL CREATININE-BSD FRML MDRD: ABNORMAL ML/MIN/{1.73_M2}
GFR SERPL CREATININE-BSD FRML MDRD: ABNORMAL ML/MIN/{1.73_M2}
GLUCOSE BLD-MCNC: 126 MG/DL (ref 70–99)
HCT VFR BLD CALC: 34.5 % (ref 36.3–47.1)
HEMOGLOBIN: 10.7 G/DL (ref 11.9–15.1)
IMMATURE GRANULOCYTES: 0 %
LYMPHOCYTES # BLD: 4 % (ref 24–44)
MCH RBC QN AUTO: 31 PG (ref 25.2–33.5)
MCHC RBC AUTO-ENTMCNC: 31 G/DL (ref 28.4–34.8)
MCV RBC AUTO: 100 FL (ref 82.6–102.9)
MONOCYTES # BLD: 15 % (ref 1–7)
NRBC AUTOMATED: 0 PER 100 WBC
PDW BLD-RTO: 14.2 % (ref 11.8–14.4)
PLATELET # BLD: 253 K/UL (ref 138–453)
PLATELET ESTIMATE: ABNORMAL
PMV BLD AUTO: 9.3 FL (ref 8.1–13.5)
POTASSIUM SERPL-SCNC: 4.4 MMOL/L (ref 3.7–5.3)
RBC # BLD: 3.45 M/UL (ref 3.95–5.11)
RBC # BLD: ABNORMAL 10*6/UL
SEG NEUTROPHILS: 81 % (ref 36–66)
SEGMENTED NEUTROPHILS ABSOLUTE COUNT: 9.56 K/UL (ref 1.8–7.7)
SODIUM BLD-SCNC: 139 MMOL/L (ref 135–144)
WBC # BLD: 11.8 K/UL (ref 3.5–11.3)
WBC # BLD: ABNORMAL 10*3/UL

## 2021-11-22 PROCEDURE — 1200000000 HC SEMI PRIVATE

## 2021-11-22 PROCEDURE — 6370000000 HC RX 637 (ALT 250 FOR IP): Performed by: FAMILY MEDICINE

## 2021-11-22 PROCEDURE — 97535 SELF CARE MNGMENT TRAINING: CPT

## 2021-11-22 PROCEDURE — 6370000000 HC RX 637 (ALT 250 FOR IP): Performed by: ORTHOPAEDIC SURGERY

## 2021-11-22 PROCEDURE — 85025 COMPLETE CBC W/AUTO DIFF WBC: CPT

## 2021-11-22 PROCEDURE — 97163 PT EVAL HIGH COMPLEX 45 MIN: CPT

## 2021-11-22 PROCEDURE — 97530 THERAPEUTIC ACTIVITIES: CPT

## 2021-11-22 PROCEDURE — 97167 OT EVAL HIGH COMPLEX 60 MIN: CPT

## 2021-11-22 PROCEDURE — 2580000003 HC RX 258: Performed by: ORTHOPAEDIC SURGERY

## 2021-11-22 PROCEDURE — 36415 COLL VENOUS BLD VENIPUNCTURE: CPT

## 2021-11-22 PROCEDURE — 6360000002 HC RX W HCPCS: Performed by: ORTHOPAEDIC SURGERY

## 2021-11-22 PROCEDURE — 97112 NEUROMUSCULAR REEDUCATION: CPT

## 2021-11-22 PROCEDURE — 80048 BASIC METABOLIC PNL TOTAL CA: CPT

## 2021-11-22 RX ORDER — LORAZEPAM 0.5 MG/1
0.5 TABLET ORAL 2 TIMES DAILY PRN
Status: DISCONTINUED | OUTPATIENT
Start: 2021-11-22 | End: 2021-11-23

## 2021-11-22 RX ADMIN — SODIUM CHLORIDE, PRESERVATIVE FREE 10 ML: 5 INJECTION INTRAVENOUS at 07:39

## 2021-11-22 RX ADMIN — DEXTROSE AND SODIUM CHLORIDE: 5; 450 INJECTION, SOLUTION INTRAVENOUS at 12:35

## 2021-11-22 RX ADMIN — CITALOPRAM HYDROBROMIDE 10 MG: 10 TABLET ORAL at 07:39

## 2021-11-22 RX ADMIN — HYDROCODONE BITARTRATE AND ACETAMINOPHEN 1 TABLET: 5; 325 TABLET ORAL at 05:38

## 2021-11-22 RX ADMIN — LORAZEPAM 0.5 MG: 0.5 TABLET ORAL at 20:53

## 2021-11-22 RX ADMIN — ENOXAPARIN SODIUM 40 MG: 100 INJECTION SUBCUTANEOUS at 07:38

## 2021-11-22 RX ADMIN — HYDROCODONE BITARTRATE AND ACETAMINOPHEN 1 TABLET: 5; 325 TABLET ORAL at 20:53

## 2021-11-22 RX ADMIN — HYDROCODONE BITARTRATE AND ACETAMINOPHEN 1 TABLET: 5; 325 TABLET ORAL at 12:35

## 2021-11-22 ASSESSMENT — PAIN SCALES - GENERAL
PAINLEVEL_OUTOF10: 5
PAINLEVEL_OUTOF10: 5
PAINLEVEL_OUTOF10: 4
PAINLEVEL_OUTOF10: 3

## 2021-11-22 ASSESSMENT — PAIN - FUNCTIONAL ASSESSMENT: PAIN_FUNCTIONAL_ASSESSMENT: PREVENTS OR INTERFERES WITH ALL ACTIVE AND SOME PASSIVE ACTIVITIES

## 2021-11-22 ASSESSMENT — PAIN DESCRIPTION - PAIN TYPE
TYPE: SURGICAL PAIN
TYPE: SURGICAL PAIN

## 2021-11-22 ASSESSMENT — PAIN DESCRIPTION - DESCRIPTORS: DESCRIPTORS: DISCOMFORT

## 2021-11-22 ASSESSMENT — PAIN DESCRIPTION - FREQUENCY: FREQUENCY: CONTINUOUS

## 2021-11-22 ASSESSMENT — PAIN DESCRIPTION - PROGRESSION
CLINICAL_PROGRESSION: GRADUALLY WORSENING
CLINICAL_PROGRESSION: NOT CHANGED

## 2021-11-22 ASSESSMENT — PAIN DESCRIPTION - LOCATION: LOCATION: HIP

## 2021-11-22 ASSESSMENT — PAIN DESCRIPTION - ONSET: ONSET: ON-GOING

## 2021-11-22 ASSESSMENT — PAIN DESCRIPTION - ORIENTATION: ORIENTATION: LEFT

## 2021-11-22 NOTE — PROGRESS NOTES
Physical Therapy    Facility/Department: [facility] MED SURG  Initial Assessment    NAME: Ryan Musa  : 1935  MRN: [id number]    Date of Service: 2021    Discharge Recommendations:  Patient would benefit from continued therapy after discharge     Pt presented to ED on 21 by EMS for evaluation of unwitnessed fall at nursing home. Patient is holding left leg and complaining of severe pain. Unclear whether she hit her head during fall. No other injuries noted. Patient unable to provide meaningful history secondary to dementia. Son at bedside. He reports up until today patient was able to ambulate with assistance from walker. Xray left femur revealed acute left femoral neck fracture  Pt taken to surgery on 21 for open treatment of femoral neck fracture with prosthetic replacement secondary Left closed displaced femoral neck fracture      RN reports patient is medically stable for therapy treatment this date. Chart reviewed prior to treatment and patient is agreeable for therapy. Assessment   Body structures, Functions, Activity limitations: Decreased functional mobility ; Decreased cognition; Decreased posture; Decreased ADL status; Decreased endurance; Decreased ROM; Decreased strength  Assessment: Pt with deficits of ROM/strength of L hip & LLE, bed mobility, transfers, ambulation, balance, COGNITION, safety awareness and endurance, & is easily anxious with any mobility  this session. Pt required 2 assist for safe transfers, unable to safely ambulate & required 2 assist with Leandrew Sotero to mobilize to chair gait. With current deficits, Pt HIGH risk for falls & requires continued PT to maximize independence with functional mobility, balance, safety awareness & activity tolerance. Pt currently functioning below baseline. Would suggest additional therapy at time of discharge to maximize long term outcomes and prevent re-admission.  Please refer to AM-PAC score for current level of function. Prognosis: Good  Decision Making: High Complexity  Exam: ROM, MMT, functional mobility, activity tolerance, Balance, & [employer] AM-PAC 6 Clicks Basic Mobility  Clinical Presentation: evolving  PT Education: Goals; PT Role; Plan of Care; Functional Mobility Training; Transfer Training; General Safety; Orientation  Patient Education: Ed pt on functional mobility, safety awareness, importance of being up & OOB to regain strength, & prevention of sedentary complications, circulation ex's,  & optimal breathing techniques  REQUIRES PT FOLLOW UP: Yes  Activity Tolerance  Activity Tolerance: Patient limited by fatigue; Patient limited by pain; Patient limited by cognitive status       Patient Diagnosis(es): The encounter diagnosis was Closed fracture of neck of left femur, initial encounter ([facility] [address] has a past medical history of Dementia ([facility] [address] and Hypertension. has a past surgical history that includes Tubal ligation; Appendectomy; and hip surgery (Left, 11/21/2021).     Restrictions  Restrictions/Precautions  Restrictions/Precautions: General Precautions, Fall Risk, Surgical Protocols  Position Activity Restriction  Hip Precautions: Posterior hip precautions, No hip flexion > 90 degrees, No ADduction, No hip internal rotation  Other position/activity restrictions: FWB, heels off bed at all times, ABD pillow, posterior hip precautions, confusion, alarms, RUE IV, one on one staff  Vision/Hearing  Vision: Impaired (pt states she does not wear glasses; pt was unable to read white board and said was blurry)  Hearing: Exceptions to [facility]  Hearing Exceptions: Hard of hearing/hearing concerns     Subjective  General  Chart Reviewed: Yes  Patient assessed for rehabilitation services?: Yes  Additional Pertinent Hx: dementia, HTN  Response To Previous Treatment: Not applicable  General Comment  Comments: RN okays PT  Subjective  Subjective: Pt agreeable to PT  Pain Screening  Patient Currently in Pain: Yes Orientation  Orientation  Overall Orientation Status: Impaired  Orientation Level: Disoriented to place; Disoriented to time; Disoriented to situation  Social/Functional History  Social/Functional History  Lives With: Alone (per pt)  Type of Home: Assisted living (per chart)  Home Layout: One level  Home Access: Level entry  Entrance Stairs - Number of Steps: 3  Bathroom Shower/Tub: Walk-in shower (per chart)  Bathroom Toilet: Standard  Bathroom Equipment: Shower chair, Hand-held shower, Grab bars around toilet (per chart)  Home Equipment: 4 wheeled walker, Inbox (per chart)  Receives Help From: Friend(s) (pt states she has good friends)  ADL Assistance: Needs assistance (pt states she needs help at times)  Homemaking Assistance: Needs assistance (pt gets meals on wheels; does own laundry & cleaning per chart)  Homemaking Responsibilities: Yes  Ambulation Assistance: Independent (pt states she sometimes uses walker per pt)  Transfer Assistance: Independent  Active : No  Occupation: Retired  Type of occupation: ;  per pt  [address]: dancing per pt  IADL Comments: *Information entered copied from chart record since pt is very confused/poor historain & unable to provide information & will need verified for accuracy  Additional Comments: Pt states she has had a few falls. Cognition   Cognition  Overall Cognitive Status: Exceptions  Arousal/Alertness: Delayed responses to stimuli  Following Commands: Inconsistently follows commands; Follows one step commands with repetition; Follows one step commands with increased time  Attention Span: Difficulty attending to directions; Difficulty dividing attention;  Attends with cues to redirect  Memory: Decreased short term memory; Decreased recall of recent events; Decreased long term memory; Decreased recall of precautions; Decreased recall of biographical Information  Safety Judgement: Decreased awareness of need for assistance; Decreased awareness of need for safety  Problem Solving: Assistance required to generate solutions; Assistance required to implement solutions; Assistance required to correct errors made; Assistance required to identify errors made; Decreased awareness of errors  Insights: Not aware of deficits  Initiation: Requires cues for all  Sequencing: Requires cues for all    Objective     Observation/Palpation  Posture: Poor  Observation: RUE IV, hip abd wedge in place supine in bed and pt has 1 on one staff  Scar: L hip incision and covered with acquacel dressing    AROM RLE (degrees)  RLE General AROM: WFL with assist  AROM LLE (degrees)  LLE General AROM: limited hip flexion & ABD, knee WFL with assist  AROM RUE (degrees)  RUE General AROM: see OT assessment  AROM LUE (degrees)  LUE General AROM: see OT assessment  Strength RLE  Comment: at least 3/5 since pt can move antigravity but not formally assessed due to pt not able to follow commands  Strength LLE  Comment: 2-/5 hip, 3-/5 knee  Strength RUE  Comment: see OT assessment  Strength LUE  Comment: see OT assessment  Tone RLE  RLE Tone: Normotonic  Tone LLE  LLE Tone: Normotonic  Sensation  Overall Sensation Status: Impaired (pt reports intermittent paresthesias in hands/feet)  Bed mobility  Supine to Sit: Maximum assistance; Dependent/Total; 2 Person assistance  Sit to Supine: Unable to assess  Scooting: Maximal assistance; 2 Person assistance; Dependent/Total  Comment: Max verbal instruction/tactile assist for UE hand placement on rail, initiating all movements and proper log rolling tech + Max support of BLE's to come in/OOB , use of UB to scoot completely out to EOB with B foot placement to establish safe sitting balance, pursed lip breathing,  pacing, encouragement, assist with line mgt,  with increased time needed all to increase safety & reduce fall risk  Transfers  Sit to Stand: Maximum Assistance; 2 Person Assistance  Stand to sit: Maximum Assistance; 2 Person Assistance  Bed to Chair: Dependent/Total  Stand Pivot Transfers: Dependent/Total  Lateral Transfers: Dependent/Total  Comment: Max Ed + tactile assist on correct use of upper body for safe sit/standRequired [employer], placed & stood into device with 2 max assist, mobilized to chair & sat from device with 2 max assist as well as reaching back, safety features of lift, upright posture and weight shifting, pursed lip breathing, controlled stand to sit to increase safety/reduce falls  Ambulation  Ambulation?: No     Balance  Sitting - Static: Poor  Sitting - Dynamic: Poor  Standing - Static: Poor; + (in [employer])  Comments: pt with poor trunk control, MaxA to maintain sitting EOB with moderate posterior lean  Exercises  Comments: Ed circulation ex's, & ex's to move what moves to maintain strength & joint congruency     Pt completed seated ant/post & lateral WS seated & completed sit to stands & completed static standing weight shifts & picking feet up off floor with UB support at [employer] to improve core strength & stability   All lines intact, call light within reach, and patient positioned comfortably at end of treatment. All patient needs addressed prior to ending therapy session.             Plan   Plan  Times per week: 1-2x/D, 5-6D/week  Current Treatment Recommendations: Strengthening, ROM, Balance Training, Functional Mobility Training, Transfer Training, Endurance Training, ADL/Self-care Training, Gait Training, Safety Education & Training, Patient/Caregiver Education & Training, Equipment Evaluation, Education, & procurement  Safety Devices  Type of devices: Bed alarm in place, Call light within reach, Gait belt, Patient at risk for falls, Left in chair, Nurse notified    G-Code       OutComes Score                                                  AM-PAC Score  AM-PAC Inpatient Mobility Raw Score : 9 (11/22/21 0846)  AM-PAC Inpatient T-Scale Score : 30.55 (11/22/21 0846)  Mobility Inpatient CMS 0-100% Score: 81.38 (11/22/21 4546)  Mobility Inpatient CMS G-Code Modifier : CM (11/22/21 5374)          Goals  Short term goals  Time Frame for Short term goals: 12 visits  Short term goal 1: Inc bed mobility to [employer] term goal 2: Pt able to transfer from bed to chair with min assist using safest device  Short term goal 3: Pt to tolerate sitting EOB up to 30 minutes with UB/LB support to improve core stability & repositioning for pressure relief & prevent sedentary complications  Short term goal 4: Pt able to tolerate 30-40 min of activity to include ex, NMR & functional to facilitate activity tolerance to [facility]       Therapy Time   Individual Concurrent Group Co-treatment   Time In 0749         Time Out 0847         Minutes 58+10=68              Additional 10 minutes for chart review      Treatment time: 53 minutes  Co-treatment with OT warranted secondary to decreased safety and independence requiring 2 skilled therapy professionals to address individual discipline's goals. PT addressing pre gait trunk strengthening, weight shifting prior to transfers, transfer training and postural control in sitting.         [address], PT

## 2021-11-22 NOTE — DISCHARGE INSTR - COC
Continuity of Care Form    Patient Name: Favian Bell   :  1935  MRN:  [id number]    Admit date:  2021  Discharge date:  2021     Code Status Order: Full Code   Advance Directives:      Admitting Physician:  Isma Elizabeth MD  PCP: Jorge Duque MD    Discharging Nurse: [facility] Unit/Room#:   Discharging Unit Phone Number: [phone]    Emergency Contact:   Extended Emergency Contact Information  Primary Emergency Contact: [facility] Phone: [phone]  Relation: Child  [facility] Phone: [phone]  Mobile Phone: [phone]  Relation: Grandchild   needed? No    Past Surgical History:  Past Surgical History:   Procedure Laterality Date    APPENDECTOMY      HIP SURGERY Left 2021    HIP HEMIARTHROPLASTY performed by Jairo Asencio MD at [address] [facility]         Immunization History: There is no immunization history on file for this patient.     Active Problems:  Patient Active Problem List   Diagnosis Code    Altered mental status R41.82    Confusion with non-focal neuro exam R41.0    Essential hypertension I10    HLD (hyperlipidemia) E78.5    Vitamin D deficiency E55.9    Left displaced femoral neck fracture ([address] S72.002A       Isolation/Infection:   Isolation            No Isolation          Patient Infection Status       None to display            Nurse Assessment:  Last Vital Signs: /65   Pulse 88   Temp 97.6 °F (36.4 °C) (Oral)   Resp 16   Ht 5' 6\" (1.676 m)   Wt 109 lb (49.4 kg)   SpO2 91%   BMI 17.59 kg/m²     Last documented pain score (0-10 scale): Pain Level: 5  Last Weight:   Wt Readings from Last 1 Encounters:   21 109 lb (49.4 kg)     Mental Status:  disoriented    IV Access:  - None    Nursing Mobility/ADLs:  Walking   Dependent  Transfer  Dependent  Bathing  Dependent  Dressing  Dependent  Toileting  Dependent  Feeding  Assisted  Med Admin  Assisted  Med Delivery   whole    Wound Care Documentation and Therapy:        Elimination:  Continence: Bowel: No  Bladder: No  Urinary Catheter: None   Colostomy/Ileostomy/Ileal Conduit: No       Date of Last BM: ***    Intake/Output Summary (Last 24 hours) at 11/22/2021 1451  Last data filed at 11/22/2021 0541  Gross per 24 hour   Intake 831 ml   Output --   Net 831 ml     I/O last 3 completed shifts: In: 1167 [I.V.:1167]  Out: 200 [Urine:200]    Safety Concerns:     History of Falls (last 30 days) and At Risk for Falls    Impairments/Disabilities:      dementia    Nutrition Therapy:  Current Nutrition Therapy:   - Oral Diet:  General    Routes of Feeding: Oral  Liquids: No Restrictions  Daily Fluid Restriction: no  Last Modified Barium Swallow with Video (Video Swallowing Test): not done    Treatments at the Time of Hospital Discharge:   Respiratory Treatments: na  Oxygen Therapy:  is not on home oxygen therapy. Ventilator:    - No ventilator support    Rehab Therapies: Physical Therapy and Occupational Therapy  Weight Bearing Status/Restrictions: No weight bearing restirctions  Other Medical Equipment (for information only, NOT a DME order):  hospital bed  Other Treatments: aquacel to left hip    Patient's personal belongings (please select all that are sent with patient):  None    RN SIGNATURE:  Electronically signed by Ramo Liu RN on 11/28/21 at 11:50 AM EST    CASE MANAGEMENT/SOCIAL WORK SECTION    Inpatient Status Date: ***    Readmission Risk Assessment Score:  Readmission Risk              Risk of Unplanned Readmission:  14           Discharging to Facility/ Agency   Name: Your care will be continued at a skilled nursing facility:  Name:  Name: [facility]  Address: [facility], [address] [facility]  Phone: [phone]  Fax:  [phone]   Address:   [facility]   Address:  Phone:  Fax:    Dialysis Facility (if applicable)   Name:  Address:  Dialysis Schedule:  Phone:  Fax:    / signature: Electronically signed by SANDRO Ravi on 11/22/21 at 2:56 PM EST    PHYSICIAN SECTION    Prognosis: Fair    Condition at Discharge: Stable    Rehab Potential (if transferring to Rehab): Fair    Recommended Labs or Other Treatments After Discharge:     Physician Certification: I certify the above information and transfer of Macarena Wynne  is necessary for the continuing treatment of the diagnosis listed and that she requires [facility] for greater 30 days.      Update Admission H&P: No change in H&P    PHYSICIAN SIGNATURE:  Electronically signed by Suha Philippe MD on 11/28/21 at 2:26 PM EST

## 2021-11-22 NOTE — PLAN OF CARE
Problem: Skin Integrity:  Goal: Will show no infection signs and symptoms  Description: Will show no infection signs and symptoms  11/22/2021 0010 by Ean Pereira RN  Outcome: Ongoing  Note: Checked for incontinence every 2 hours and prn. Pericare as needed. Assisted to reposition off back frequently. On waffle mattress. Heels off bed with pillows. 11/21/2021 1537 by Jeffrey Wiley RN  Outcome: Ongoing     Problem: Skin Integrity:  Goal: Absence of new skin breakdown  Description: Absence of new skin breakdown  11/22/2021 0010 by Ean Pereira RN  Outcome: Ongoing     Problem: Falls - Risk of:  Goal: Will remain free from falls  Description: Will remain free from falls  11/22/2021 0010 by Ean Pereira RN  Outcome: Ongoing  Note: Siderails up x 2  Hourly rounding  Call light in reach  Instructed to call for assist before attempting out of bed.   Remains free from falls and accidental injury at this time   Floor free from obstacles  Bed is locked and in lowest position  Adequate lighting provided  Bed alarm on, [place]alling star and Stay with Me signs posted  Patient is also on a continuous 1:1           Problem: Falls - Risk of:  Goal: Absence of physical injury  Description: Absence of physical injury  11/22/2021 0010 by Ean Pereira RN  Outcome: Ongoing     Problem: SAFETY  Goal: Free from accidental physical injury  11/22/2021 0010 by Ean Pereira RN  Outcome: Ongoing     Problem: DAILY CARE  Goal: Daily care needs are met  11/22/2021 0010 by Ean Pereira RN  Outcome: Ongoing     Problem: PAIN  Goal: Patient's pain/discomfort is manageable  11/22/2021 0010 by Ean Pereira RN  Outcome: Ongoing     Problem: SKIN INTEGRITY  Goal: Skin integrity is maintained or improved  11/22/2021 0010 by Ean Pereira RN  Outcome: Ongoing     Problem: Confusion - Acute:  Goal: Absence of continued neurological deterioration signs and

## 2021-11-22 NOTE — PROGRESS NOTES
Progress note  [facility]    Adult Hospitalist      Name: Elvia Campbell  MRN: [id number]     Teresalyside: [de-identified]  Room: 2001/2001-02    Admit Date: 11/19/2021  1:00 AM  PCP: Shonda Trevino MD    Primary Problem  Active Problems:    Left displaced femoral neck fracture ([address]  Resolved Problems:    * No resolved hospital problems. *        Assesment:       · Left displaced femoral neck fracture  · Essential hypertension  · Major depressive disorder  · Advanced Dementia  · Vitamin D deficiency  · Agitation           Plan:     · Admit to MedSurg telemetry  · O2 maintain oxygen saturation greater than 92%. · Pain control  · Orthopedic consult, Open treatment of Left displaced femoral neck fracture with prosthetic replacement on 11/21/21  · Got clearance from cardiology  · PT OT  · Continue to monitor/telemetry/CBC with differential daily/BMP daily  · Ativan  · DVT and GI prophylaxis.   Continue medications as below      Scheduled Meds:   donepezil  5 mg Oral Nightly    citalopram  10 mg Oral Daily    sodium chloride flush  10 mL IntraVENous 2 times per day    enoxaparin  40 mg SubCUTAneous Daily     Continuous Infusions:   dextrose 5 % and 0.45 % NaCl 50 mL/hr at 11/22/21 1235    sodium chloride 125 mL/hr at 11/21/21 1254     PRN Meds:  LORazepam, 0.5 mg, BID PRN  HYDROcodone 5 mg - acetaminophen, 1 tablet, Q6H PRN  HYDROcodone 5 mg - acetaminophen, 2 tablet, Q6H PRN  morphine, 2 mg, Q4H PRN  sodium chloride flush, 10 mL, PRN  sodium chloride, 25 mL, PRN  ondansetron, 4 mg, Q8H PRN   Or  ondansetron, 4 mg, Q6H PRN  magnesium hydroxide, 30 mL, Daily PRN  acetaminophen, 650 mg, Q6H PRN   Or  acetaminophen, 650 mg, Q6H PRN  potassium chloride, 40 mEq, PRN   Or  potassium alternative oral replacement, 40 mEq, PRN   Or  potassium chloride, 10 mEq, PRN        Chief Complaint:     Chief Complaint   Patient presents with    Fall     left thigh pain         History of Present Illness:      Patient seen and examined at bedside. Patient is still confused and unable to provide meaningful history . Sitter at bedside        HPI:    Elvia Velázquez is a 81 y.o.  female who presents with Fall (left thigh pain)      52-year-old lady with past medical history of hypertension presented to ER after an unwitnessed fall at nursing home. Patient is complaining of left leg pain and complaining of severe pain. Patient historian    Hemoglobin was 10.8. BMP was unremarkable. Blood pressure was 157/86. CT head and cervical spine was negative. X-ray hip shows left femoral neck fracture extending into the medial aspect of the greater trochanter with moderate varus angulation and some degree of overriding. I have personally reviewed the past medical history, past surgical history, medications, social history, and family history, and summarized in the note. Review of Systems:     Limited review of systems      Past Medical History:     Past Medical History:   Diagnosis Date    Dementia ([facility]     Hypertension         Past Surgical History:     Past Surgical History:   Procedure Laterality Date    APPENDECTOMY      HIP SURGERY Left 11/21/2021    HIP HEMIARTHROPLASTY performed by Katerina Carmona MD at [facility]          Medications Prior to Admission:       Prior to Admission medications    Medication Sig Start Date End Date Taking? Authorizing Provider   enoxaparin (LOVENOX) 40 MG/0.4ML injection Inject 0.4 mLs into the skin daily for 42 doses 11/21/21 1/2/22 Yes Katerina Carmona MD   HYDROcodone-acetaminophen (NORCO) 5-325 MG per tablet Take 1 tablet by mouth every 6 hours as needed for Pain for up to 7 days.  Do not exceed 3000 mg of Acetaminophen in 24 hrs. 11/21/21 11/28/21 Yes Katerina Carmona MD   acetaminophen (TYLENOL) 500 MG tablet Take 1,000 mg by mouth every 8 hours 8/1/21  Yes Historical Provider, MD   citalopram (CELEXA) 10 MG tablet Take 10 mg by mouth daily    Historical Provider, MD   donepezil (ARICEPT) 5 MG tablet Take 5 mg by mouth nightly    Historical Provider, MD   cloNIDine (CATAPRES) 0.1 MG tablet Take 0.1 mg by mouth nightly as needed    Historical Provider, MD        Allergies:       Patient has no known allergies. Social History:     Tobacco:    reports that she has never smoked. She has never used smokeless tobacco.  Alcohol:      reports current alcohol use of about 1.0 standard drink of alcohol per week. Drug Use:  reports no history of drug use.     Family History:     Family History   Problem Relation Age of Onset    No Known Problems Mother     Heart Disease Father          Physical Exam:     Vitals:  /69   Pulse 95   Temp 98.8 °F (37.1 °C) (Oral)   Resp 16   Ht 5' 6\" (1.676 m)   Wt 109 lb (49.4 kg)   SpO2 90%   BMI 17.59 kg/m²   Temp (24hrs), Av.9 °F (36.6 °C), Min:97.5 °F (36.4 °C), Max:98.8 °F (37.1 °C)          General appearance -sleeping  Mental status -confused  Head - normocephalic and atraumatic  Eyes - pupils equal and reactive, extraocular eye movements intact, conjunctiva clear  Ears - hearing appears to be intact  Nose - no drainage noted  Mouth - mucous membranes moist  Neck - supple, no carotid bruits, thyroid not palpable  Chest - clear to auscultation, normal effort  Heart - normal rate, regular rhythm, no murmur  Abdomen - soft, nontender, nondistended, bowel sounds present all four quadrants, no masses, hepatomegaly or splenomegaly  Neurological -unable to assess  Extremities - peripheral pulses palpable, no pedal edema or calf pain with palpation  Skin - no gross lesions, rashes, or induration noted        Data:     Labs:    Hematology:  Recent Labs     21  0526 21  0457 21  0615   WBC 11.6* 9.3 11.8*   RBC 3.52* 3.50* 3.45*   HGB 11.1* 11.2* 10.7*   HCT 34.7* 34.3* 34.5*   MCV 98.6 98.0 100.0   MCH 31.5 32.0 31.0   MCHC 32.0 32.7 31.0   RDW 14.2 14.3 14.2    215 253   MPV 9.2 9.3 9.3   INR 1.2  -- --      Chemistry:  Recent Labs     11/20/21  0526 11/21/21  0457 11/22/21  0615    135 139   K 4.3 3.9 4.4   CL 99 99 102   CO2 24 25 27   GLUCOSE 109* 112* 126*   BUN 16 14 18   CREATININE 0.78 0.71 0.88   ANIONGAP 12 11 10   LABGLOM >60 >60 >60   GFRAA >60 >60 >60   CALCIUM 8.5* 8.5* 8.7     No results for input(s): PROT, LABALBU, LABA1C, B5JDZAI, X5YHZDZ, FT4, TSH, AST, ALT, LDH, GGT, ALKPHOS, LABGGT, BILITOT, BILIDIR, AMMONIA, AMYLASE, LIPASE, LACTATE, CHOL, HDL, LDLCHOLESTEROL, CHOLHDLRATIO, TRIG, VLDL, BMW68LE, PHENYTOIN, PHENYF, URICACID, POCGLU in the last 72 hours. Lab Results   Component Value Date    INR 1.2 11/20/2021    PROTIME 14.8 (H) 11/20/2021       Lab Results   Component Value Date/Time    SPECIAL NOT REPORTED 06/19/2021 05:10 PM     Lab Results   Component Value Date/Time    CULTURE NO GROWTH 06/19/2021 05:10 PM       No results found for: POCPH, PHART, PH, POCPCO2, MVS2AUR, PCO2, POCPO2, PO2ART, PO2, POCHCO3, WWH4IVU, HCO3, NBEA, PBEA, BEART, BE, THGBART, THB, KFT2ZLI, EPGM5VBY, A3EQMVKL, O2SAT, FIO2    Radiology:    XR FEMUR LEFT (MIN 2 VIEWS)    Result Date: 11/19/2021  Left femoral neck fracture extending into the medial aspect of the greater trochanter with moderate varus angulation and some degree of overriding. Decreased bone density. Moderate atherosclerotic change noted. CT Head WO Contrast    Result Date: 11/19/2021  No acute intracranial abnormality. CT Cervical Spine WO Contrast    Result Date: 11/19/2021  No acute abnormality of the cervical spine. Cervical spine degenerative changes as detailed above. Possibly significant foraminal stenosis on the right at C3-4 and C5-6. CT HIP LEFT WO CONTRAST    Result Date: 11/19/2021  Left femoral neck fracture as described. Osteoporosis.      XR HIP 2-3 VW W PELVIS LEFT    Result Date: 11/19/2021  Left femoral neck fracture extending into the medial aspect of the greater trochanter with moderate varus angulation and some degree of overriding. Decreased bone density. Moderate atherosclerotic change noted. All radiological studies reviewed                Code Status:  Full Code    Electronically signed by Ruby Tsang MD on 11/22/2021 at 6:51 PM     Copy sent to Dr. Vijaya Pop MD    This note was created with the assistance of a speech-recognition program.  Although the intention is to generate a document that actually reflects the content of the visit, no guarantees can be provided that every mistake has been identified and corrected by editing. Note was updated later by me after  physical examination and  completion of the assessment.

## 2021-11-22 NOTE — PROGRESS NOTES
Occupational Therapy   Occupational Therapy Initial Assessment  Date: 2021   Patient Name: Dolores Marquez  MRN: [id number]     : 1935    RN Romy Iglesias reports patient is medically stable for therapy treatment this date. Chart reviewed prior to treatment and patient is agreeable for therapy. All lines intact and patient positioned comfortably at end of treatment. All patient needs addressed prior to ending therapy session. Pt currently functioning below baseline. Would suggest additional therapy at time of discharge to maximize long term outcomes and prevent re-admission. Please refer to AM-PAC score for current level of function. Date of Service: 2021    Discharge Recommendations:  Patient would benefit from continued therapy after discharge  OT Equipment Recommendations  Equipment Needed:  (continue to assess)    Assessment   Performance deficits / Impairments: Decreased functional mobility ; Decreased safe awareness; Decreased balance; Decreased coordination; Decreased ADL status; Decreased cognition; Decreased vision/visual deficit; Decreased posture; Decreased endurance; Decreased high-level IADLs; Decreased strength; Decreased fine motor control  Assessment: At this time, pt is a high fall risk with 2 staff assist required when up and use of jessica stedy. Skilled OT is indicated for maximizing functional I and safety as able to reduce caregiver assist/burden.   Prognosis: Fair  Decision Making: High Complexity  OT Education: Orientation; Precautions; Plan of Care; Energy Conservation; Transfer Training; OT Role  Patient Education: safety in function, call light use/fall prevention, pursed lip breathing, proper bed mob tech, postural control and weight shifting, recommendations for continued therapy and benefits of being up OOB as able  Barriers to Learning: memory and cognitive deficits  REQUIRES OT FOLLOW UP: Yes  Activity Tolerance  Activity Tolerance: Patient limited by fatigue; Treatment limited secondary to decreased cognition; Patient limited by pain  Activity Tolerance: poor; max edu and encouragment needed  Safety Devices  Safety Devices in place: Yes  Type of devices: Call light within reach; Chair alarm in place; Left in chair; Patient at risk for falls; Gait belt; Nurse notified (one on one staff in with pt upon exit; BLE's elevated)           Patient Diagnosis(es): The encounter diagnosis was Closed fracture of neck of left femur, initial encounter ([facility] [address] has a past medical history of Dementia ([facility] [address] and Hypertension. has a past surgical history that includes Tubal ligation and Appendectomy. PER chart: Vick Rodrigues is a 81 y.o.  female who presents with Fall (left thigh pain)        52-year-old lady with past medical history of hypertension presented to ER after an unwitnessed fall at nursing home.          Pt is s/p:   Preoperative Diagnosis:   1. Left closed displaced femoral neck fracture  2. Dementia  3. Vitamin D deficiency  4. Body mass index is 17.59 kg/m².     Postoperative Diagnosis:   1. same     Procedures Performed:  (11/21/2021)  1. Open treatment of Left displaced femoral neck fracture with prosthetic replacement      Restrictions  Restrictions/Precautions  Restrictions/Precautions: General Precautions, Fall Risk, Surgical Protocols  Position Activity Restriction  Hip Precautions: Posterior hip precautions, No hip flexion > 90 degrees, No ADduction, No hip internal rotation  Other position/activity restrictions: FWB, heels off bed at all times, ABD pillow, posterior hip precautions, confusion, alarms, RUE IV, one on one staff    Subjective   General  Chart Reviewed: Yes  Patient assessed for rehabilitation services?: Yes  Family / Caregiver Present: No  *pt states she has all over pain/was unable to localize and PCT present; Pt with obvious grimaces and c/o pain with LLE movements.       Social/Functional History  Social/Functional History  Lives With: Alone (per pt)  Type of Home: Assisted living (per chart)  Home Layout: One level  Home Access: Level entry  Entrance Stairs - Number of Steps: 3  Bathroom Shower/Tub: Walk-in shower (per chart)  Bathroom Toilet: Standard  Bathroom Equipment: Shower chair, Hand-held shower, Grab bars around toilet (per chart)  Home Equipment: 4 wheeled walker, Cane (per chart)  Receives Help From: Friend(s) (pt states she has good friends)  ADL Assistance: Needs assistance (pt states she needs help at times)  Homemaking Assistance: Needs assistance (pt gets meals on wheels; does own laundry & cleaning per chart)  Homemaking Responsibilities: Yes  Ambulation Assistance: Independent (pt states she sometimes uses walker per pt)  Transfer Assistance: Independent  Active : No  Occupation: Retired  Type of occupation:   per pt  Leisure & Hobbies: dancing per pt  IADL Comments: *Information entered copied from chart record since pt is very confused/poor historian & unable to provide information & will need verified for accuracy  Additional Comments: Pt states she has had a few falls; per chart fall at nursing home        Objective   Vision: Impaired (pt states she does not wear glasses; pt was unable to read white board and said it was blurry)  Hearing: Exceptions to [facility]  Hearing Exceptions: Hard of hearing/hearing concerns    Orientation  Overall Orientation Status: Impaired (pt with confusion and slow and delayed processing)  Orientation Level: Disoriented to place;  Disoriented to time; Disoriented to situation; Disoriented X4; Oriented to person (pt was able to state birth month/day however not year)  Observation/Palpation  Posture: Poor  Observation: RUE IV, hip abd wedge in place supine in bed and pt has 1 on one staff  Scar: L hip incision and covered with acquacel dressing  Balance  Sitting Balance: Maximum assistance  Standing Balance: Maximum assistance (x2 in jessica quintero)  Standing Balance  Time: stand neel < 20 seconds in jessica stedy; dangle EOB > 8 mins  Functional Mobility  Functional Mobility Comments: N/T and not safe or appropriate at this time; jessica stedy used for transfers  Toilet Transfers  Toilet Transfers Comments: N/T     ADL  Feeding: Setup; Stand by assistance  Grooming: Setup; Moderate assistance; seated   UE Bathing: Setup; Moderate assistance  LE Bathing: Setup; Dependent/Total  UE Dressing: Setup; Maximum assistance (with hosp gown)  LE Dressing: Setup; Maximum assistance; Dependent/Total (max assist for B socks supine in bed; x2 staff with jessica stedy for gown mgt)  Toileting: Dependent/Total  Additional Comments: *Pt is DEP when up with 2 staff assist and use of jessica stedy. Tone RUE  RUE Tone: Normotonic  Tone LUE  LUE Tone: Normotonic  Coordination  Movements Are Fluid And Coordinated: No  Coordination and Movement description: Fine motor impairments     Bed mobility  Supine to Sit: Maximum assistance; Dependent/Total; 2 Person assistance  Sit to Supine: Unable to assess (pt agreed to sit up in recliner provided by therapy after edu on the benefits of being up OOB)  Scooting: Maximal assistance; 2 Person assistance; Dependent/Total  Comment: MAX cues/tactile assist for hand placement on bed rail, proper rolling and initiating all movements, pursed lip breathing, use of BUE's to assist with scoot to EOB and awareness/assist with lines to increase overall safety.   Transfers  Stand Pivot Transfers: Dependent/Total; Maximum assistance; 2 Person assistance (with use of jessica stedy and max assist of 2 bed to recliner provided by therapy)  Sit to stand: 2 Person assistance; Maximum assistance (bed was elevated and use of jessica stedy)  Stand to sit: Maximum assistance; 2 Person assistance  Transfer Comments: MAX cues/tactile assist for B hand placement on jessica stedy as well as reaching back, safety features of lift, upright posture and weight shifting, pursed lip breathing, controlled stand to sit to increase safety/reduce falls. Cognition  Overall Cognitive Status: Exceptions  Arousal/Alertness: Delayed responses to stimuli  Following Commands: Inconsistently follows commands; Follows one step commands with repetition; Follows one step commands with increased time  Attention Span: Difficulty attending to directions; Difficulty dividing attention;  Attends with cues to redirect  Memory: Decreased short term memory; Decreased recall of recent events; Decreased long term memory; Decreased recall of precautions; Decreased recall of biographical Information  Safety Judgement: Decreased awareness of need for assistance; Decreased awareness of need for safety  Problem Solving: Assistance required to generate solutions; Assistance required to implement solutions; Assistance required to correct errors made; Assistance required to identify errors made; Decreased awareness of errors  Insights: Not aware of deficits  Initiation: Requires cues for all  Sequencing: Requires cues for all  Perception  Overall Perceptual Status: Impaired     Sensation  Overall Sensation Status: Impaired (pt reports intermittent paresthesias in hands/feet)        LUE AROM (degrees)  LUE AROM : WFL  RUE AROM (degrees)  RUE AROM : WFL  LUE Strength  Gross LUE Strength: Exceptions to [facility]  LUE Strength Comment: BUE strength grossly 4/5  RUE Strength  Gross RUE Strength: Exceptions to [address] [place] [address]  Times per week: 4-5x/week 1x/day as neel  Current Treatment Recommendations: Strengthening, Balance Training, Functional Mobility Training, Safety Education & Training, Positioning, Endurance Training, Neuromuscular Re-education, Patient/Caregiver Education & Training, Self-Care / ADL, Equipment Evaluation, Education, & procurement, Cognitive Reorientation, Pain Management, Home Management Training, Cognitive/Perceptual Training                                                  AM-PAC Score   11    *Co-treatment with PT warranted secondary to decreased safety and independence requiring 2 skilled therapy professionals to address individual discipline's goals. OT addressing preparation for ADL transfer, sitting and standing balance for increased ADL performance, sitting/activity tolerance, functional reaching, environmental safety/scanning, fall prevention, proper bed mob, ability to sequence and follow directions and functional UE strength. Goals  Short term goals  Time Frame for Short term goals: by discharge, pt demo  Short term goal 1: bed mob tasks with use of rail as needed to max assist of 1. Short term goal 2: increase BUE strength by a 1/2 grade to assist with self care tasks. Short term goal 3: UB ADL to set up/min assist and LB ADL to max assist of 1 supine in bed and with use of rail as needed. Short term goal 4: postural control to EOB and min assist neel > 20 mins as needed to increase core strength/reduce falls with self care. Short term goal 5: ADL transfers with use of AD/lift as needed to max assist of 1 (add functional mob to POC as able). Long term goals  Long term goal 1: Pt to stand with mod assist of 1 neel > 4 mins as able to reduce falls in function. Long term goal 2: Pt to complete toileting tasks with use of BSC/AD and mod assist of 1. Long term goal 3: Caregivers to be I with pressure relief, BUE HEP, EC/WS and fall prevention tech as well as DME/AE and AD recommendations with use of handouts.   Patient Goals   Patient goals : pt unable to state due to cognitive deficits       Therapy Time   Individual Concurrent Group Co-treatment   Time In 0749 (plus 10 min chart review/nursing communication)         Time Out 0847         Minutes 58=68 mins          Timed Code Treatment Minutes: CRIS Bennett

## 2021-11-22 NOTE — CARE COORDINATION
[facility] is out of network. Discussed with son other SNF that accept dementia. Referral sent to [facility] [place], Catie Souza [facility] of [address].  Elvira Asp

## 2021-11-22 NOTE — CARE COORDINATION
[facility] [facility] will not accept [employer]. [facility] did not approve patient for admission. [facility] will have bed for patient for admission. Discussed with son. He is agreeable with [facility]. Patient will need to be off the one on one for 24 hours.  Louis Marr

## 2021-11-22 NOTE — PLAN OF CARE
Problem: Skin Integrity:  Goal: Will show no infection signs and symptoms  Description: Will show no infection signs and symptoms  11/22/2021 0914 by Karlo Branch RN  Outcome: Ongoing  11/22/2021 0010 by Coleman Clements RN  Outcome: Ongoing  Note: Checked for incontinence every 2 hours and prn. Pericare as needed. Assisted to reposition off back frequently. On waffle mattress. Heels off bed with pillows. Goal: Absence of new skin breakdown  Description: Absence of new skin breakdown  11/22/2021 0914 by Karlo Branch RN  Outcome: Ongoing  11/22/2021 0010 by Coleman Clements RN  Outcome: Ongoing     Problem: Falls - Risk of:  Goal: Will remain free from falls  Description: Will remain free from falls  11/22/2021 0914 by Karlo Branch RN  Outcome: Ongoing  11/22/2021 0010 by Coleman Clements RN  Outcome: Ongoing  Note: Siderails up x 2  Hourly rounding  Call light in reach  Instructed to call for assist before attempting out of bed.   Remains free from falls and accidental injury at this time   Floor free from obstacles  Bed is locked and in lowest position  Adequate lighting provided  Bed alarm on, [place]alling star and Stay with Me signs posted  Patient is also on a continuous 1:1        Goal: Absence of physical injury  Description: Absence of physical injury  11/22/2021 0914 by Karlo Branch RN  Outcome: Ongoing  11/22/2021 0010 by Coleman Clements RN  Outcome: Ongoing     Problem: SAFETY  Goal: Free from accidental physical injury  11/22/2021 0914 by Karlo Branch RN  Outcome: Ongoing  11/22/2021 0010 by Coleman Clements RN  Outcome: Ongoing     Problem: DAILY CARE  Goal: Daily care needs are met  11/22/2021 0914 by Karlo Branch RN  Outcome: Ongoing  11/22/2021 0010 by Coleman Clements RN  Outcome: Ongoing     Problem: PAIN  Goal: Patient's pain/discomfort is manageable  11/22/2021 0914 by Karlo Branch RN  Outcome: Ongoing  11/22/2021 0010 by Tressa Rodriguez RN  Outcome: Ongoing     Problem: SKIN INTEGRITY  Goal: Skin integrity is maintained or improved  11/22/2021 0914 by Pina Babcock RN  Outcome: Ongoing  11/22/2021 0010 by Tressa Rodriguez RN  Outcome: Ongoing     Problem: KNOWLEDGE DEFICIT  Goal: Patient/S.O. demonstrates understanding of disease process, treatment plan, medications, and discharge instructions.   11/22/2021 0914 by Pina Babcock RN  Outcome: Ongoing  11/22/2021 0010 by Tressa Rodriguez RN  Outcome: Ongoing     Problem: DISCHARGE BARRIERS  Goal: Patient's continuum of care needs are met  11/22/2021 0914 by Pina Babcock RN  Outcome: Ongoing  11/22/2021 0010 by Tressa Rodriguez RN  Outcome: Ongoing     Problem: Confusion - Acute:  Goal: Absence of continued neurological deterioration signs and symptoms  Description: Absence of continued neurological deterioration signs and symptoms  11/22/2021 0914 by Pina Babcock RN  Outcome: Ongoing  11/22/2021 0010 by Tressa Rodriguez RN  Outcome: Ongoing  Goal: Mental status will be restored to baseline  Description: Mental status will be restored to baseline  11/22/2021 0914 by Pina Babcock RN  Outcome: Ongoing  11/22/2021 0010 by Tressa Rodriguez RN  Outcome: Ongoing     Problem: Discharge Planning:  Goal: Ability to perform activities of daily living will improve  Description: Ability to perform activities of daily living will improve  11/22/2021 0914 by Pina Babcock RN  Outcome: Ongoing  11/22/2021 0010 by Tressa Rodriguez RN  Outcome: Ongoing  Goal: Participates in care planning  Description: Participates in care planning  11/22/2021 0914 by Pina Babcock RN  Outcome: Ongoing  11/22/2021 0010 by Tressa Rodriguez RN  Outcome: Ongoing     Problem: Injury - Risk of, Physical Injury:  Goal: Will remain free from falls  Description: Will remain free from falls  11/22/2021 0914 by Pina Babcock RN  Outcome: Ongoing  11/22/2021 0010 by Ramesh Sanchez RN  Outcome: Ongoing  Note: Siderails up x 2  Hourly rounding  Call light in reach  Instructed to call for assist before attempting out of bed.   Remains free from falls and accidental injury at this time   Floor free from obstacles  Bed is locked and in lowest position  Adequate lighting provided  Bed alarm on, [place] star and Stay with Me signs posted  Patient is also on a continuous 1:1        Goal: Absence of physical injury  Description: Absence of physical injury  11/22/2021 0914 by Josselin Sanchez RN  Outcome: Ongoing  11/22/2021 0010 by Ramesh Sanchez RN  Outcome: Ongoing     Problem: Mood - Altered:  Goal: Mood stable  Description: Mood stable  11/22/2021 0914 by Josselin Sanchez RN  Outcome: Ongoing  11/22/2021 0010 by Ramesh Sanchez RN  Outcome: Ongoing  Goal: Absence of abusive behavior  Description: Absence of abusive behavior  11/22/2021 0914 by Josselin Sanchez RN  Outcome: Ongoing  11/22/2021 0010 by Ramesh Sanchez RN  Outcome: Ongoing  Goal: Verbalizations of feeling emotionally comfortable while being cared for will increase  Description: Verbalizations of feeling emotionally comfortable while being cared for will increase  11/22/2021 0914 by Josselin Sanchez RN  Outcome: Ongoing  11/22/2021 0010 by Ramesh Sanchez RN  Outcome: Ongoing     Problem: Psychomotor Activity - Altered:  Goal: Absence of psychomotor disturbance signs and symptoms  Description: Absence of psychomotor disturbance signs and symptoms  11/22/2021 0914 by Josselin Sanchez RN  Outcome: Ongoing  11/22/2021 0010 by Ramesh Sanchez RN  Outcome: Ongoing     Problem: Sensory Perception - Impaired:  Goal: Demonstrations of improved sensory functioning will increase  Description: Demonstrations of improved sensory functioning will increase  11/22/2021 0914 by Josselin Sanchez RN  Outcome: Ongoing  11/22/2021 0010 by Tressa Rodriguez RN  Outcome: Ongoing  Goal: Decrease in sensory misperception frequency  Description: Decrease in sensory misperception frequency  11/22/2021 0914 by Pina Babcock RN  Outcome: Ongoing  11/22/2021 0010 by Tressa Rodriguez RN  Outcome: Ongoing  Goal: Able to refrain from responding to false sensory perceptions  Description: Able to refrain from responding to false sensory perceptions  11/22/2021 0914 by Pina Babcock RN  Outcome: Ongoing  11/22/2021 0010 by Tressa Rodriguez RN  Outcome: Ongoing  Goal: Demonstrates accurate environmental perceptions  Description: Demonstrates accurate environmental perceptions  11/22/2021 0914 by Pina Babcock RN  Outcome: Ongoing  11/22/2021 0010 by Tressa Rodriguez RN  Outcome: Ongoing  Goal: Able to distinguish between reality-based and nonreality-based thinking  Description: Able to distinguish between reality-based and nonreality-based thinking  11/22/2021 0914 by Pina Babcock RN  Outcome: Ongoing  11/22/2021 0010 by Tressa Rodriguez RN  Outcome: Ongoing  Goal: Able to interrupt nonreality-based thinking  Description: Able to interrupt nonreality-based thinking  11/22/2021 0914 by Pina Babcock RN  Outcome: Ongoing  11/22/2021 0010 by Tressa Rodriguez RN  Outcome: Ongoing     Problem: Pain:  Goal: Pain level will decrease  Description: Pain level will decrease  Outcome: Ongoing  Goal: Control of acute pain  Description: Control of acute pain  Outcome: Ongoing  Goal: Control of chronic pain  Description: Control of chronic pain  Outcome: Ongoing     Problem: Tissue Perfusion - Cardiopulmonary, Altered:  Goal: Absence of angina  Description: Absence of angina  Outcome: Ongoing

## 2021-11-23 LAB
ABSOLUTE EOS #: 0.13 K/UL (ref 0–0.44)
ABSOLUTE IMMATURE GRANULOCYTE: 0.04 K/UL (ref 0–0.3)
ABSOLUTE LYMPH #: 0.84 K/UL (ref 1.1–3.7)
ABSOLUTE MONO #: 1.19 K/UL (ref 0.1–1.2)
ANION GAP SERPL CALCULATED.3IONS-SCNC: 8 MMOL/L (ref 9–17)
BASOPHILS # BLD: 0 % (ref 0–2)
BASOPHILS ABSOLUTE: <0.03 K/UL (ref 0–0.2)
BUN BLDV-MCNC: 17 MG/DL (ref 8–23)
BUN/CREAT BLD: 25 (ref 9–20)
CALCIUM SERPL-MCNC: 8.6 MG/DL (ref 8.6–10.4)
CHLORIDE BLD-SCNC: 103 MMOL/L (ref 98–107)
CO2: 28 MMOL/L (ref 20–31)
CREAT SERPL-MCNC: 0.67 MG/DL (ref 0.5–0.9)
DIFFERENTIAL TYPE: ABNORMAL
EOSINOPHILS RELATIVE PERCENT: 2 % (ref 1–4)
GFR AFRICAN AMERICAN: >60 ML/MIN
GFR NON-AFRICAN AMERICAN: >60 ML/MIN
GFR SERPL CREATININE-BSD FRML MDRD: ABNORMAL ML/MIN/{1.73_M2}
GFR SERPL CREATININE-BSD FRML MDRD: ABNORMAL ML/MIN/{1.73_M2}
GLUCOSE BLD-MCNC: 99 MG/DL (ref 70–99)
HCT VFR BLD CALC: 32.2 % (ref 36.3–47.1)
HEMOGLOBIN: 10 G/DL (ref 11.9–15.1)
IMMATURE GRANULOCYTES: 0 %
LYMPHOCYTES # BLD: 9 % (ref 24–43)
MCH RBC QN AUTO: 31.3 PG (ref 25.2–33.5)
MCHC RBC AUTO-ENTMCNC: 31.1 G/DL (ref 28.4–34.8)
MCV RBC AUTO: 100.9 FL (ref 82.6–102.9)
MONOCYTES # BLD: 13 % (ref 3–12)
NRBC AUTOMATED: 0 PER 100 WBC
PDW BLD-RTO: 14.3 % (ref 11.8–14.4)
PLATELET # BLD: 260 K/UL (ref 138–453)
PLATELET ESTIMATE: ABNORMAL
PMV BLD AUTO: 9.4 FL (ref 8.1–13.5)
POTASSIUM SERPL-SCNC: 4 MMOL/L (ref 3.7–5.3)
RBC # BLD: 3.19 M/UL (ref 3.95–5.11)
RBC # BLD: ABNORMAL 10*6/UL
SEG NEUTROPHILS: 76 % (ref 36–65)
SEGMENTED NEUTROPHILS ABSOLUTE COUNT: 6.74 K/UL (ref 1.5–8.1)
SODIUM BLD-SCNC: 139 MMOL/L (ref 135–144)
WBC # BLD: 9 K/UL (ref 3.5–11.3)
WBC # BLD: ABNORMAL 10*3/UL

## 2021-11-23 PROCEDURE — 99221 1ST HOSP IP/OBS SF/LOW 40: CPT | Performed by: PSYCHIATRY & NEUROLOGY

## 2021-11-23 PROCEDURE — 2580000003 HC RX 258: Performed by: ORTHOPAEDIC SURGERY

## 2021-11-23 PROCEDURE — 1200000000 HC SEMI PRIVATE

## 2021-11-23 PROCEDURE — 97535 SELF CARE MNGMENT TRAINING: CPT

## 2021-11-23 PROCEDURE — 6370000000 HC RX 637 (ALT 250 FOR IP): Performed by: ORTHOPAEDIC SURGERY

## 2021-11-23 PROCEDURE — 6360000002 HC RX W HCPCS: Performed by: ORTHOPAEDIC SURGERY

## 2021-11-23 PROCEDURE — 6370000000 HC RX 637 (ALT 250 FOR IP): Performed by: PSYCHIATRY & NEUROLOGY

## 2021-11-23 PROCEDURE — 97530 THERAPEUTIC ACTIVITIES: CPT

## 2021-11-23 PROCEDURE — 80048 BASIC METABOLIC PNL TOTAL CA: CPT

## 2021-11-23 PROCEDURE — 36415 COLL VENOUS BLD VENIPUNCTURE: CPT

## 2021-11-23 PROCEDURE — 85025 COMPLETE CBC W/AUTO DIFF WBC: CPT

## 2021-11-23 RX ORDER — QUETIAPINE FUMARATE 25 MG/1
50 TABLET, FILM COATED ORAL NIGHTLY
Status: DISCONTINUED | OUTPATIENT
Start: 2021-11-23 | End: 2021-11-28 | Stop reason: HOSPADM

## 2021-11-23 RX ORDER — TIMOLOL MALEATE 5 MG/ML
1 SOLUTION/ DROPS OPHTHALMIC 2 TIMES DAILY
COMMUNITY

## 2021-11-23 RX ORDER — TRAMADOL HYDROCHLORIDE 50 MG/1
50 TABLET ORAL EVERY 8 HOURS PRN
Status: ON HOLD | COMMUNITY
End: 2021-11-28 | Stop reason: HOSPADM

## 2021-11-23 RX ORDER — NITROFURANTOIN 25; 75 MG/1; MG/1
100 CAPSULE ORAL NIGHTLY
Status: ON HOLD | COMMUNITY
End: 2021-11-28 | Stop reason: HOSPADM

## 2021-11-23 RX ORDER — SIMVASTATIN 40 MG
40 TABLET ORAL NIGHTLY
COMMUNITY

## 2021-11-23 RX ORDER — GABAPENTIN 100 MG/1
100 CAPSULE ORAL 3 TIMES DAILY
COMMUNITY

## 2021-11-23 RX ORDER — POLYETHYLENE GLYCOL 3350 17 G/17G
17 POWDER, FOR SOLUTION ORAL DAILY
COMMUNITY

## 2021-11-23 RX ADMIN — ENOXAPARIN SODIUM 40 MG: 100 INJECTION SUBCUTANEOUS at 08:43

## 2021-11-23 RX ADMIN — QUETIAPINE FUMARATE 50 MG: 25 TABLET ORAL at 20:29

## 2021-11-23 RX ADMIN — DEXTROSE AND SODIUM CHLORIDE: 5; 450 INJECTION, SOLUTION INTRAVENOUS at 08:41

## 2021-11-23 RX ADMIN — HYDROCODONE BITARTRATE AND ACETAMINOPHEN 1 TABLET: 5; 325 TABLET ORAL at 04:20

## 2021-11-23 RX ADMIN — CITALOPRAM HYDROBROMIDE 10 MG: 10 TABLET ORAL at 08:42

## 2021-11-23 ASSESSMENT — PAIN SCALES - GENERAL
PAINLEVEL_OUTOF10: 8
PAINLEVEL_OUTOF10: 5

## 2021-11-23 ASSESSMENT — PAIN DESCRIPTION - LOCATION: LOCATION: HIP

## 2021-11-23 ASSESSMENT — PAIN DESCRIPTION - PAIN TYPE: TYPE: SURGICAL PAIN

## 2021-11-23 ASSESSMENT — PAIN DESCRIPTION - ORIENTATION: ORIENTATION: LEFT

## 2021-11-23 NOTE — CONSULTS
Neurology Consult Note        Reason for Consult:  Dementia/agitation  Requesting Physician:  Dr Lam Hum:  agitation    History Obtained From:  electronic medical record       HISTORY OF PRESENT ILLNESS:    This is an 80 y/o WF with known Alzheimer's dementia who was admitted for management of a hip fracture s/p and unwitnessed fall. She normally resides at a nursing home. Following admission and surgical intervention she has been agitated and requires a 1:1 sitter. She has been receiving hydrocodone approximately 2-3x/day since then as well as some ativan. She is unable to give any further clinical information. The sitter at the bedside reports that the patient requires frequent redirection and keeps wanting to stand up and was also very restless and not sleeping well last night.           Past Medical History:        Diagnosis Date    Dementia ([address]     Hypertension      Past Surgical History:        Procedure Laterality Date    APPENDECTOMY      HIP SURGERY Left 11/21/2021    H[facility] [address] [facility]       Current Medications:   Current Facility-Administered Medications: QUEtiapine (SEROQUEL) tablet 50 mg, 50 mg, Oral, Nightly  HYDROcodone-acetaminophen (NORCO) 5-325 MG per tablet 1 tablet, 1 tablet, Oral, Q6H PRN  HYDROcodone-acetaminophen (NORCO) 5-325 MG per tablet 2 tablet, 2 tablet, Oral, Q6H PRN  morphine (PF) injection 2 mg, 2 mg, IntraVENous, Q4H PRN  dextrose 5 % and 0.45 % sodium chloride infusion, , IntraVENous, Continuous  donepezil (ARICEPT) tablet 5 mg, 5 mg, Oral, Nightly  citalopram (CELEXA) tablet 10 mg, 10 mg, Oral, Daily  sodium chloride flush 0.9 % injection 10 mL, 10 mL, IntraVENous, 2 times per day  sodium chloride flush 0.9 % injection 10 mL, 10 mL, IntraVENous, PRN  0.9 % sodium chloride infusion, 25 mL, IntraVENous, PRN  ondansetron (ZOFRAN-ODT) disintegrating tablet 4 mg, 4 mg, Oral, Q8H PRN **OR** ondansetron (ZOFRAN) injection 4 mg, 4 mg, IntraVENous, Q6H PRN  magnesium hydroxide (MILK OF MAGNESIA) 400 MG/5ML suspension 30 mL, 30 mL, Oral, Daily PRN  acetaminophen (TYLENOL) tablet 650 mg, 650 mg, Oral, Q6H PRN **OR** acetaminophen (TYLENOL) suppository 650 mg, 650 mg, Rectal, Q6H PRN  potassium chloride (KLOR-CON M) extended release tablet 40 mEq, 40 mEq, Oral, PRN **OR** potassium bicarb-citric acid (EFFER-K) effervescent tablet 40 mEq, 40 mEq, Oral, PRN **OR** potassium chloride 10 mEq/100 mL IVPB (Peripheral Line), 10 mEq, IntraVENous, PRN  enoxaparin (LOVENOX) injection 40 mg, 40 mg, SubCUTAneous, Daily  Allergies:  Patient has no known allergies. Social History:  TOBACCO:   reports that she has never smoked. She has never used smokeless tobacco.  ETOH:   reports current alcohol use of about 1.0 standard drink of alcohol per week. DRUGS:   reports no history of drug use. Family History:       Problem Relation Age of Onset    No Known Problems Mother     Heart Disease Father        REVIEW OF SYSTEMS:  Review of systems not obtained due to patient factors - mental status    PHYSICAL EXAM:    Vitals:  BP (!) 130/59   Pulse 99   Temp 98 °F (36.7 °C) (Oral)   Resp 16   Ht 5' 6\" (1.676 m)   Wt 109 lb (49.4 kg)   SpO2 96%   BMI 17.59 kg/m²      General Exam:  Normal body habitus, no acute distress    HEENT:  Normocephalic, atraumatic  Eyes: conjunctiva non-injected, sclera anicteric  Mucous membranes of normal color and hydration status      Neurologic exam:     Mental status: alert and oriented to person only. SHe has very poor attention and poor memory  No overt visuospatial neglect or gaze preference  Language with normal fluency and comprehension to simple commands. Cranial nerves:  Pupils equal round and reactive to light, no eyelid ptosis  Extraocular movements: intact and full.   Face is symmetric to movement and sensation  Hearing is intact to conversation  Tongue midline, no dysarthria or dysphonia    Motor exam:  Moves limbs symmetrically  No abnormal movements      DATA  LABS:  General Labs:    CBC:   Lab Results   Component Value Date    WBC 9.0 11/23/2021    RBC 3.19 11/23/2021    HGB 10.0 11/23/2021    HCT 32.2 11/23/2021    .9 11/23/2021    MCH 31.3 11/23/2021    MCHC 31.1 11/23/2021    RDW 14.3 11/23/2021     11/23/2021    MPV 9.4 11/23/2021     Radiology Review:  I have reviewed radiology image(s) of:  CT of the Brain:  No acute findings        IMPRESSION:   This is an 82 y/o WF with Alzheimer's dementia who is admitted for management of a hip fracture. Her agitation is likely due to a combination of her dementia with some contributions from environmental issues and medications. Would recommend reducing use of narcotic and other centrally acting medications especially during the day. SHe does not appear to be having too much pain. Will add night time seroquel to try an encourage a normal sleep schedule. RECOMMENDATIONS:   1. Avoid ativan and reduce narcotic exposure as much as possible  2. seroquel 50mg qhs  3. Attempt to keep awake and out of bed during day and in bed at night              Kirby Douglass. Tj Lemus M.D.   Clinical Neurophysiologist  Neuromuscular Medicine

## 2021-11-23 NOTE — PROGRESS NOTES
Occupational Therapy  Facility/Department: [facility]  Daily Treatment Note  NAME: Mildred Graff  : 1935  MRN: [id number]    Date of Service: 2021    Discharge Recommendations:  Patient would benefit from continued therapy after discharge   Pt currently functioning below baseline. Would suggest additional therapy at time of discharge to maximize long term outcomes and prevent re-admission. Please refer to AM-PAC score for current level of function. Assessment   Performance deficits / Impairments: Decreased functional mobility ; Decreased safe awareness; Decreased balance; Decreased coordination; Decreased ADL status; Decreased cognition; Decreased vision/visual deficit; Decreased posture; Decreased endurance; Decreased high-level IADLs; Decreased strength; Decreased fine motor control  Assessment: At this time, pt is a high fall risk with 2 staff assist required when up and use of jessica stedy. Pt is limited by cognitive deficits and pain. Skilled OT is indicated for maximizing functional I and safety as able to reduce caregiver assist/burden. Prognosis: Poor; Fair  REQUIRES OT FOLLOW UP: Yes  Activity Tolerance  Activity Tolerance: Patient limited by fatigue; Treatment limited secondary to decreased cognition; Patient limited by pain  Activity Tolerance: poor  Safety Devices  Safety Devices in place: Yes  Type of devices: All fall risk precautions in place; Left in bed; Bed alarm in place; Call light within reach; Nurse notified; Gait belt; Patient at risk for falls         Patient Diagnosis(es): The encounter diagnosis was Closed fracture of neck of left femur, initial encounter ([facility] [address] has a past medical history of Dementia ([facility] [address] and Hypertension. has a past surgical history that includes Tubal ligation; Appendectomy; and hip surgery (Left, 2021).     Restrictions  Restrictions/Precautions  Restrictions/Precautions: General Precautions, Fall Risk, Surgical Protocols  Required deficits. Cognition  Overall Cognitive Status: Exceptions  Arousal/Alertness: Delayed responses to stimuli; Inconsistent responses to stimuli  Following Commands: Inconsistently follows commands; Follows one step commands with repetition; Follows one step commands with increased time; Does not follow commands  Attention Span: Difficulty attending to directions; Difficulty dividing attention;  Attends with cues to redirect  Memory: Decreased short term memory; Decreased recall of recent events; Decreased long term memory; Decreased recall of precautions; Decreased recall of biographical Information  Safety Judgement: Decreased awareness of need for assistance; Decreased awareness of need for safety  Problem Solving: Assistance required to generate solutions; Assistance required to implement solutions; Assistance required to correct errors made; Assistance required to identify errors made; Decreased awareness of errors  Insights: Not aware of deficits  Initiation: Requires cues for all  Sequencing: Requires cues for all     Perception  Overall Perceptual Status: Impaired  Unilateral Attention: Cues to maintain midline in sitting; Cues to maintain midline in standing  Initiation: Hand over hand to initiate tasks                                   Plan   Plan  Times per week: 4-5x/week 1x/day as neel  Current Treatment Recommendations: Strengthening, Balance Training, Functional Mobility Training, Safety Education & Training, Positioning, Endurance Training, Neuromuscular Re-education, Patient/Caregiver Education & Training, Self-Care / ADL, Equipment Evaluation, Education, & procurement, Cognitive Reorientation, Pain Management, Home Management Training, Cognitive/Perceptual Training                        AM-PAC Score        AM-PAC Inpatient Daily Activity Raw Score: 8 (11/23/21 1330)  AM-PAC Inpatient ADL T-Scale Score : 22.86 (11/23/21 1330)  ADL Inpatient CMS 0-100% Score: 85.69 (11/23/21 1330)  ADL Inpatient CMS G-Code Modifier : CM (11/23/21 8970)    Goals  Short term goals  Time Frame for Short term goals: by discharge, pt demo  Short term goal 1: bed mob tasks with use of rail as needed to max assist of 1. Short term goal 2: increase BUE strength by a 1/2 grade to assist with self care tasks. Short term goal 3: UB ADL to set up/min assist and LB ADL to max assist of 1 supine in bed and with use of rail as needed. Short term goal 4: postural control to EOB and min assist neel > 20 mins as needed to increase core strength/reduce falls with self care. Short term goal 5: ADL transfers with use of AD/lift as needed to max assist of 1 (add functional mob to POC as able). Long term goals  Long term goal 1: Pt to stand with mod assist of 1 neel > 4 mins as able to reduce falls in function. Long term goal 2: Pt to complete toileting tasks with use of BSC/AD and mod assist of 1. Long term goal 3: Caregivers to be I with pressure relief, BUE HEP, EC/WS and fall prevention tech as well as DME/AE and AD recommendations with use of handouts. Patient Goals   Patient goals : pt unable to state due to cognitive deficits       Therapy Time   Individual Concurrent Co treat Co-treatment   Time In     [id number]   Time Out     0920 5   Minutes     16 15        Co-treatment with PT warranted secondary to decreased safety and independence requiring 2 skilled therapy professionals to address individual discipline's goals. OT addressing \"preparation for ADL transfer\",\"sitting balance for increased ADL performance\",\"sitting/activity tolerance\",\"functional reaching\",\"environmental safety/scanning\",\"fall prevention\",\"functional mobility for ADL transfers\",\"ability to sequence and follow directions\",\"functional UE strength\".       CLAUDETTE Corral

## 2021-11-23 NOTE — PROGRESS NOTES
Postop Note    Patient:  Sloan Hernandez  YOB: 1935     81 y.o. female      Subjective:  Patient seen and examined in the hospital. Pain controlled.        Objective:   Vitals:    11/23/21 1548   BP: (!) 156/72   Pulse: 95   Resp: 16   Temp: 98.2 °F (36.8 °C)   SpO2:      Gen: NAD, awake  MSK:  Dressing/splint in place, clean/dry/intact  Grossly NVI  -Able to wiggle toes and Able to plantarflex/dorsiflex ankle appropriately, with intact sensation throughout      Impression/plan:   81 y.o. female 2 Days Post-Op, doing well overall      -WB'ing status: WBAT; posterior hip precautions x3 months  -Pain control:    -signed pain medicine prescription in chart  -DVT ppx:  Early mobilization  -signed DVT ppx medicine prescription in chart  -Dispo:   -per PT recommendations, but ok to discharge from an Ortho perspective  -Ortho to sign off, please page with any questions

## 2021-11-23 NOTE — PROGRESS NOTES
Transitions of Care Pharmacy Service   Medication Review    The patient's list of current home medications has been reviewed and updated. Source(s) of information: med list from facility [facility]), Surescripts refill report    Based on information provided by the above source(s), I have updated the patient's home med list as described below. Please review the ACTION REQUESTED section of this note below for any discrepancies on current hospital orders. I changed or updated the following medications on the patient's home medication list:  Removed Clonidine  Tylenol  Donepezil     Added Gabapentin  Macrobid (takes nightly)  Miralax  Zocor  Timoptic eye drops  Tramadol         PROVIDER ACTION REQUESTED  Medications that need to be addressed by a physician/nurse practitioner:    Medication Action Requested   Donepezil     Last filled in Aug 2021 (30-day supply) but it is not listed as a current med at the facility -- please review and discontinue as appropriate, otherwise she will need new orders to continue at discharge      Meds added to list need physician review            Please feel free to call me with any questions about this encounter. Thank you. Ivonne Anderson, [facility]   Transitions of Care Pharmacy Service  Phone:  [phone]  Fax: [phone]      Electronically signed by Ivonne Anderson [facility] on 11/23/2021 at 5:38 PM           Medications Prior to Admission:   gabapentin (NEURONTIN) 100 MG capsule, Take 100 mg by mouth 3 times daily. nitrofurantoin, macrocrystal-monohydrate, (MACROBID) 100 MG capsule, Take 100 mg by mouth nightly  polyethylene glycol (GLYCOLAX) 17 g packet, Take 17 g by mouth daily  simvastatin (ZOCOR) 40 MG tablet, Take 40 mg by mouth nightly  timolol (TIMOPTIC) 0.5 % ophthalmic solution, Place 1 drop into both eyes 2 times daily  traMADol (ULTRAM) 50 MG tablet, Take 50 mg by mouth every 8 hours as needed for Pain.   citalopram (CELEXA) 10 MG tablet, Take 10 mg by mouth daily

## 2021-11-23 NOTE — PROGRESS NOTES
Physical Therapy  Facility/Department: [facility] MED SURG  Daily Treatment Note  NAME: Giovana Smith  : 1935  MRN: [id number]    Date of Service: 2021    Discharge Recommendations:  Patient would benefit from continued therapy after discharge        Assessment   Body structures, Functions, Activity limitations: Decreased functional mobility ; Decreased cognition; Decreased posture; Decreased ADL status; Decreased endurance; Decreased ROM; Decreased strength  Assessment:  Pt required 2 assist for safe transfers, unable to safely ambulate & required 2 assist with [place] to mobilize to chair gait. With current deficits, Pt HIGH risk for falls & requires continued PT to maximize independence with functional mobility, balance, safety awareness & activity tolerance. Pt currently functioning below baseline. Would suggest additional therapy at time of discharge to maximize long term outcomes and prevent re-admission. Please refer to AM-PAC score for current level of function. Activity Tolerance  Activity Tolerance: Patient limited by fatigue; Patient limited by pain; Patient limited by cognitive status     Patient Diagnosis(es): The encounter diagnosis was Closed fracture of neck of left femur, [facility] has a past medical history of Dementia ([facility] and Hypertension. has a past surgical history that includes Tubal ligation; Appendectomy; and hip surgery (Left, 2021). Restrictions  Restrictions/Precautions  Restrictions/Precautions: General Precautions, Fall Risk, Surgical Protocols  Required Braces or Orthoses?: Yes (ABD pillow)  Position Activity Restriction  Hip Precautions: Posterior hip precautions, No hip flexion > 90 degrees, No ADduction, No hip internal rotation  Other position/activity restrictions: FWB, heels off bed at all times, ABD pillow, posterior hip precautions, confusion, alarms, RUE IV, one on one staff  Subjective   General  Chart Reviewed:  Yes  Additional Pertinent Hx: dementia, HTN  Subjective  Subjective: Pt agreeable to PT  General Comment  Comments: RN okays PT  Pain Screening  Patient Currently in Pain: Yes  Pain Assessment  Pain Assessment: 0-10  Pain Level: 8  Pain Type: Surgical pain  Pain Location: Hip  Pain Orientation: Left  Vital Signs  Patient Currently in Pain: Yes       Orientation     Cognition      Objective   Bed mobility  Scooting: Maximal assistance  Transfers  Sit to Stand: Maximum Assistance  Stand to sit: Maximum Assistance; 2 Person Assistance  Stand Pivot Transfers: Maximum Assistance; 2 Person Assistance  Comment: used jessica quintero to tranfer pt to and from bedside chair, pt tolerated sitting in bedside chair with 1:1 for several hours   Ambulation  Ambulation?: No     Balance  Posture: Fair  Sitting - Static: Poor  Sitting - Dynamic: Poor  Standing - Static: Poor; +  Exercises  Comments: pt unable to participate in ther ex due to pain levels                        G-Code     OutComes Score                                                     AM-PAC Score  AM-PAC Inpatient Mobility Raw Score : 8 (11/23/21 1423)  AM-PAC Inpatient T-Scale Score : 28.52 (11/23/21 1423)  Mobility Inpatient CMS 0-100% Score: 86.62 (11/23/21 1423)  Mobility Inpatient CMS G-Code Modifier : CM (11/23/21 1423)          Goals  Short term goals  Time Frame for Short term goals: 12 visits  Short term goal 1:  Inc bed mobility to [employer] term goal 2: Pt able to transfer from bed to chair with min assist using safest device  Short term goal 3: Pt to tolerate sitting EOB up to 30 minutes with UB/LB support to improve core stability & repositioning for pressure relief & prevent sedentary complications  Short term goal 4: Pt able to tolerate 30-40 min of activity to include ex, NMR & functional to facilitate activity tolerance to [address]  Times per week: 1-2x/D, 5-6D/week  Current Treatment Recommendations: Strengthening, ROM, Balance Training, Functional Mobility Training, Transfer Training, Endurance Training, ADL/Self-care Training, Gait Training, Safety Education & Training, Patient/Caregiver Education & Training, Equipment Evaluation, Education, & procurement  Safety Devices  Type of devices: Bed alarm in place, Call light within reach, Gait belt, Patient at risk for falls, Left in chair, Nurse notified     Returned to see pt from [phone] for assist back to bed using zurdo quintero      Therapy Time   [facility] Co-treatment   Time In  [id number]       Time Out  [id number]       Minutes  18  13             Co-treatment with OT warranted secondary to decreased safety and independence requiring 2 skilled therapy professionals to address individual discipline's goals. PT addressing preparation for  Transfers, gait and pre gait activities,  sitting balance for increased  sitting/activity tolerance, functional mobility, environmental safety/scanning, fall prevention, functional mobility  transfers and functional LE strength. Upon writer exit, call light within reach, pt retired to bed. All lines intact and patient positioned comfortably. All patient needs addressed prior to ending therapy session. Chart reviewed prior to treatment and patient is agreeable for therapy. RN reports patient is medically stable for therapy treatment this date.        ZURDO ENGLE, PTA

## 2021-11-23 NOTE — PLAN OF CARE
Problem: Skin Integrity:  Goal: Will show no infection signs and symptoms  Description: Will show no infection signs and symptoms  Outcome: Ongoing  Goal: Absence of new skin breakdown  Description: Absence of new skin breakdown  Outcome: Ongoing     Problem: Falls - Risk of:  Goal: Will remain free from falls  Description: Will remain free from falls  Outcome: Ongoing  Goal: Absence of physical injury  Description: Absence of physical injury  Outcome: Ongoing     Problem: SAFETY  Goal: Free from accidental physical injury  Outcome: Ongoing     Problem: DAILY CARE  Goal: Daily care needs are met  Outcome: Ongoing     Problem: PAIN  Goal: Patient's pain/discomfort is manageable  Outcome: Ongoing     Problem: SKIN INTEGRITY  Goal: Skin integrity is maintained or improved  Outcome: Ongoing     Problem: KNOWLEDGE DEFICIT  Goal: Patient/S.O. demonstrates understanding of disease process, treatment plan, medications, and discharge instructions.   Outcome: Ongoing     Problem: DISCHARGE BARRIERS  Goal: Patient's continuum of care needs are met  Outcome: Ongoing     Problem: Confusion - Acute:  Goal: Absence of continued neurological deterioration signs and symptoms  Description: Absence of continued neurological deterioration signs and symptoms  Outcome: Ongoing  Goal: Mental status will be restored to baseline  Description: Mental status will be restored to baseline  Outcome: Ongoing     Problem: Discharge Planning:  Goal: Ability to perform activities of daily living will improve  Description: Ability to perform activities of daily living will improve  Outcome: Ongoing  Goal: Participates in care planning  Description: Participates in care planning  Outcome: Ongoing     Problem: Injury - Risk of, Physical Injury:  Goal: Will remain free from falls  Description: Will remain free from falls  Outcome: Ongoing  Goal: Absence of physical injury  Description: Absence of physical injury  Outcome: Ongoing     Problem: Mood - Altered:  Goal: Mood stable  Description: Mood stable  Outcome: Ongoing  Goal: Absence of abusive behavior  Description: Absence of abusive behavior  Outcome: Ongoing  Goal: Verbalizations of feeling emotionally comfortable while being cared for will increase  Description: Verbalizations of feeling emotionally comfortable while being cared for will increase  Outcome: Ongoing     Problem: Psychomotor Activity - Altered:  Goal: Absence of psychomotor disturbance signs and symptoms  Description: Absence of psychomotor disturbance signs and symptoms  Outcome: Ongoing     Problem: Sensory Perception - Impaired:  Goal: Demonstrations of improved sensory functioning will increase  Description: Demonstrations of improved sensory functioning will increase  Outcome: Ongoing  Goal: Decrease in sensory misperception frequency  Description: Decrease in sensory misperception frequency  Outcome: Ongoing  Goal: Able to refrain from responding to false sensory perceptions  Description: Able to refrain from responding to false sensory perceptions  Outcome: Ongoing  Goal: Demonstrates accurate environmental perceptions  Description: Demonstrates accurate environmental perceptions  Outcome: Ongoing  Goal: Able to distinguish between reality-based and nonreality-based thinking  Description: Able to distinguish between reality-based and nonreality-based thinking  Outcome: Ongoing  Goal: Able to interrupt nonreality-based thinking  Description: Able to interrupt nonreality-based thinking  Outcome: Ongoing

## 2021-11-24 LAB
ABSOLUTE EOS #: 0.18 K/UL (ref 0–0.44)
ABSOLUTE IMMATURE GRANULOCYTE: 0.04 K/UL (ref 0–0.3)
ABSOLUTE LYMPH #: 1 K/UL (ref 1.1–3.7)
ABSOLUTE MONO #: 1.13 K/UL (ref 0.1–1.2)
ANION GAP SERPL CALCULATED.3IONS-SCNC: 9 MMOL/L (ref 9–17)
BASOPHILS # BLD: 0 % (ref 0–2)
BASOPHILS ABSOLUTE: 0.03 K/UL (ref 0–0.2)
BUN BLDV-MCNC: 14 MG/DL (ref 8–23)
BUN/CREAT BLD: 23 (ref 9–20)
CALCIUM SERPL-MCNC: 8.4 MG/DL (ref 8.6–10.4)
CHLORIDE BLD-SCNC: 105 MMOL/L (ref 98–107)
CO2: 25 MMOL/L (ref 20–31)
CREAT SERPL-MCNC: 0.62 MG/DL (ref 0.5–0.9)
DIFFERENTIAL TYPE: ABNORMAL
EOSINOPHILS RELATIVE PERCENT: 2 % (ref 1–4)
GFR AFRICAN AMERICAN: >60 ML/MIN
GFR NON-AFRICAN AMERICAN: >60 ML/MIN
GFR SERPL CREATININE-BSD FRML MDRD: ABNORMAL ML/MIN/{1.73_M2}
GFR SERPL CREATININE-BSD FRML MDRD: ABNORMAL ML/MIN/{1.73_M2}
GLUCOSE BLD-MCNC: 104 MG/DL (ref 70–99)
HCT VFR BLD CALC: 31.2 % (ref 36.3–47.1)
HEMOGLOBIN: 9.7 G/DL (ref 11.9–15.1)
IMMATURE GRANULOCYTES: 1 %
LYMPHOCYTES # BLD: 12 % (ref 24–43)
MCH RBC QN AUTO: 31.2 PG (ref 25.2–33.5)
MCHC RBC AUTO-ENTMCNC: 31.1 G/DL (ref 28.4–34.8)
MCV RBC AUTO: 100.3 FL (ref 82.6–102.9)
MONOCYTES # BLD: 14 % (ref 3–12)
NRBC AUTOMATED: 0 PER 100 WBC
PDW BLD-RTO: 14.1 % (ref 11.8–14.4)
PLATELET # BLD: 244 K/UL (ref 138–453)
PLATELET ESTIMATE: ABNORMAL
PMV BLD AUTO: 9.1 FL (ref 8.1–13.5)
POTASSIUM SERPL-SCNC: 3.8 MMOL/L (ref 3.7–5.3)
RBC # BLD: 3.11 M/UL (ref 3.95–5.11)
RBC # BLD: ABNORMAL 10*6/UL
SEG NEUTROPHILS: 71 % (ref 36–65)
SEGMENTED NEUTROPHILS ABSOLUTE COUNT: 5.85 K/UL (ref 1.5–8.1)
SODIUM BLD-SCNC: 139 MMOL/L (ref 135–144)
WBC # BLD: 8.2 K/UL (ref 3.5–11.3)
WBC # BLD: ABNORMAL 10*3/UL

## 2021-11-24 PROCEDURE — 6360000002 HC RX W HCPCS: Performed by: ORTHOPAEDIC SURGERY

## 2021-11-24 PROCEDURE — 97535 SELF CARE MNGMENT TRAINING: CPT

## 2021-11-24 PROCEDURE — 85025 COMPLETE CBC W/AUTO DIFF WBC: CPT

## 2021-11-24 PROCEDURE — 97530 THERAPEUTIC ACTIVITIES: CPT

## 2021-11-24 PROCEDURE — 2580000003 HC RX 258: Performed by: ORTHOPAEDIC SURGERY

## 2021-11-24 PROCEDURE — 36415 COLL VENOUS BLD VENIPUNCTURE: CPT

## 2021-11-24 PROCEDURE — 1200000000 HC SEMI PRIVATE

## 2021-11-24 PROCEDURE — 6370000000 HC RX 637 (ALT 250 FOR IP): Performed by: PSYCHIATRY & NEUROLOGY

## 2021-11-24 PROCEDURE — 6370000000 HC RX 637 (ALT 250 FOR IP): Performed by: ORTHOPAEDIC SURGERY

## 2021-11-24 PROCEDURE — 99232 SBSQ HOSP IP/OBS MODERATE 35: CPT | Performed by: PSYCHIATRY & NEUROLOGY

## 2021-11-24 PROCEDURE — 80048 BASIC METABOLIC PNL TOTAL CA: CPT

## 2021-11-24 RX ORDER — DONEPEZIL HYDROCHLORIDE 10 MG/1
10 TABLET, FILM COATED ORAL NIGHTLY
Status: DISCONTINUED | OUTPATIENT
Start: 2021-11-24 | End: 2021-11-28 | Stop reason: HOSPADM

## 2021-11-24 RX ADMIN — DONEPEZIL HYDROCHLORIDE 10 MG: 10 TABLET, FILM COATED ORAL at 21:09

## 2021-11-24 RX ADMIN — ENOXAPARIN SODIUM 40 MG: 100 INJECTION SUBCUTANEOUS at 12:53

## 2021-11-24 RX ADMIN — DEXTROSE AND SODIUM CHLORIDE: 5; 450 INJECTION, SOLUTION INTRAVENOUS at 21:32

## 2021-11-24 RX ADMIN — QUETIAPINE FUMARATE 50 MG: 25 TABLET ORAL at 21:09

## 2021-11-24 RX ADMIN — CITALOPRAM HYDROBROMIDE 10 MG: 10 TABLET ORAL at 12:53

## 2021-11-24 ASSESSMENT — PAIN DESCRIPTION - ORIENTATION: ORIENTATION: LEFT

## 2021-11-24 ASSESSMENT — PAIN SCALES - GENERAL: PAINLEVEL_OUTOF10: 8

## 2021-11-24 ASSESSMENT — PAIN DESCRIPTION - LOCATION: LOCATION: HIP

## 2021-11-24 NOTE — PROGRESS NOTES
Occupational Therapy  Facility/Department: [facility] MED SURG  Daily Treatment Note  NAME: Dolores Marquez  : 1935  MRN: [id number]    Date of Service: 2021    Discharge Recommendations:  Patient would benefit from continued therapy after discharge   Pt currently functioning below baseline. Would suggest additional therapy at time of discharge to maximize long term outcomes and prevent re-admission. Please refer to AM-PAC score for current level of function. Assessment   Performance deficits / Impairments: Decreased functional mobility ; Decreased safe awareness; Decreased balance; Decreased coordination; Decreased ADL status; Decreased cognition; Decreased vision/visual deficit; Decreased posture; Decreased endurance; Decreased high-level IADLs; Decreased strength; Decreased fine motor control  Assessment: At this time, pt is a high fall risk with 2 staff assist required when up and use of jessica stedy. Pt is limited by cognitive deficits and pain. Skilled OT is indicated for maximizing functional I and safety as able to reduce caregiver assist/burden. Prognosis: Poor  REQUIRES OT FOLLOW UP: Yes  Activity Tolerance  Activity Tolerance: Patient limited by fatigue; Treatment limited secondary to decreased cognition; Patient limited by pain  Activity Tolerance: poor  Safety Devices  Safety Devices in place: Yes  Type of devices: All fall risk precautions in place; Left in chair; Nurse notified; Call light within reach; Gait belt; Patient at risk for falls (2 visitors present at writer's exit)         Patient Diagnosis(es): The encounter diagnosis was Closed fracture of neck of left femur, initial encounter ([facility] has a past medical history of Dementia ([facility] and Hypertension. has a past surgical history that includes Tubal ligation; Appendectomy; and hip surgery (Left, 2021).     Restrictions  Restrictions/Precautions  Restrictions/Precautions: General Precautions, Fall Risk, Surgical Protocols  Required Braces or Orthoses?: Yes (ABD pillow)  Position Activity Restriction  Hip Precautions: Posterior hip precautions, No hip flexion > 90 degrees, No ADduction, No hip internal rotation  Other position/activity restrictions: FWB, heels off bed at all times, ABD pillow, posterior hip precautions, confusion, alarms, RUE IV, one on one staff  Subjective   General  Chart Reviewed: Yes  Patient assessed for rehabilitation services?: Yes  Response to previous treatment: Patient with no complaints from previous session  Family / Caregiver Present: Yes (son)  Subjective  Subjective: Pt confused and in a lot of pain with movement. Orientation  Orientation  Overall Orientation Status: Impaired  Orientation Level: Oriented to person; Disoriented to place; Disoriented to time; Disoriented to situation  Objective             Balance  Sitting Balance: Minimal assistance  Standing Balance: Dependent/Total (MAX/DEP x2 in jessica stedy)  Functional Mobility  Functional Mobility Comments: N/T and not safe or appropriate at this time; jessica stedy used for transfers  Bed mobility  Supine to Sit: Dependent/Total; 2 Person assistance  Scooting: Dependent/Total; 2 Person assistance  Comment: Pt limited by cognitive deficits. Transfers  Sit to stand: Maximum assistance; Dependent/Total; 2 Person assistance  Stand to sit: Maximum assistance; Dependent/Total; 2 Person assistance  Transfer Comments: Pt given MAX verbal/physical and hand over hand cues to sequence movements, upright posture, pulling self up vs pushing back and overall safety with use of jessica stedy with poor follow thru due to fear of falling, pain and cognitive deficits. Cognition  Overall Cognitive Status: Exceptions  Arousal/Alertness: Delayed responses to stimuli; Inconsistent responses to stimuli  Following Commands: Inconsistently follows commands; Follows one step commands with repetition;  Follows one step commands with increased time; Does not follow commands  Attention Span: Difficulty attending to directions; Difficulty dividing attention; Attends with cues to redirect  Memory: Decreased short term memory; Decreased recall of recent events; Decreased long term memory; Decreased recall of precautions; Decreased recall of biographical Information  Safety Judgement: Decreased awareness of need for assistance; Decreased awareness of need for safety  Problem Solving: Assistance required to generate solutions; Assistance required to implement solutions; Assistance required to correct errors made; Assistance required to identify errors made; Decreased awareness of errors  Insights: Not aware of deficits  Initiation: Requires cues for all  Sequencing: Requires cues for all     Perception  Overall Perceptual Status: Impaired  Unilateral Attention: Cues to maintain midline in sitting; Cues to maintain midline in standing  Initiation: Hand over hand to initiate tasks                                   Plan   Plan  Times per week: 4-5x/week 1x/day as neel  Current Treatment Recommendations: Strengthening, Balance Training, Functional Mobility Training, Safety Education & Training, Positioning, Endurance Training, Neuromuscular Re-education, Patient/Caregiver Education & Training, Self-Care / ADL, Equipment Evaluation, Education, & procurement, Cognitive Reorientation, Pain Management, Home Management Training, Cognitive/Perceptual Training                        AM-PAC Score        AM-[facility] Inpatient Daily Activity Raw Score: 7 (11/24/21 1317)  AM-PAC Inpatient ADL T-Scale Score : 20.13 (11/24/21 1317)  ADL Inpatient CMS 0-100% Score: 92.44 (11/24/21 1317)  ADL Inpatient CMS G-Code Modifier : CM (11/24/21 1317)    Goals  Short term goals  Time Frame for Short term goals: by discharge, pt demo  Short term goal 1: bed mob tasks with use of rail as needed to max assist of 1.   Short term goal 2: increase BUE strength by a 1/2 grade to assist with self care tasks.  Short term goal 3: UB ADL to set up/min assist and LB ADL to max assist of 1 supine in bed and with use of rail as needed. Short term goal 4: postural control to EOB and min assist neel > 20 mins as needed to increase core strength/reduce falls with self care. Short term goal 5: ADL transfers with use of AD/lift as needed to max assist of 1 (add functional mob to POC as able). Long term goals  Long term goal 1: Pt to stand with mod assist of 1 neel > 4 mins as able to reduce falls in function. Long term goal 2: Pt to complete toileting tasks with use of BSC/AD and mod assist of 1. Long term goal 3: Caregivers to be I with pressure relief, BUE HEP, EC/WS and fall prevention tech as well as DME/AE and AD recommendations with use of handouts. Patient Goals   Patient goals : pt unable to state due to cognitive deficits       Therapy Time   Individual Concurrent Group Co-treatment   Time In       1251   Time Out       12   Minutes       15     Co-treatment with PT warranted secondary to decreased safety and independence requiring 2 skilled therapy professionals to address individual discipline's goals. OT addressing \"preparation for ADL transfer\",\"sitting balance for increased ADL performance\",\"sitting/activity tolerance\",\"functional reaching\",\"environmental safety/scanning\",\"fall prevention\",\"functional mobility for ADL transfers\",\"ability to sequence and follow directions\",\"functional UE strength\".          CLAUDETTE Husain

## 2021-11-24 NOTE — PROGRESS NOTES
Occupational Therapy  Facility/Department: [facility] MED SURG  Daily Treatment Note  NAME: Melissa Goodman  : 1935  MRN: [id number]    Date of Service: 2021    Discharge Recommendations:  Patient would benefit from continued therapy after discharge       Assessment   Performance deficits / Impairments: Decreased functional mobility ; Decreased safe awareness; Decreased balance; Decreased coordination; Decreased ADL status; Decreased cognition; Decreased vision/visual deficit; Decreased posture; Decreased endurance; Decreased high-level IADLs; Decreased strength; Decreased fine motor control  Assessment: At this time, pt is a high fall risk with 2 staff assist required when up and use of jessica stedy. Pt is limited by cognitive deficits and pain. Skilled OT is indicated for maximizing functional I and safety as able to reduce caregiver assist/burden. Prognosis: Poor  OT Education: Orientation; Precautions; Plan of Care; Energy Conservation; Transfer Training; OT Role  REQUIRES OT FOLLOW UP: Yes  Activity Tolerance  Activity Tolerance: Treatment limited secondary to decreased cognition; Patient limited by pain  Activity Tolerance: poor  Safety Devices  Safety Devices in place: Yes  Type of devices: All fall risk precautions in place; Nurse notified; Call light within reach; Gait belt; Patient at risk for falls; Left in bed; Bed alarm in place         Patient Diagnosis(es): The encounter diagnosis was Closed fracture of neck of left femur, initial encounter ([facility] has a past medical history of Dementia ([facility] and Hypertension. has a past surgical history that includes Tubal ligation; Appendectomy; and hip surgery (Left, 2021).     Restrictions  Restrictions/Precautions  Restrictions/Precautions: General Precautions, Fall Risk, Surgical Protocols  Required Braces or Orthoses?: Yes (ABD pillow)  Position Activity Restriction  Hip Precautions: Posterior hip precautions, No hip flexion > 90 degrees, No ADduction, No hip internal rotation  Other position/activity restrictions: FWB, heels off bed at all times, ABD pillow, posterior hip precautions, confusion, alarms, RUE IV, one on one staff  Subjective   General  Chart Reviewed: Yes  Patient assessed for rehabilitation services?: Yes  Response to previous treatment: Patient with no complaints from previous session  Family / Caregiver Present: Yes  Subjective  Subjective: RN called to ask for help with chair>bed transfer, upon arrival, pt's son and spouse helping patient stay in chair as she was trying to get out. Orientation  Orientation  Overall Orientation Status: Impaired  Orientation Level: Oriented to person; Disoriented to place; Disoriented to time; Disoriented to situation  Objective     Balance  Standing Balance: Dependent/Total  Functional Mobility  Functional Mobility Comments: N/T and not safe or appropriate at this time; jessica stedy used for transfers    Bed mobility  Sit to Supine: Maximum assistance; Dependent/Total; 2 Person assistance  Scooting: Dependent/Total; 2 Person assistance    Transfers  Sit to stand: Maximum assistance; Dependent/Total; 2 Person assistance  Transfer Comments: Pt given MAX verbal/physical and hand over hand cues to sequence movements, upright posture, pulling self up vs pushing back and overall safety with use of jessica stedy with poor follow thru due to fear of falling, pain and cognitive deficits. Cognition  Overall Cognitive Status: Exceptions  Arousal/Alertness: Delayed responses to stimuli; Inconsistent responses to stimuli  Following Commands: Inconsistently follows commands; Follows one step commands with repetition; Follows one step commands with increased time; Does not follow commands  Attention Span: Difficulty attending to directions; Difficulty dividing attention;  Attends with cues to redirect  Memory: Decreased short term memory; Decreased recall of recent events; Decreased long term memory; Decreased recall of precautions; Decreased recall of biographical Information  Safety Judgement: Decreased awareness of need for assistance; Decreased awareness of need for safety  Problem Solving: Assistance required to generate solutions; Assistance required to implement solutions; Assistance required to correct errors made; Assistance required to identify errors made; Decreased awareness of errors  Insights: Not aware of deficits  Initiation: Requires cues for all  Sequencing: Requires cues for all      Plan   Plan  Times per week: 4-5x/week 1x/day as neel  Times per day: Daily  Current Treatment Recommendations: Strengthening, Balance Training, Functional Mobility Training, Safety Education & Training, Positioning, Endurance Training, Neuromuscular Re-education, Patient/Caregiver Education & Training, Self-Care / ADL, Equipment Evaluation, Education, & procurement, Cognitive Reorientation, Pain Management, Home Management Training, Cognitive/Perceptual Training    AM-PAC Score        AM-[facility] Inpatient Daily Activity Raw Score: 7 (11/24/21 1455)  AM-PAC Inpatient ADL T-Scale Score : 20.13 (11/24/21 1455)  ADL Inpatient CMS 0-100% Score: 92.44 (11/24/21 1455)  ADL Inpatient CMS G-Code Modifier : CM (11/24/21 1455)    Goals  Short term goals  Time Frame for Short term goals: by discharge, pt demo  Short term goal 1: bed mob tasks with use of rail as needed to max assist of 1. Short term goal 2: increase BUE strength by a 1/2 grade to assist with self care tasks. Short term goal 3: UB ADL to set up/min assist and LB ADL to max assist of 1 supine in bed and with use of rail as needed. Short term goal 4: postural control to EOB and min assist neel > 20 mins as needed to increase core strength/reduce falls with self care. Short term goal 5: ADL transfers with use of AD/lift as needed to max assist of 1 (add functional mob to POC as able).   Long term goals  Long term goal 1: Pt to stand with mod assist of 1 neel > 4 mins as able to reduce falls in function. Long term goal 2: Pt to complete toileting tasks with use of BSC/AD and mod assist of 1. Long term goal 3: Caregivers to be I with pressure relief, BUE HEP, EC/WS and fall prevention tech as well as DME/AE and AD recommendations with use of handouts. Patient Goals   Patient goals : pt unable to state due to cognitive deficits       Therapy Time   Individual Concurrent Group Co-treatment   Time In 12       Time Out 1444        Minutes 10          Co-treatment with PT warranted secondary to decreased safety and independence requiring 2 skilled therapy professionals to address individual discipline's goals. OT addressing preparation for ADL transfer, sitting balance for increased ADL performance, sitting/activity tolerance, functional reaching, environmental safety/scanning, fall prevention, functional mobility for ADL transfers and functional UE strength. Upon writer exit, call light within reach, pt retired to bed. All lines intact and patient positioned comfortably. All patient needs addressed prior to ending therapy session. Chart reviewed prior to treatment and patient is agreeable for therapy. RN reports patient is medically stable for therapy treatment this date.       SANDY Duran/RACHEL

## 2021-11-24 NOTE — PROGRESS NOTES
Progress note  [facility]    Adult Hospitalist      Name: Elvia Campbell  MRN: [id number]     [place]: [de-identified]  Room: 2001/2001-02    Admit Date: 11/19/2021  1:00 AM  PCP: Shonda Trevino MD    Primary Problem  Active Problems:    Left displaced femoral neck fracture ([address]  Resolved Problems:    * No resolved hospital problems. *        Assesment:       · Left displaced femoral neck fracture  · Essential hypertension  · Major depressive disorder  · Advanced Dementia  · Vitamin D deficiency  · Agitation  · Reduced urine output           Plan:     · Admit to MedSurg telemetry  · O2 maintain oxygen saturation greater than 92%. · Pain control  · Orthopedic consult, Open treatment of Left displaced femoral neck fracture with prosthetic replacement on 11/21/21  · Got clearance from cardiology  · PT OT  · Continue to monitor/telemetry/CBC with differential daily/BMP daily  · Ativan--avoid, also avoid narcotics  · Seroquel per neurology  · DVT and GI prophylaxis.   · Await bed SNF  · Encourage p.o.  · Increase IV fluids with caution  Continue medications as below      Scheduled Meds:   QUEtiapine  50 mg Oral Nightly    donepezil  5 mg Oral Nightly    citalopram  10 mg Oral Daily    sodium chloride flush  10 mL IntraVENous 2 times per day    enoxaparin  40 mg SubCUTAneous Daily     Continuous Infusions:   dextrose 5 % and 0.45 % NaCl 50 mL/hr at 11/23/21 0841    sodium chloride 125 mL/hr at 11/21/21 1254     PRN Meds:  HYDROcodone 5 mg - acetaminophen, 1 tablet, Q6H PRN  HYDROcodone 5 mg - acetaminophen, 2 tablet, Q6H PRN  morphine, 2 mg, Q4H PRN  sodium chloride flush, 10 mL, PRN  sodium chloride, 25 mL, PRN  ondansetron, 4 mg, Q8H PRN   Or  ondansetron, 4 mg, Q6H PRN  magnesium hydroxide, 30 mL, Daily PRN  acetaminophen, 650 mg, Q6H PRN   Or  acetaminophen, 650 mg, Q6H PRN  potassium chloride, 40 mEq, PRN   Or  potassium alternative oral replacement, 40 mEq, PRN   Or  potassium chloride, 10 mEq, PRN        Chief Complaint:     Chief Complaint   Patient presents with    Fall     left thigh pain         History of Present Illness:      Patient seen and examined at bedside. Patient is still confused and unable to provide meaningful history  However patient is pleasant  Responds to some commands appropriately  Sitter at bedside        HPI:    Elvia Velázquez is a 81 y.o.  female who presents with Fall (left thigh pain)      45-year-old lady with past medical history of hypertension presented to ER after an unwitnessed fall at nursing home. Patient is complaining of left leg pain and complaining of severe pain. Patient historian    Hemoglobin was 10.8. BMP was unremarkable. Blood pressure was 157/86. CT head and cervical spine was negative. X-ray hip shows left femoral neck fracture extending into the medial aspect of the greater trochanter with moderate varus angulation and some degree of overriding. I have personally reviewed the past medical history, past surgical history, medications, social history, and family history, and summarized in the note. Review of Systems:     Limited review of systems      Past Medical History:     Past Medical History:   Diagnosis Date    Dementia ([facility] [address]     Hypertension         Past Surgical History:     Past Surgical History:   Procedure Laterality Date    APPENDECTOMY      HIP SURGERY Left 11/21/2021    HIP HEMIARTHROPLASTY performed by Katerina Carmona MD at [address]          Medications Prior to Admission:       Prior to Admission medications    Medication Sig Start Date End Date Taking? Authorizing Provider   gabapentin (NEURONTIN) 100 MG capsule Take 100 mg by mouth 3 times daily.    Yes Historical Provider, MD   nitrofurantoin, macrocrystal-monohydrate, (MACROBID) 100 MG capsule Take 100 mg by mouth nightly   Yes Historical Provider, MD   polyethylene glycol (GLYCOLAX) 17 g packet Take 17 g by mouth daily   Yes Historical nondistended, bowel sounds present all four quadrants, no masses, hepatomegaly or splenomegaly  Neurological -unable to assess  Extremities - peripheral pulses palpable, no pedal edema or calf pain with palpation  Skin - no gross lesions, rashes, or induration noted        Data:     Labs:    Hematology:  Recent Labs     11/21/21 0457 11/22/21 0615 11/23/21  0520   WBC 9.3 11.8* 9.0   RBC 3.50* 3.45* 3.19*   HGB 11.2* 10.7* 10.0*   HCT 34.3* 34.5* 32.2*   MCV 98.0 100.0 100.9   MCH 32.0 31.0 31.3   MCHC 32.7 31.0 31.1   RDW 14.3 14.2 14.3    253 260   MPV 9.3 9.3 9.4     Chemistry:  Recent Labs     11/21/21 0457 11/22/21  0615 11/23/21  0520    139 139   K 3.9 4.4 4.0   CL 99 102 103   CO2 25 27 28   GLUCOSE 112* 126* 99   BUN 14 18 17   CREATININE 0.71 0.88 0.67   ANIONGAP 11 10 8*   LABGLOM >60 >60 >60   GFRAA >60 >60 >60   CALCIUM 8.5* 8.7 8.6     No results for input(s): PROT, LABALBU, LABA1C, I1YOFBF, U5VLXFK, FT4, TSH, AST, ALT, LDH, GGT, ALKPHOS, LABGGT, BILITOT, BILIDIR, AMMONIA, AMYLASE, LIPASE, LACTATE, CHOL, HDL, LDLCHOLESTEROL, CHOLHDLRATIO, TRIG, VLDL, JUU45BY, PHENYTOIN, PHENYF, URICACID, POCGLU in the last 72 hours. Lab Results   Component Value Date    INR 1.2 11/20/2021    PROTIME 14.8 (H) 11/20/2021       Lab Results   Component Value Date/Time    SPECIAL NOT REPORTED 06/19/2021 05:10 PM     Lab Results   Component Value Date/Time    CULTURE NO GROWTH 06/19/2021 05:10 PM       No results found for: POCPH, PHART, PH, POCPCO2, XIT8BQE, PCO2, POCPO2, PO2ART, PO2, POCHCO3, UYX2OCO, HCO3, NBEA, PBEA, BEART, BE, THGBART, THB, RYT4FHD, JAPC9LMZ, Q2YDUSHZ, O2SAT, FIO2    Radiology:    XR FEMUR LEFT (MIN 2 VIEWS)    Result Date: 11/19/2021  Left femoral neck fracture extending into the medial aspect of the greater trochanter with moderate varus angulation and some degree of overriding. Decreased bone density. Moderate atherosclerotic change noted.      CT Head WO Contrast    Result Date: 11/19/2021  No acute intracranial abnormality. CT Cervical Spine WO Contrast    Result Date: 11/19/2021  No acute abnormality of the cervical spine. Cervical spine degenerative changes as detailed above. Possibly significant foraminal stenosis on the right at C3-4 and C5-6. CT HIP LEFT WO CONTRAST    Result Date: 11/19/2021  Left femoral neck fracture as described. Osteoporosis. XR HIP 2-3 VW W PELVIS LEFT    Result Date: 11/19/2021  Left femoral neck fracture extending into the medial aspect of the greater trochanter with moderate varus angulation and some degree of overriding. Decreased bone density. Moderate atherosclerotic change noted. All radiological studies reviewed                Code Status:  Full Code    Electronically signed by Cecille Branham MD on 11/23/2021 at 10:12 PM     Copy sent to Dr. Josh Heller MD    This note was created with the assistance of a speech-recognition program.  Although the intention is to generate a document that actually reflects the content of the visit, no guarantees can be provided that every mistake has been identified and corrected by editing. Note was updated later by me after  physical examination and  completion of the assessment.

## 2021-11-24 NOTE — PROGRESS NOTES
Physical Therapy  Facility/Department: [facility]  Daily Treatment Note  NAME: Sloan Hernandez  : 1935  MRN: [id number]    Date of Service: 2021    Discharge Recommendations:  Patient would benefit from continued therapy after discharge        Assessment   Body structures, Functions, Activity limitations: Decreased functional mobility ; Decreased cognition; Decreased posture; Decreased ADL status; Decreased endurance; Decreased ROM; Decreased strength  Assessment:  Pt required 2 assist for safe transfers, unable to safely ambulate & required 2 assist with Yulissa Del Rio to mobilize to chair gait. With current deficits, Pt HIGH risk for falls & requires continued PT to maximize independence with functional mobility, balance, safety awareness & activity tolerance. Pt currently functioning below baseline. Would suggest additional therapy at time of discharge to maximize long term outcomes and prevent re-admission. Please refer to AM-PAC score for current level of function. Activity Tolerance  Activity Tolerance: Patient limited by pain; Patient limited by fatigue; Patient limited by endurance; Patient limited by cognitive status     Patient Diagnosis(es): The encounter diagnosis was Closed fracture of neck of left femur, initial encounter ([facility] [address] has a past medical history of Dementia ([facility] [address] and Hypertension. has a past surgical history that includes Tubal ligation; Appendectomy; and hip surgery (Left, 2021).     Restrictions  Restrictions/Precautions  Restrictions/Precautions: General Precautions, Fall Risk, Surgical Protocols  Required Braces or Orthoses?: Yes (ABD pillow)  Position Activity Restriction  Hip Precautions: Posterior hip precautions, No hip flexion > 90 degrees, No ADduction, No hip internal rotation  Other position/activity restrictions: FWB, heels off bed at all times, ABD pillow, posterior hip precautions, confusion, alarms, RUE IV, one on one staff  Subjective General  Chart Reviewed: Yes  Additional Pertinent Hx: dementia, HTN  Subjective  Subjective: pt very drowsy complains of being cold  Pain Screening  Patient Currently in Pain: Yes  Pain Assessment  Pain Assessment: 0-10  Pain Level: 8  Pain Location: Hip  Pain Orientation: Left  Vital Signs  Patient Currently in Pain: Yes       Orientation     Cognition      Objective   Bed mobility  Scootin Person assistance; Maximal assistance  Transfers  Sit to Stand: Maximum Assistance  Comment: used jessica quintero to transfer pt to bedside lazyboy chair pt perseverating on feeling cold and asking for her jacket, pt wrapped in warm blankets at conclusion of treatment session     Ambulation  Ambulation?: No     Balance  Posture: Fair  Sitting - Static: Poor  Sitting - Dynamic: Poor  Standing - Static: Poor  Standing - Dynamic: Poor  Exercises  Comments: pt unable to follow direction for ther ex                        G-Code     OutComes Score                                                     AM-PAC Score  AM-PAC Inpatient Mobility Raw Score : 9 (21 [facility])  AM-PAC Inpatient T-Scale Score : 30.55 ([facility])  Mobility Inpatient CMS 0-100% Score: 81.38 (21 [facility])  Mobility Inpatient CMS G-Code Modifier : CM (21 [facility])          Goals  Short term goals  Time Frame for Short term goals: 12 visits  Short term goal 1:  Inc bed mobility to [employer] term goal 2: Pt able to transfer from bed to chair with min assist using safest device  Short term goal 3: Pt to tolerate sitting EOB up to 30 minutes with UB/LB support to improve core stability & repositioning for pressure relief & prevent sedentary complications  Short term goal 4: Pt able to tolerate 30-40 min of activity to include ex, NMR & functional to facilitate activity tolerance to [address]  Times per week: 1-2x/D, 5-6D/week  Current Treatment Recommendations: Strengthening, ROM, Balance Training, Functional Mobility Training, Transfer Training, Endurance Training, ADL/Self-care Training, Gait Training, Safety Education & Training, Patient/Caregiver Education & Training, Equipment Evaluation, Education, & procurement  Safety Devices  Type of devices: Bed alarm in place, Call light within reach, Gait belt, Patient at risk for falls, Left in chair, Nurse notified     Therapy Time   cotreat Concurrent Group Co-treatment   Time In  1255         Time Out  1306         Minutes  9               Co-treatment with OT warranted secondary to decreased safety and independence requiring 2 skilled therapy professionals to address individual discipline's goals. PT addressing preparation for  Transfers, gait and pre gait activities,  sitting balance for increased  sitting/activity tolerance, functional mobility, environmental safety/scanning, fall prevention, functional mobility  transfers and functional LE strength. Upon writer exit, call light within reach, pt retired to bed. All lines intact and patient positioned comfortably. All patient needs addressed prior to ending therapy session. Chart reviewed prior to treatment and patient is agreeable for therapy. RN reports patient is medically stable for therapy treatment this date.        ZURDO ENGLE, PTA

## 2021-11-24 NOTE — PROGRESS NOTES
HPI/Hospital course: This is an 82 y/o WF with known Alzheimer's dementia who was admitted for management of a hip fracture s/p and unwitnessed fall. She normally resides at a nursing home. Following admission and surgical intervention she has been agitated and requires a 1:1 sitter. She has been receiving hydrocodone approximately 2-3x/day since then as well as some ativan. She is unable to give any further clinical information.     The sitter at the bedside reports that the patient apparently did not sleep well last night and has been intermittently anxious. She is presently sleeping deeply. Past Medical History:   Diagnosis Date    Dementia ([address]     Hypertension        Past Surgical History:   Procedure Laterality Date    APPENDECTOMY      HIP SURGERY Left 11/21/2021    HIP HEMIARTHROPLAST[facility] Chaim Urrutia [facility] [address] [facility] [address]         Family History   Problem Relation Age of Onset    No Known Problems Mother     Heart Disease Father        Social History     Socioeconomic History    Marital status:      Spouse name: None    Number of children: None    Years of education: None    Highest education level: None   Occupational History    None   Tobacco Use    Smoking status: Never Smoker    Smokeless tobacco: Never Used   Substance and Sexual Activity    Alcohol use: Yes     Alcohol/week: 1.0 standard drink     Types: 1 Glasses of wine per week     Comment: States \"One glass of wine a day\", also drinks margaritas    Drug use: No    Sexual activity: None   Other Topics Concern    None   Social History Narrative    None     Social Determinants of Health     Financial Resource Strain:     Difficulty of Paying Living Expenses: Not on file   Food Insecurity:     Worried About Running Out of Food in the Last Year: Not on file    Teodora of Food in the Last Year: Not on file   Transportation Needs:     Lack of Transportation (Medical):  Not on file    Lack RORO Greenberg MD   10 mL at 11/22/21 0739    sodium chloride flush 0.9 % injection 10 mL  10 mL IntraVENous PRN Eloise Olson MD        0.9 % sodium chloride infusion  25 mL IntraVENous PRN Eloise Olson  mL/hr at 11/21/21 1254 Rate Change at 11/21/21 1254    ondansetron (ZOFRAN-ODT) disintegrating tablet 4 mg  4 mg Oral Q8H PRN Eloise Olson MD        Or    ondansetron [facility]) injection 4 mg  4 mg IntraVENous Q6H PRN Eloise Olson MD        magnesium hydroxide (MILK OF MAGNESIA) 400 MG/5ML suspension 30 mL  30 mL Oral Daily PRN Eloise Olson MD        acetaminophen (TYLENOL) tablet 650 mg  650 mg Oral Q6H PRN Eloise Olson MD   650 mg at 11/19/21 0547    Or    acetaminophen (TYLENOL) suppository 650 mg  650 mg Rectal Q6H PRN Eloise Olson MD        potassium chloride (KLOR-CON M) extended release tablet 40 mEq  40 mEq Oral PRN Eloise Olson MD        Or    potassium bicarb-citric acid (EFFER-K) effervescent tablet 40 mEq  40 mEq Oral PRN Eloise Olson MD        Or    potassium chloride 10 mEq/100 mL IVPB (Peripheral Line)  10 mEq IntraVENous PRN Eloise Olson MD        enoxaparin (LOVENOX) injection 40 mg  40 mg SubCUTAneous Daily Eloise Olson MD   40 mg at 11/23/21 0843       No Known Allergies        Vitals:    11/24/21 0703   BP: (!) 152/73   Pulse: 94   Resp: 16   Temp: 99.2 °F (37.3 °C)   SpO2: 93%     Admission weight: 109 lb (49.4 kg)    Neurological Examination  Constitutional .General exam well groomed   Neurologic exam deferred. Patient sleeping    Assessment : This is an 82 y/o WF with moderate to severe Alzheimer's dementia who was admitted following a mechanical fall and hip fracture. She has been understandibly confused, disoriented and agitated. She was on regular dose narcotics which was likely a contributor. Plan:  1. Continue seroquel 50mg qhs  2. Continue to avoid narcotics and CNS active agents as possible  3.  Will increase Aricept to 10mg            Michelet Edwards M.D.   Clinical Neurophysiologist  Neuromuscular Medicine

## 2021-11-24 NOTE — CARE COORDINATION
[employer] is working on [employer]. Received call form [facility] If patient is not approved for admission and is transferred to [facility] with hospice, they will need to be notified at [phone]. Updated son.  Warner Domingo

## 2021-11-24 NOTE — PLAN OF CARE
Problem: Skin Integrity:  Goal: Will show no infection signs and symptoms  Description: Will show no infection signs and symptoms  Outcome: Ongoing  Goal: Absence of new skin breakdown  Description: Absence of new skin breakdown  Outcome: Ongoing     Problem: Falls - Risk of:  Goal: Will remain free from falls  Description: Will remain free from falls  Outcome: Ongoing  Goal: Absence of physical injury  Description: Absence of physical injury  Outcome: Ongoing     Problem: SAFETY  Goal: Free from accidental physical injury  Outcome: Ongoing     Problem: DAILY CARE  Goal: Daily care needs are met  Outcome: Ongoing     Problem: PAIN  Goal: Patient's pain/discomfort is manageable  Outcome: Ongoing     Problem: SKIN INTEGRITY  Goal: Skin integrity is maintained or improved  Outcome: Ongoing     Problem: KNOWLEDGE DEFICIT  Goal: Patient/S.O. demonstrates understanding of disease process, treatment plan, medications, and discharge instructions.   Outcome: Ongoing     Problem: DISCHARGE BARRIERS  Goal: Patient's continuum of care needs are met  Outcome: Ongoing     Problem: Confusion - Acute:  Goal: Absence of continued neurological deterioration signs and symptoms  Description: Absence of continued neurological deterioration signs and symptoms  Outcome: Ongoing  Goal: Mental status will be restored to baseline  Description: Mental status will be restored to baseline  Outcome: Ongoing     Problem: Discharge Planning:  Goal: Ability to perform activities of daily living will improve  Description: Ability to perform activities of daily living will improve  Outcome: Ongoing  Goal: Participates in care planning  Description: Participates in care planning  Outcome: Ongoing     Problem: Injury - Risk of, Physical Injury:  Goal: Will remain free from falls  Description: Will remain free from falls  Outcome: Ongoing  Goal: Absence of physical injury  Description: Absence of physical injury  Outcome: Ongoing     Problem: Mood - Altered:  Goal: Mood stable  Description: Mood stable  Outcome: Ongoing  Goal: Absence of abusive behavior  Description: Absence of abusive behavior  Outcome: Ongoing  Goal: Verbalizations of feeling emotionally comfortable while being cared for will increase  Description: Verbalizations of feeling emotionally comfortable while being cared for will increase  Outcome: Ongoing     Problem: Psychomotor Activity - Altered:  Goal: Absence of psychomotor disturbance signs and symptoms  Description: Absence of psychomotor disturbance signs and symptoms  Outcome: Ongoing     Problem: Sensory Perception - Impaired:  Goal: Demonstrations of improved sensory functioning will increase  Description: Demonstrations of improved sensory functioning will increase  Outcome: Ongoing  Goal: Decrease in sensory misperception frequency  Description: Decrease in sensory misperception frequency  Outcome: Ongoing  Goal: Able to refrain from responding to false sensory perceptions  Description: Able to refrain from responding to false sensory perceptions  Outcome: Ongoing  Goal: Demonstrates accurate environmental perceptions  Description: Demonstrates accurate environmental perceptions  Outcome: Ongoing  Goal: Able to distinguish between reality-based and nonreality-based thinking  Description: Able to distinguish between reality-based and nonreality-based thinking  Outcome: Ongoing  Goal: Able to interrupt nonreality-based thinking  Description: Able to interrupt nonreality-based thinking  Outcome: Ongoing     Problem: Sleep Pattern Disturbance:  Goal: Appears well-rested  Description: Appears well-rested  Outcome: Ongoing     Problem: Pain:  Goal: Pain level will decrease  Description: Pain level will decrease  Outcome: Ongoing  Goal: Control of acute pain  Description: Control of acute pain  Outcome: Ongoing  Goal: Control of chronic pain  Description: Control of chronic pain  Outcome: Ongoing     Problem: Tissue Perfusion - Cardiopulmonary, Altered:  Goal: Absence of angina  Description: Absence of angina  Outcome: Ongoing

## 2021-11-25 LAB
ABSOLUTE EOS #: 0.27 K/UL (ref 0–0.44)
ABSOLUTE IMMATURE GRANULOCYTE: 0.06 K/UL (ref 0–0.3)
ABSOLUTE LYMPH #: 1.25 K/UL (ref 1.1–3.7)
ABSOLUTE MONO #: 0.93 K/UL (ref 0.1–1.2)
ANION GAP SERPL CALCULATED.3IONS-SCNC: 8 MMOL/L (ref 9–17)
BASOPHILS # BLD: 0 % (ref 0–2)
BASOPHILS ABSOLUTE: 0.03 K/UL (ref 0–0.2)
BUN BLDV-MCNC: 13 MG/DL (ref 8–23)
BUN/CREAT BLD: 19 (ref 9–20)
CALCIUM SERPL-MCNC: 8.2 MG/DL (ref 8.6–10.4)
CHLORIDE BLD-SCNC: 105 MMOL/L (ref 98–107)
CO2: 27 MMOL/L (ref 20–31)
CREAT SERPL-MCNC: 0.7 MG/DL (ref 0.5–0.9)
DIFFERENTIAL TYPE: ABNORMAL
EOSINOPHILS RELATIVE PERCENT: 4 % (ref 1–4)
GFR AFRICAN AMERICAN: >60 ML/MIN
GFR NON-AFRICAN AMERICAN: >60 ML/MIN
GFR SERPL CREATININE-BSD FRML MDRD: ABNORMAL ML/MIN/{1.73_M2}
GFR SERPL CREATININE-BSD FRML MDRD: ABNORMAL ML/MIN/{1.73_M2}
GLUCOSE BLD-MCNC: 101 MG/DL (ref 70–99)
HCT VFR BLD CALC: 27.9 % (ref 36.3–47.1)
HEMOGLOBIN: 8.7 G/DL (ref 11.9–15.1)
IMMATURE GRANULOCYTES: 1 %
LYMPHOCYTES # BLD: 17 % (ref 24–43)
MAGNESIUM: 2.2 MG/DL (ref 1.6–2.6)
MCH RBC QN AUTO: 31.2 PG (ref 25.2–33.5)
MCHC RBC AUTO-ENTMCNC: 31.2 G/DL (ref 28.4–34.8)
MCV RBC AUTO: 100 FL (ref 82.6–102.9)
MONOCYTES # BLD: 12 % (ref 3–12)
NRBC AUTOMATED: 0 PER 100 WBC
PDW BLD-RTO: 14.1 % (ref 11.8–14.4)
PLATELET # BLD: 282 K/UL (ref 138–453)
PLATELET ESTIMATE: ABNORMAL
PMV BLD AUTO: 9.5 FL (ref 8.1–13.5)
POTASSIUM SERPL-SCNC: 3.5 MMOL/L (ref 3.7–5.3)
RBC # BLD: 2.79 M/UL (ref 3.95–5.11)
RBC # BLD: ABNORMAL 10*6/UL
SEG NEUTROPHILS: 66 % (ref 36–65)
SEGMENTED NEUTROPHILS ABSOLUTE COUNT: 4.98 K/UL (ref 1.5–8.1)
SODIUM BLD-SCNC: 140 MMOL/L (ref 135–144)
WBC # BLD: 7.5 K/UL (ref 3.5–11.3)
WBC # BLD: ABNORMAL 10*3/UL

## 2021-11-25 PROCEDURE — 36415 COLL VENOUS BLD VENIPUNCTURE: CPT

## 2021-11-25 PROCEDURE — 6360000002 HC RX W HCPCS: Performed by: ORTHOPAEDIC SURGERY

## 2021-11-25 PROCEDURE — 6370000000 HC RX 637 (ALT 250 FOR IP): Performed by: ORTHOPAEDIC SURGERY

## 2021-11-25 PROCEDURE — 6370000000 HC RX 637 (ALT 250 FOR IP): Performed by: PSYCHIATRY & NEUROLOGY

## 2021-11-25 PROCEDURE — 83735 ASSAY OF MAGNESIUM: CPT

## 2021-11-25 PROCEDURE — 85025 COMPLETE CBC W/AUTO DIFF WBC: CPT

## 2021-11-25 PROCEDURE — 2580000003 HC RX 258: Performed by: ORTHOPAEDIC SURGERY

## 2021-11-25 PROCEDURE — 80048 BASIC METABOLIC PNL TOTAL CA: CPT

## 2021-11-25 PROCEDURE — 1200000000 HC SEMI PRIVATE

## 2021-11-25 RX ADMIN — CITALOPRAM HYDROBROMIDE 10 MG: 10 TABLET ORAL at 08:48

## 2021-11-25 RX ADMIN — MORPHINE SULFATE 2 MG: 2 INJECTION, SOLUTION INTRAMUSCULAR; INTRAVENOUS at 05:55

## 2021-11-25 RX ADMIN — POTASSIUM BICARBONATE 40 MEQ: 782 TABLET, EFFERVESCENT ORAL at 10:23

## 2021-11-25 RX ADMIN — HYDROCODONE BITARTRATE AND ACETAMINOPHEN 2 TABLET: 5; 325 TABLET ORAL at 08:47

## 2021-11-25 RX ADMIN — QUETIAPINE FUMARATE 50 MG: 25 TABLET ORAL at 21:49

## 2021-11-25 RX ADMIN — SODIUM CHLORIDE, PRESERVATIVE FREE 10 ML: 5 INJECTION INTRAVENOUS at 08:47

## 2021-11-25 RX ADMIN — DONEPEZIL HYDROCHLORIDE 10 MG: 10 TABLET, FILM COATED ORAL at 21:49

## 2021-11-25 RX ADMIN — ENOXAPARIN SODIUM 40 MG: 100 INJECTION SUBCUTANEOUS at 08:47

## 2021-11-25 RX ADMIN — HYDROCODONE BITARTRATE AND ACETAMINOPHEN 2 TABLET: 5; 325 TABLET ORAL at 17:01

## 2021-11-25 ASSESSMENT — PAIN DESCRIPTION - DESCRIPTORS
DESCRIPTORS: DISCOMFORT

## 2021-11-25 ASSESSMENT — PAIN DESCRIPTION - ORIENTATION
ORIENTATION: LEFT

## 2021-11-25 ASSESSMENT — PAIN DESCRIPTION - PAIN TYPE
TYPE: SURGICAL PAIN

## 2021-11-25 ASSESSMENT — PAIN DESCRIPTION - PROGRESSION
CLINICAL_PROGRESSION: GRADUALLY WORSENING
CLINICAL_PROGRESSION: GRADUALLY IMPROVING
CLINICAL_PROGRESSION: NOT CHANGED
CLINICAL_PROGRESSION: GRADUALLY IMPROVING
CLINICAL_PROGRESSION: GRADUALLY WORSENING

## 2021-11-25 ASSESSMENT — PAIN DESCRIPTION - FREQUENCY
FREQUENCY: CONTINUOUS

## 2021-11-25 ASSESSMENT — PAIN SCALES - GENERAL
PAINLEVEL_OUTOF10: 0
PAINLEVEL_OUTOF10: 7
PAINLEVEL_OUTOF10: 7
PAINLEVEL_OUTOF10: 5
PAINLEVEL_OUTOF10: 5
PAINLEVEL_OUTOF10: 7

## 2021-11-25 ASSESSMENT — PAIN - FUNCTIONAL ASSESSMENT
PAIN_FUNCTIONAL_ASSESSMENT: PREVENTS OR INTERFERES WITH MANY ACTIVE NOT PASSIVE ACTIVITIES
PAIN_FUNCTIONAL_ASSESSMENT: PREVENTS OR INTERFERES WITH MANY ACTIVE NOT PASSIVE ACTIVITIES
PAIN_FUNCTIONAL_ASSESSMENT: PREVENTS OR INTERFERES SOME ACTIVE ACTIVITIES AND ADLS

## 2021-11-25 ASSESSMENT — PAIN DESCRIPTION - ONSET
ONSET: ON-GOING

## 2021-11-25 ASSESSMENT — PAIN DESCRIPTION - LOCATION
LOCATION: HIP

## 2021-11-25 NOTE — PROGRESS NOTES
Progress note  [facility].,    Adult Hospitalist      Name: Mario Leyva  MRN: [id number]     [place]: [de-identified]  Room: 2001/2001-02    Admit Date: 11/19/2021  1:00 AM  PCP: Chilo Bower MD    Primary Problem  Active Problems:    Left displaced femoral neck fracture ([address]  Resolved Problems:    * No resolved hospital problems. *        Assesment:       · Left displaced femoral neck fracture  · Essential hypertension  · Major depressive disorder  · Advanced Dementia  · Vitamin D deficiency  · Agitation  · Reduced urine output           Plan:     · Admit to MedSurg telemetry  · O2 maintain oxygen saturation greater than 92%. · Pain control  · Orthopedic consult, Open treatment of Left displaced femoral neck fracture with prosthetic replacement on 11/21/21  · Got clearance from cardiology  · PT OT  · Continue to monitor/telemetry/CBC with differential daily/BMP daily  · Ativan--avoid, also avoid narcotics  · Seroquel per neurology  · DVT and GI prophylaxis.   · Await bed SNF  · Encourage p.o.  · Increase IV fluids with caution  Continue medications as below      Scheduled Meds:   donepezil  10 mg Oral Nightly    QUEtiapine  50 mg Oral Nightly    citalopram  10 mg Oral Daily    sodium chloride flush  10 mL IntraVENous 2 times per day    enoxaparin  40 mg SubCUTAneous Daily     Continuous Infusions:   dextrose 5 % and 0.45 % NaCl 50 mL/hr at 11/24/21 2132    sodium chloride 125 mL/hr at 11/21/21 1254     PRN Meds:  HYDROcodone 5 mg - acetaminophen, 1 tablet, Q6H PRN  HYDROcodone 5 mg - acetaminophen, 2 tablet, Q6H PRN  morphine, 2 mg, Q4H PRN  sodium chloride flush, 10 mL, PRN  sodium chloride, 25 mL, PRN  ondansetron, 4 mg, Q8H PRN   Or  ondansetron, 4 mg, Q6H PRN  magnesium hydroxide, 30 mL, Daily PRN  acetaminophen, 650 mg, Q6H PRN   Or  acetaminophen, 650 mg, Q6H PRN  potassium chloride, 40 mEq, PRN   Or  potassium alternative oral replacement, 40 mEq, PRN   Or  potassium chloride, 10 Historical Provider, MD   simvastatin (ZOCOR) 40 MG tablet Take 40 mg by mouth nightly   Yes Historical Provider, MD   timolol (TIMOPTIC) 0.5 % ophthalmic solution Place 1 drop into both eyes 2 times daily   Yes Historical Provider, MD   traMADol (ULTRAM) 50 MG tablet Take 50 mg by mouth every 8 hours as needed for Pain. Yes Historical Provider, MD   enoxaparin (LOVENOX) 40 MG/0.4ML injection Inject 0.4 mLs into the skin daily for 42 doses 21 Yes Amy Maddox MD   HYDROcodone-acetaminophen (NORCO) 5-325 MG per tablet Take 1 tablet by mouth every 6 hours as needed for Pain for up to 7 days. Do not exceed 3000 mg of Acetaminophen in 24 hrs. 21 Yes Amy Maddox MD   citalopram (CELEXA) 10 MG tablet Take 10 mg by mouth daily   Yes Historical Provider, MD        Allergies:       Patient has no known allergies. Social History:     Tobacco:    reports that she has never smoked. She has never used smokeless tobacco.  Alcohol:      reports current alcohol use of about 1.0 standard drink of alcohol per week. Drug Use:  reports no history of drug use.     Family History:     Family History   Problem Relation Age of Onset    No Known Problems Mother     Heart Disease Father          Physical Exam:     Vitals:  BP (!) 105/54   Pulse 82   Temp 97.7 °F (36.5 °C) (Oral)   Resp 16   Ht 5' 6\" (1.676 m)   Wt 109 lb (49.4 kg)   SpO2 93%   BMI 17.59 kg/m²   Temp (24hrs), Av.8 °F (36.6 °C), Min:97.7 °F (36.5 °C), Max:97.9 °F (36.6 °C)          General appearance -sleeping  Mental status -confused  Head - normocephalic and atraumatic  Eyes - pupils equal and reactive, extraocular eye movements intact, conjunctiva clear  Ears - hearing appears to be intact  Nose - no drainage noted  Mouth - mucous membranes moist  Neck - supple, no carotid bruits, thyroid not palpable  Chest - clear to auscultation, normal effort  Heart - normal rate, regular rhythm, no murmur  Abdomen - soft, nontender, nondistended, bowel sounds present all four quadrants, no masses, hepatomegaly or splenomegaly  Neurological -unable to assess  Extremities - peripheral pulses palpable, no pedal edema or calf pain with palpation  Skin - no gross lesions, rashes, or induration noted        Data:     Labs:    Hematology:  Recent Labs     11/23/21  0520 11/24/21  0656 11/25/21  0526   WBC 9.0 8.2 7.5   RBC 3.19* 3.11* 2.79*   HGB 10.0* 9.7* 8.7*   HCT 32.2* 31.2* 27.9*   .9 100.3 100.0   MCH 31.3 31.2 31.2   MCHC 31.1 31.1 31.2   RDW 14.3 14.1 14.1    244 282   MPV 9.4 9.1 9.5     Chemistry:  Recent Labs     11/23/21  0520 11/24/21  0656 11/25/21  0526    139 140   K 4.0 3.8 3.5*    105 105   CO2 28 25 27   GLUCOSE 99 104* 101*   BUN 17 14 13   CREATININE 0.67 0.62 0.70   MG  --   --  2.2   ANIONGAP 8* 9 8*   LABGLOM >60 >60 >60   GFRAA >60 >60 >60   CALCIUM 8.6 8.4* 8.2*     No results for input(s): PROT, LABALBU, LABA1C, N9OLWWN, D7ODZZK, FT4, TSH, AST, ALT, LDH, GGT, ALKPHOS, LABGGT, BILITOT, BILIDIR, AMMONIA, AMYLASE, LIPASE, LACTATE, CHOL, HDL, LDLCHOLESTEROL, CHOLHDLRATIO, TRIG, VLDL, NPA78OR, PHENYTOIN, PHENYF, URICACID, POCGLU in the last 72 hours. Lab Results   Component Value Date    INR 1.2 11/20/2021    PROTIME 14.8 (H) 11/20/2021       Lab Results   Component Value Date/Time    SPECIAL NOT REPORTED 06/19/2021 05:10 PM     Lab Results   Component Value Date/Time    CULTURE NO GROWTH 06/19/2021 05:10 PM       No results found for: POCPH, PHART, PH, POCPCO2, UEA9ZVO, PCO2, POCPO2, PO2ART, PO2, POCHCO3, JRA5SLU, HCO3, NBEA, PBEA, BEART, BE, THGBART, THB, FRY2UUP, NCKM9GBL, P0PEAFDN, O2SAT, FIO2    Radiology:    XR FEMUR LEFT (MIN 2 VIEWS)    Result Date: 11/19/2021  Left femoral neck fracture extending into the medial aspect of the greater trochanter with moderate varus angulation and some degree of overriding. Decreased bone density. Moderate atherosclerotic change noted.      CT Head WO Contrast    Result Date: 11/19/2021  No acute intracranial abnormality. CT Cervical Spine WO Contrast    Result Date: 11/19/2021  No acute abnormality of the cervical spine. Cervical spine degenerative changes as detailed above. Possibly significant foraminal stenosis on the right at C3-4 and C5-6. CT HIP LEFT WO CONTRAST    Result Date: 11/19/2021  Left femoral neck fracture as described. Osteoporosis. XR HIP 2-3 VW W PELVIS LEFT    Result Date: 11/19/2021  Left femoral neck fracture extending into the medial aspect of the greater trochanter with moderate varus angulation and some degree of overriding. Decreased bone density. Moderate atherosclerotic change noted. All radiological studies reviewed                Code Status:  Full Code    Electronically signed by Dianne Juarez MD on 11/25/2021 at 12:58 PM     Copy sent to Dr. Chilo Bower MD    This note was created with the assistance of a speech-recognition program.  Although the intention is to generate a document that actually reflects the content of the visit, no guarantees can be provided that every mistake has been identified and corrected by editing. Note was updated later by me after  physical examination and  completion of the assessment.

## 2021-11-25 NOTE — PLAN OF CARE
Problem: Skin Integrity:  Goal: Will show no infection signs and symptoms  Description: Will show no infection signs and symptoms  11/25/2021 1030 by Catrina Amador RN  Outcome: Ongoing  11/24/2021 2353 by Chaka Clark RN  Outcome: Ongoing  Goal: Absence of new skin breakdown  Description: Absence of new skin breakdown  11/25/2021 1030 by Catrina Amador RN  Outcome: Ongoing  11/24/2021 2353 by Chaka Clark RN  Outcome: Ongoing     Problem: Falls - Risk of:  Goal: Will remain free from falls  Description: Will remain free from falls  11/25/2021 1030 by Catrina Amador RN  Outcome: Ongoing  11/24/2021 2353 by Chaka Clark RN  Outcome: Ongoing  Goal: Absence of physical injury  Description: Absence of physical injury  11/25/2021 1030 by Catrina Amador RN  Outcome: Ongoing  11/24/2021 2353 by Chaka Clark RN  Outcome: Ongoing     Problem: SAFETY  Goal: Free from accidental physical injury  Outcome: Ongoing     Problem: DAILY CARE  Goal: Daily care needs are met  Outcome: Ongoing     Problem: PAIN  Goal: Patient's pain/discomfort is manageable  Outcome: Ongoing     Problem: SKIN INTEGRITY  Goal: Skin integrity is maintained or improved  Outcome: Ongoing     Problem: KNOWLEDGE DEFICIT  Goal: Patient/S.O. demonstrates understanding of disease process, treatment plan, medications, and discharge instructions.   Outcome: Ongoing     Problem: DISCHARGE BARRIERS  Goal: Patient's continuum of care needs are met  Outcome: Ongoing     Problem: Confusion - Acute:  Goal: Absence of continued neurological deterioration signs and symptoms  Description: Absence of continued neurological deterioration signs and symptoms  Outcome: Ongoing  Goal: Mental status will be restored to baseline  Description: Mental status will be restored to baseline  Outcome: Ongoing     Problem: Discharge Planning:  Goal: Ability to perform activities of daily living will improve  Description: Ability to perform activities of daily living will improve  Outcome: Ongoing  Goal: Participates in care planning  Description: Participates in care planning  Outcome: Ongoing     Problem: Injury - Risk of, Physical Injury:  Goal: Will remain free from falls  Description: Will remain free from falls  11/25/2021 1030 by Miesha Villasenor RN  Outcome: Ongoing  11/24/2021 2353 by Georgina Cotter RN  Outcome: Ongoing  Goal: Absence of physical injury  Description: Absence of physical injury  11/25/2021 1030 by Miesha Villasenor RN  Outcome: Ongoing  11/24/2021 2353 by Georgina Cotter RN  Outcome: Ongoing     Problem: Mood - Altered:  Goal: Mood stable  Description: Mood stable  Outcome: Ongoing  Goal: Absence of abusive behavior  Description: Absence of abusive behavior  Outcome: Ongoing  Goal: Verbalizations of feeling emotionally comfortable while being cared for will increase  Description: Verbalizations of feeling emotionally comfortable while being cared for will increase  Outcome: Ongoing     Problem: Psychomotor Activity - Altered:  Goal: Absence of psychomotor disturbance signs and symptoms  Description: Absence of psychomotor disturbance signs and symptoms  Outcome: Ongoing     Problem: Sensory Perception - Impaired:  Goal: Demonstrations of improved sensory functioning will increase  Description: Demonstrations of improved sensory functioning will increase  Outcome: Ongoing  Goal: Decrease in sensory misperception frequency  Description: Decrease in sensory misperception frequency  Outcome: Ongoing  Goal: Able to refrain from responding to false sensory perceptions  Description: Able to refrain from responding to false sensory perceptions  Outcome: Ongoing  Goal: Demonstrates accurate environmental perceptions  Description: Demonstrates accurate environmental perceptions  Outcome: Ongoing  Goal: Able to distinguish between reality-based and nonreality-based thinking  Description: Able to distinguish between reality-based and nonreality-based thinking  Outcome: Ongoing  Goal: Able to interrupt nonreality-based thinking  Description: Able to interrupt nonreality-based thinking  Outcome: Ongoing     Problem: Pain:  Goal: Pain level will decrease  Description: Pain level will decrease  Outcome: Ongoing  Goal: Control of acute pain  Description: Control of acute pain  Outcome: Ongoing  Goal: Control of chronic pain  Description: Control of chronic pain  Outcome: Ongoing     Problem: Tissue Perfusion - Cardiopulmonary, Altered:  Goal: Absence of angina  Description: Absence of angina  Outcome: Ongoing

## 2021-11-25 NOTE — PROGRESS NOTES
Occupational Therapy  DATE: 2021    NAME: Melissa Goodman  MRN: [id number]   : 1935    Patient not seen this date for Occupational Therapy due to:      [] Cancel by RN or physician due to:    [] Hemodialysis    [] Critical Lab Value Level     [] Blood transfusion in progress    [] Acute or unstable cardiovascular status   _MAP < 55 or more than >115  _HR < 40 or > 130    [] Acute or unstable pulmonary status   -FiO2 > 60%   _RR < 5 or >40    _O2 sats < 85%    [] Strict Bedrest    [] Off Unit for surgery or procedure    [] Off Unit for testing       [] Pending imaging to R/O fracture    [] Refusal by Patient      [x] Other- cx tx as pt with increased lethargy/confusion; pt opens eyes on command and says no to any movements or therapy/falls back to sleep imediately; RN was informed and aware/okayed and states her seroquel was increased. Pt not appropriate this date. [] PT being discontinued at this time. Patient independent. No further needs. [] PT being discontinued at this time as the patient has been transferred to hospice care. No further needs.       Hari Sidhu, OT

## 2021-11-25 NOTE — PROGRESS NOTES
Physical Therapy  DATE: 2021    NAME: Kathi Deluna  MRN: [id number]   : 1935    Patient not seen this date for Physical Therapy due to:      [] Cancel by RN or physician due to:    [] Hemodialysis    [] Critical Lab Value Level     [] Blood transfusion in progress    [] Acute or unstable cardiovascular status   _MAP < 55 or more than >115  _HR < 40 or > 130    [] Acute or unstable pulmonary status   -FiO2 > 60%   _RR < 5 or >40    _O2 sats < 85%    [] Strict Bedrest    [] Off Unit for surgery or procedure    [] Off Unit for testing       [] Pending imaging to R/O fracture    [] Refusal by Patient      [x] Other:  Pt w/ increased lethargy this date, opening eyes minimally to writer to say \"no, everything hurts\". RN informed and aware, states pt's seroquel was increased. Pt not appropriate for therapy this date. PT will continue to follow. [] PT being discontinued at this time. Patient independent. No further needs. [] PT being discontinued at this time as the patient has been transferred to hospice care. No further needs.       Dorothea Kinney, PT

## 2021-11-25 NOTE — PROGRESS NOTES
Physician Progress Note      PATIENT:               Geronimo Devine  CSN #:                  [id number]  :                       1935  ADMIT DATE:       2021 1:00 AM  [address] [place] DATE:  RESPONDING  PROVIDER #:        Beryl Lambert MD          QUERY TEXT:    Pt admitted with femur fracture. Pt noted to have decreasing hemoglobin   levels. If possible, please document in the progress notes and discharge   summary if you are evaluating and/or treating any of the following: The medical record reflects the following:  Risk Factors: Femur FX, Post-OR  Clinical Indicators: Hgb of 11.2, lowest Hgb of 9.7, EBL of 100 mL in OR, fell   in nursing home  Treatment: OR for repair, IVF, 500 mL Bolus of NS X1, monitoring    Thank you,  Joshua Joyner RN  Options provided:  -- Acute blood loss anemia  -- Postoperative acute blood loss anemia  -- Dilutional anemia  -- Other - I will add my own diagnosis  -- Disagree - Not applicable / Not valid  -- Disagree - Clinically unable to determine / Unknown  -- Refer to Clinical Documentation Reviewer    PROVIDER RESPONSE TEXT:    This patient has acute blood loss anemia. Query created by:  João Douglass on 2021 9:11 AM      Electronically signed by:  Beryl Lambert MD 2021 12:55 PM

## 2021-11-25 NOTE — PROGRESS NOTES
Progress note  [facility].,    Adult Hospitalist      Name: Everett Juarez  MRN: [id number]     [place]: [de-identified]  Room: 2001/2001-02    Admit Date: 11/19/2021  1:00 AM  PCP: Cordell Alas MD    Primary Problem  Active Problems:    Left displaced femoral neck fracture ([address]  Resolved Problems:    * No resolved hospital problems. *        Assesment:       · Left displaced femoral neck fracture  · Essential hypertension  · Major depressive disorder  · Advanced Dementia  · Vitamin D deficiency  · Agitation  · Reduced urine output           Plan:     · Admit to MedSurg telemetry  · O2 maintain oxygen saturation greater than 92%. · Pain control  · Orthopedic consult, Open treatment of Left displaced femoral neck fracture with prosthetic replacement on 11/21/21  · Got clearance from cardiology  · PT OT  · Continue to monitor/telemetry/CBC with differential daily/BMP daily  · Ativan--avoid, also avoid narcotics  · Seroquel per neurology  · DVT and GI prophylaxis.   · Await bed SNF  · Encourage p.o.  · Increase IV fluids with caution  Continue medications as below      Scheduled Meds:   donepezil  10 mg Oral Nightly    QUEtiapine  50 mg Oral Nightly    citalopram  10 mg Oral Daily    sodium chloride flush  10 mL IntraVENous 2 times per day    enoxaparin  40 mg SubCUTAneous Daily     Continuous Infusions:   dextrose 5 % and 0.45 % NaCl 50 mL/hr at 11/24/21 2132    sodium chloride 125 mL/hr at 11/21/21 1254     PRN Meds:  HYDROcodone 5 mg - acetaminophen, 1 tablet, Q6H PRN  HYDROcodone 5 mg - acetaminophen, 2 tablet, Q6H PRN  morphine, 2 mg, Q4H PRN  sodium chloride flush, 10 mL, PRN  sodium chloride, 25 mL, PRN  ondansetron, 4 mg, Q8H PRN   Or  ondansetron, 4 mg, Q6H PRN  magnesium hydroxide, 30 mL, Daily PRN  acetaminophen, 650 mg, Q6H PRN   Or  acetaminophen, 650 mg, Q6H PRN  potassium chloride, 40 mEq, PRN   Or  potassium alternative oral replacement, 40 mEq, PRN   Or  potassium chloride, 10 mEq, PRN        Chief Complaint:     Chief Complaint   Patient presents with    Fall     left thigh pain         History of Present Illness:      Patient seen and examined at bedside. Patient is still confused and unable to provide meaningful history  Comfortable   However patient is pleasant  Responds to some commands appropriately  Sitter at bedside        HPI:    Ruthie Howell is a 81 y.o.  female who presents with Fall (left thigh pain)      81-year-old lady with past medical history of hypertension presented to ER after an unwitnessed fall at nursing home. Patient is complaining of left leg pain and complaining of severe pain. Patient historian    Hemoglobin was 10.8. BMP was unremarkable. Blood pressure was 157/86. CT head and cervical spine was negative. X-ray hip shows left femoral neck fracture extending into the medial aspect of the greater trochanter with moderate varus angulation and some degree of overriding. I have personally reviewed the past medical history, past surgical history, medications, social history, and family history, and summarized in the note. Review of Systems:     Limited review of systems      Past Medical History:     Past Medical History:   Diagnosis Date    D[address] [address]     Hypertension         Past Surgical History:     Past Surgical History:   Procedure Laterality Date    APPENDECTOMY      HIP SURGERY Left 11/21/2021    HIP HEMIARTHROPLASTY performed by Tera Cushing, MD at [address] [place] [address]          Medications Prior to Admission:       Prior to Admission medications    Medication Sig Start Date End Date Taking? Authorizing Provider   gabapentin (NEURONTIN) 100 MG capsule Take 100 mg by mouth 3 times daily.    Yes Historical Provider, MD   nitrofurantoin, macrocrystal-monohydrate, (MACROBID) 100 MG capsule Take 100 mg by mouth nightly   Yes Historical Provider, MD   polyethylene glycol (GLYCOLAX) 17 g packet Take 17 g by mouth daily   Yes Historical Provider, MD   simvastatin (ZOCOR) 40 MG tablet Take 40 mg by mouth nightly   Yes Historical Provider, MD   timolol (TIMOPTIC) 0.5 % ophthalmic solution Place 1 drop into both eyes 2 times daily   Yes Historical Provider, MD   traMADol (ULTRAM) 50 MG tablet Take 50 mg by mouth every 8 hours as needed for Pain. Yes Historical Provider, MD   enoxaparin (LOVENOX) 40 MG/0.4ML injection Inject 0.4 mLs into the skin daily for 42 doses 21 Yes Abran Goodwin MD   HYDROcodone-acetaminophen (NORCO) 5-325 MG per tablet Take 1 tablet by mouth every 6 hours as needed for Pain for up to 7 days. Do not exceed 3000 mg of Acetaminophen in 24 hrs. 21 Yes Abran Goodwin MD   citalopram (CELEXA) 10 MG tablet Take 10 mg by mouth daily   Yes Historical Provider, MD        Allergies:       Patient has no known allergies. Social History:     Tobacco:    reports that she has never smoked. She has never used smokeless tobacco.  Alcohol:      reports current alcohol use of about 1.0 standard drink of alcohol per week. Drug Use:  reports no history of drug use.     Family History:     Family History   Problem Relation Age of Onset    No Known Problems Mother     Heart Disease Father          Physical Exam:     Vitals:  BP (!) 105/54   Pulse 82   Temp 97.7 °F (36.5 °C) (Oral)   Resp 16   Ht 5' 6\" (1.676 m)   Wt 109 lb (49.4 kg)   SpO2 93%   BMI 17.59 kg/m²   Temp (24hrs), Av.8 °F (36.6 °C), Min:97.7 °F (36.5 °C), Max:97.9 °F (36.6 °C)          General appearance -sleeping  Mental status -confused  Head - normocephalic and atraumatic  Eyes - pupils equal and reactive, extraocular eye movements intact, conjunctiva clear  Ears - hearing appears to be intact  Nose - no drainage noted  Mouth - mucous membranes moist  Neck - supple, no carotid bruits, thyroid not palpable  Chest - clear to auscultation, normal effort  Heart - normal rate, regular rhythm, no murmur  Abdomen - soft, nontender, nondistended, bowel sounds present all four quadrants, no masses, hepatomegaly or splenomegaly  Neurological -unable to assess  Extremities - peripheral pulses palpable, no pedal edema or calf pain with palpation  Skin - no gross lesions, rashes, or induration noted        Data:     Labs:    Hematology:  Recent Labs     11/23/21  0520 11/24/21  0656 11/25/21  0526   WBC 9.0 8.2 7.5   RBC 3.19* 3.11* 2.79*   HGB 10.0* 9.7* 8.7*   HCT 32.2* 31.2* 27.9*   .9 100.3 100.0   MCH 31.3 31.2 31.2   MCHC 31.1 31.1 31.2   RDW 14.3 14.1 14.1    244 282   MPV 9.4 9.1 9.5     Chemistry:  Recent Labs     11/23/21  0520 11/24/21  0656 11/25/21  0526    139 140   K 4.0 3.8 3.5*    105 105   CO2 28 25 27   GLUCOSE 99 104* 101*   BUN 17 14 13   CREATININE 0.67 0.62 0.70   MG  --   --  2.2   ANIONGAP 8* 9 8*   LABGLOM >60 >60 >60   GFRAA >60 >60 >60   CALCIUM 8.6 8.4* 8.2*     No results for input(s): PROT, LABALBU, LABA1C, U2VTVLU, K4HSTRM, FT4, TSH, AST, ALT, LDH, GGT, ALKPHOS, LABGGT, BILITOT, BILIDIR, AMMONIA, AMYLASE, LIPASE, LACTATE, CHOL, HDL, LDLCHOLESTEROL, CHOLHDLRATIO, TRIG, VLDL, GJH74GY, PHENYTOIN, PHENYF, URICACID, POCGLU in the last 72 hours. Lab Results   Component Value Date    INR 1.2 11/20/2021    PROTIME 14.8 (H) 11/20/2021       Lab Results   Component Value Date/Time    SPECIAL NOT REPORTED 06/19/2021 05:10 PM     Lab Results   Component Value Date/Time    CULTURE NO GROWTH 06/19/2021 05:10 PM       No results found for: POCPH, PHART, PH, POCPCO2, UMK7JET, PCO2, POCPO2, PO2ART, PO2, POCHCO3, EDP5WLD, HCO3, NBEA, PBEA, BEART, BE, THGBART, THB, GRY6PJM, MNRG4ZAD, F9DBKGJS, O2SAT, FIO2    Radiology:    XR FEMUR LEFT (MIN 2 VIEWS)    Result Date: 11/19/2021  Left femoral neck fracture extending into the medial aspect of the greater trochanter with moderate varus angulation and some degree of overriding. Decreased bone density. Moderate atherosclerotic change noted.      CT Head WO Contrast    Result Date: 11/19/2021  No acute intracranial abnormality. CT Cervical Spine WO Contrast    Result Date: 11/19/2021  No acute abnormality of the cervical spine. Cervical spine degenerative changes as detailed above. Possibly significant foraminal stenosis on the right at C3-4 and C5-6. CT HIP LEFT WO CONTRAST    Result Date: 11/19/2021  Left femoral neck fracture as described. Osteoporosis. XR HIP 2-3 VW W PELVIS LEFT    Result Date: 11/19/2021  Left femoral neck fracture extending into the medial aspect of the greater trochanter with moderate varus angulation and some degree of overriding. Decreased bone density. Moderate atherosclerotic change noted. All radiological studies reviewed                Code Status:  Full Code    Electronically signed by Ruby Tsang MD on 11/25/2021 at 12:59 PM     Copy sent to Dr. Vijaya Pop MD    This note was created with the assistance of a speech-recognition program.  Although the intention is to generate a document that actually reflects the content of the visit, no guarantees can be provided that every mistake has been identified and corrected by editing. Note was updated later by me after  physical examination and  completion of the assessment.

## 2021-11-26 LAB
ABSOLUTE EOS #: 0.36 K/UL (ref 0–0.44)
ABSOLUTE IMMATURE GRANULOCYTE: 0.05 K/UL (ref 0–0.3)
ABSOLUTE LYMPH #: 1.12 K/UL (ref 1.1–3.7)
ABSOLUTE MONO #: 0.87 K/UL (ref 0.1–1.2)
BASOPHILS # BLD: 1 % (ref 0–2)
BASOPHILS ABSOLUTE: 0.05 K/UL (ref 0–0.2)
DIFFERENTIAL TYPE: ABNORMAL
EOSINOPHILS RELATIVE PERCENT: 4 % (ref 1–4)
HCT VFR BLD CALC: 30.7 % (ref 36.3–47.1)
HEMOGLOBIN: 9.3 G/DL (ref 11.9–15.1)
IMMATURE GRANULOCYTES: 1 %
LYMPHOCYTES # BLD: 13 % (ref 24–43)
MCH RBC QN AUTO: 30.7 PG (ref 25.2–33.5)
MCHC RBC AUTO-ENTMCNC: 30.3 G/DL (ref 28.4–34.8)
MCV RBC AUTO: 101.3 FL (ref 82.6–102.9)
MONOCYTES # BLD: 10 % (ref 3–12)
NRBC AUTOMATED: 0 PER 100 WBC
PDW BLD-RTO: 14 % (ref 11.8–14.4)
PLATELET # BLD: 316 K/UL (ref 138–453)
PLATELET ESTIMATE: ABNORMAL
PMV BLD AUTO: 9 FL (ref 8.1–13.5)
RBC # BLD: 3.03 M/UL (ref 3.95–5.11)
RBC # BLD: ABNORMAL 10*6/UL
SEG NEUTROPHILS: 71 % (ref 36–65)
SEGMENTED NEUTROPHILS ABSOLUTE COUNT: 5.89 K/UL (ref 1.5–8.1)
WBC # BLD: 8.3 K/UL (ref 3.5–11.3)
WBC # BLD: ABNORMAL 10*3/UL

## 2021-11-26 PROCEDURE — 6360000002 HC RX W HCPCS: Performed by: ORTHOPAEDIC SURGERY

## 2021-11-26 PROCEDURE — 6370000000 HC RX 637 (ALT 250 FOR IP): Performed by: ORTHOPAEDIC SURGERY

## 2021-11-26 PROCEDURE — 36415 COLL VENOUS BLD VENIPUNCTURE: CPT

## 2021-11-26 PROCEDURE — 1200000000 HC SEMI PRIVATE

## 2021-11-26 PROCEDURE — 85025 COMPLETE CBC W/AUTO DIFF WBC: CPT

## 2021-11-26 PROCEDURE — 6370000000 HC RX 637 (ALT 250 FOR IP): Performed by: FAMILY MEDICINE

## 2021-11-26 PROCEDURE — 97530 THERAPEUTIC ACTIVITIES: CPT

## 2021-11-26 PROCEDURE — 6370000000 HC RX 637 (ALT 250 FOR IP): Performed by: PSYCHIATRY & NEUROLOGY

## 2021-11-26 PROCEDURE — 97535 SELF CARE MNGMENT TRAINING: CPT

## 2021-11-26 RX ORDER — HYDRALAZINE HYDROCHLORIDE 10 MG/1
10 TABLET, FILM COATED ORAL EVERY 6 HOURS PRN
Status: DISCONTINUED | OUTPATIENT
Start: 2021-11-26 | End: 2021-11-28 | Stop reason: HOSPADM

## 2021-11-26 RX ORDER — AMLODIPINE BESYLATE 2.5 MG/1
2.5 TABLET ORAL DAILY
Status: DISCONTINUED | OUTPATIENT
Start: 2021-11-26 | End: 2021-11-28 | Stop reason: HOSPADM

## 2021-11-26 RX ADMIN — ENOXAPARIN SODIUM 40 MG: 100 INJECTION SUBCUTANEOUS at 08:32

## 2021-11-26 RX ADMIN — AMLODIPINE BESYLATE 2.5 MG: 2.5 TABLET ORAL at 18:10

## 2021-11-26 RX ADMIN — ACETAMINOPHEN 650 MG: 325 TABLET ORAL at 08:31

## 2021-11-26 RX ADMIN — HYDROCODONE BITARTRATE AND ACETAMINOPHEN 2 TABLET: 5; 325 TABLET ORAL at 03:25

## 2021-11-26 RX ADMIN — DONEPEZIL HYDROCHLORIDE 10 MG: 10 TABLET, FILM COATED ORAL at 19:47

## 2021-11-26 RX ADMIN — HYDRALAZINE HYDROCHLORIDE 10 MG: 10 TABLET, FILM COATED ORAL at 19:47

## 2021-11-26 RX ADMIN — CITALOPRAM HYDROBROMIDE 10 MG: 10 TABLET ORAL at 08:31

## 2021-11-26 RX ADMIN — QUETIAPINE FUMARATE 50 MG: 25 TABLET ORAL at 19:47

## 2021-11-26 ASSESSMENT — PAIN DESCRIPTION - LOCATION
LOCATION: HIP
LOCATION: HIP

## 2021-11-26 ASSESSMENT — PAIN DESCRIPTION - FREQUENCY
FREQUENCY: CONTINUOUS
FREQUENCY: CONTINUOUS

## 2021-11-26 ASSESSMENT — PAIN SCALES - GENERAL
PAINLEVEL_OUTOF10: 3
PAINLEVEL_OUTOF10: 2
PAINLEVEL_OUTOF10: 6

## 2021-11-26 ASSESSMENT — PAIN DESCRIPTION - PAIN TYPE
TYPE: SURGICAL PAIN
TYPE: SURGICAL PAIN

## 2021-11-26 ASSESSMENT — PAIN DESCRIPTION - DESCRIPTORS
DESCRIPTORS: ACHING;CONSTANT;DISCOMFORT
DESCRIPTORS: DISCOMFORT;SORE

## 2021-11-26 ASSESSMENT — PAIN DESCRIPTION - PROGRESSION: CLINICAL_PROGRESSION: NOT CHANGED

## 2021-11-26 ASSESSMENT — PAIN DESCRIPTION - ONSET
ONSET: ON-GOING
ONSET: ON-GOING

## 2021-11-26 ASSESSMENT — PAIN - FUNCTIONAL ASSESSMENT: PAIN_FUNCTIONAL_ASSESSMENT: PREVENTS OR INTERFERES SOME ACTIVE ACTIVITIES AND ADLS

## 2021-11-26 ASSESSMENT — PAIN DESCRIPTION - ORIENTATION
ORIENTATION: LEFT
ORIENTATION: LEFT

## 2021-11-26 NOTE — PROGRESS NOTES
Occupational Therapy  Facility/Department: [facility] MED SURG  Daily Treatment Note  NAME: Dewey Warren  : 1935  MRN: [id number]    Date of Service: 2021    Discharge Recommendations:  Patient would benefit from continued therapy after discharge   Pt currently functioning below baseline. Would suggest additional therapy at time of discharge to maximize long term outcomes and prevent re-admission. Please refer to AM-PAC score for current level of function. Assessment   Performance deficits / Impairments: Decreased functional mobility ; Decreased safe awareness; Decreased balance; Decreased coordination; Decreased ADL status; Decreased cognition; Decreased vision/visual deficit; Decreased posture; Decreased endurance; Decreased high-level IADLs; Decreased strength; Decreased fine motor control  Assessment: At this time, pt is a high fall risk with 2 staff assist required when up and use of jessica stedy. Pt is limited by cognitive deficits and pain. Skilled OT is indicated for maximizing functional I and safety as able to reduce caregiver assist/burden. Prognosis: Poor  REQUIRES OT FOLLOW UP: Yes  Activity Tolerance  Activity Tolerance: Patient limited by pain; Treatment limited secondary to decreased cognition  Activity Tolerance: poor  Safety Devices  Safety Devices in place: Yes  Type of devices: All fall risk precautions in place; Left in bed; Bed alarm in place; Call light within reach; Nurse notified; Gait belt; Patient at risk for falls         Patient Diagnosis(es): The encounter diagnosis was Closed fracture of neck of left femur, initial encounter ([facility] [address] has a past medical history of Dementia ([facility] [address] and Hypertension. has a past surgical history that includes Tubal ligation; Appendectomy; and hip surgery (Left, 2021).     Restrictions  Restrictions/Precautions  Restrictions/Precautions: General Precautions, Fall Risk, Surgical Protocols, Up as Tolerated  Required Braces or Orthoses?: Yes (ABD pillow)  Position Activity Restriction  Hip Precautions: Posterior hip precautions, No hip flexion > 90 degrees, No ADduction, No hip internal rotation  Other position/activity restrictions: FWB, heels off bed at all times, ABD pillow, posterior hip precautions, confusion, alarms, ZURDO STEDY  Subjective   General  Chart Reviewed: Yes  Patient assessed for rehabilitation services?: Yes  Response to previous treatment: Patient with no complaints from previous session  Family / Caregiver Present: Yes (son and granddaughter)  Subjective  Subjective: Pt in bed with son and granddaughter present. Pt confused and limited by pain. Orientation  Orientation  Overall Orientation Status: Impaired  Orientation Level: Oriented to person; Disoriented to place; Disoriented to time; Disoriented to situation  Objective             Balance  Sitting Balance: Minimal assistance  Standing Balance: Unable to assess(comment)  Functional Mobility  Assist Level: Dependent/Total  Bed mobility  Supine to Sit: Dependent/Total; 2 Person assistance  Sit to Supine: Dependent/Total; 2 Person assistance  Scooting: Dependent/Total; 2 Person assistance  Comment: Pt limited by cognitive deficits and pain, unable to functionally participate in bed mobility. Transfers  Sit to stand: Unable to assess  Stand to sit: Unable to assess  Transfer Comments: Many attempt made to complete sit to stand from EOB using ZURDO STEDY with NO success. Pt physically resistive and limited by pain and cognition. Not safe to get out of bed at thhis time. Cognition  Overall Cognitive Status: Exceptions  Arousal/Alertness: Delayed responses to stimuli; Inconsistent responses to stimuli  Following Commands: Inconsistently follows commands; Follows one step commands with repetition; Follows one step commands with increased time; Does not follow commands  Attention Span: Difficulty attending to directions;  Difficulty dividing attention; Attends with cues to redirect  Memory: Decreased short term memory; Decreased recall of recent events; Decreased long term memory; Decreased recall of precautions; Decreased recall of biographical Information  Safety Judgement: Decreased awareness of need for assistance; Decreased awareness of need for safety  Problem Solving: Assistance required to generate solutions; Assistance required to implement solutions; Assistance required to correct errors made; Assistance required to identify errors made; Decreased awareness of errors  Insights: Not aware of deficits  Initiation: Requires cues for all  Sequencing: Requires cues for all     Perception  Overall Perceptual Status: Impaired  Unilateral Attention: Cues to maintain midline in sitting  Initiation: Hand over hand to initiate tasks                                   Plan   Plan  Times per week: 4-5x/week 1x/day as neel  Times per day: Daily  Current Treatment Recommendations: Strengthening, Balance Training, Functional Mobility Training, Safety Education & Training, Positioning, Endurance Training, Neuromuscular Re-education, Patient/Caregiver Education & Training, Self-Care / ADL, Equipment Evaluation, Education, & procurement, Cognitive Reorientation, Pain Management, Home Management Training, Cognitive/Perceptual Training                        AM-PAC Score        AM-[facility] Inpatient Daily Activity Raw Score: 7 (11/26/21 0952)  AM-PAC Inpatient ADL T-Scale Score : 20.13 (11/26/21 0952)  ADL Inpatient CMS 0-100% Score: 92.44 (11/26/21 0952)  ADL Inpatient CMS G-Code Modifier : CM (11/26/21 2115)    Goals  Short term goals  Time Frame for Short term goals: by discharge, pt demo  Short term goal 1: bed mob tasks with use of rail as needed to max assist of 1. Short term goal 2: increase BUE strength by a 1/2 grade to assist with self care tasks.   Short term goal 3: UB ADL to set up/min assist and LB ADL to max assist of 1 supine in bed and with use of rail as needed. Short term goal 4: postural control to EOB and min assist neel > 20 mins as needed to increase core strength/reduce falls with self care. Short term goal 5: ADL transfers with use of AD/lift as needed to max assist of 1 (add functional mob to POC as able). Long term goals  Long term goal 1: Pt to stand with mod assist of 1 neel > 4 mins as able to reduce falls in function. Long term goal 2: Pt to complete toileting tasks with use of BSC/AD and mod assist of 1. Long term goal 3: Caregivers to be I with pressure relief, BUE HEP, EC/WS and fall prevention tech as well as DME/AE and AD recommendations with use of handouts. Patient Goals   Patient goals : pt unable to state due to cognitive deficits       Therapy Time   Individual Concurrent Group Co-treatment   Time In       0915   Time Out       0938   Minutes       21     Co-treatment with PT warranted secondary to decreased safety and independence requiring 2 skilled therapy professionals to address individual discipline's goals. OT addressing \"preparation for ADL transfer\",\"sitting balance for increased ADL performance\",\"sitting/activity tolerance\",\"functional reaching\",\"environmental safety/scanning\",\"fall prevention\",\"functional mobility for ADL transfers\",\"ability to sequence and follow directions\",\"functional UE strength\".          CLAUDETTE Hart

## 2021-11-26 NOTE — FLOWSHEET NOTE
11/26/21 1507   Vital Signs   Pulse 85   BP (!) 174/76   Dr. Idalia Mays updated on above VS  See orders for Amlodipine and PRN Hydralazine

## 2021-11-26 NOTE — CARE COORDINATION
Social Work-[employer] has not received precert. Son is considering hospice care with [facility]. The phone is [phone]. Son would like to speak with doctor regarding best plan for patient. Patient may benefit from Palliative Care consult. If patient goes with hospice his preference would be [facility]. He is also considering her returning to [facility] that she is total care and may not be appropriate. Phone call to Maria Luz Ferrer with [facility] ([email or web address])). Left message.  [facility]

## 2021-11-26 NOTE — PROGRESS NOTES
Physical Therapy  Facility/Department: [facility] MED SURG  Daily Treatment Note  NAME: Ruthie Howell  : 1935  MRN: [id number]    Date of Service: 2021   CHUCKIE Mayer reports patient is medically stable for therapy treatment this date. Chart reviewed prior to treatment and patient is agreeable for therapy. All lines intact and patient positioned comfortably at end of treatment. All patient needs addressed prior to ending therapy session. Discharge Recommendations:  Pt currently functioning below baseline. Would suggest additional therapy at time of discharge to maximize long term outcomes and prevent re-admission. Please refer to AM-PAC score for current level of function. Patient would benefit from continued therapy after discharge      Assessment   Body structures, Functions, Activity limitations: Decreased functional mobility ; Decreased cognition; Decreased posture; Decreased ADL status; Decreased endurance; Decreased ROM; Decreased strength; Increased pain; Decreased balance  Assessment: Pt tolerated session poor. Pt attempted to  [address] [place] [address] stedy multiple times this date however limited significantly by decreased cognition. Pt w/ limited active participation throughout. Pt is a HIGH fall risk d/t decreased cognition, decreased strength, and decreased mobility. Pt currently requires skilled 2 person assist for safe functional mobility. Pt would benefit from continued skilled physical therapy to address these deficits in order to maximize independence w/ functional mobility. Prognosis: Good  Decision Making: High Complexity  PT Education: Goals; PT Role; Plan of Care; Functional Mobility Training; Transfer Training; General Safety; Orientation  Patient Education: Pt educated on importance of continued mobility, use of ABD pillow, importance of being OOB, prevention of sedentary complications, and pursed lip breathing. No evidence of learning.   Pt requires continued reinforcement of education. REQUIRES PT FOLLOW UP: Yes  Activity Tolerance  Activity Tolerance: Patient limited by cognitive status; Patient limited by endurance     Patient Diagnosis(es): The encounter diagnosis was Closed fracture of neck of left femur, initial encounter ([facility] [address] has a past medical history of Dementia ([facility] [address] and Hypertension. has a past surgical history that includes Tubal ligation; Appendectomy; and hip surgery (Left, 11/21/2021). Restrictions  Restrictions/Precautions  Restrictions/Precautions: General Precautions, Fall Risk, Surgical Protocols, Up as Tolerated  Required Braces or Orthoses?: Yes (ABD pillow)  Position Activity Restriction  Hip Precautions: Posterior hip precautions, No hip flexion > 90 degrees, No ADduction, No hip internal rotation  Other position/activity restrictions: FWB, heels off bed at all times, ABD pillow, posterior hip precautions, confusion, alarms, ZURDO STEDY  Subjective   General  Response To Previous Treatment: Patient with no complaints from previous session. Family / Caregiver Present: Yes (son and granddaughter present throughout session)  Subjective  Subjective: Pt having pain w/ movments, very lethargic. General Comment  Comments: RN and pt agreeable to therapy. Pt supine in bed upon arrival.  Pt requiring encouragement throughout. Orientation  Orientation  Overall Orientation Status: Impaired  Orientation Level: Disoriented to place; Disoriented to time; Disoriented to situation  Cognition   Cognition  Overall Cognitive Status: Exceptions  Arousal/Alertness: Delayed responses to stimuli; Inconsistent responses to stimuli  Following Commands: Inconsistently follows commands; Follows one step commands with repetition; Follows one step commands with increased time; Does not follow commands  Attention Span: Difficulty attending to directions; Difficulty dividing attention;  Attends with cues to redirect  Memory: Decreased short term memory; Decreased recall of sec.= fall risk)    Goals  Short term goals  Time Frame for Short term goals: 12 visits  Short term goal 1: Inc bed mobility to [employer] term goal 2: Pt able to transfer from bed to chair with min assist using safest device  Short term goal 3: Pt to tolerate sitting EOB up to 30 minutes with UB/LB support to improve core stability & repositioning for pressure relief & prevent sedentary complications  Short term goal 4: Pt able to tolerate 30-40 min of activity to include ex, NMR & functional to facilitate activity tolerance to [address]  Times per week: 1-2x/D, 5-6D/week  Current Treatment Recommendations: Strengthening, ROM, Balance Training, Functional Mobility Training, Transfer Training, Endurance Training, ADL/Self-care Training, Gait Training, Safety Education & Training, Patient/Caregiver Education & Training, Equipment Evaluation, Education, & procurement  Safety Devices  Type of devices: Bed alarm in place, Call light within reach, Gait belt, Nurse notified, Left in bed  Restraints  Initially in place: No     Therapy Time   Individual Concurrent Group Co-treatment   Time In 0914         Time Out 0938         Minutes 24              Co-treatment with OT warranted secondary to decreased safety and independence requiring 2 skilled therapy professionals to address individual discipline's goals.      Nav Silva, PT

## 2021-11-26 NOTE — PROGRESS NOTES
Comprehensive Nutrition Assessment    Type and Reason for Visit:  RD Nutrition Re-Screen/LOS    Nutrition Recommendations/Plan:   1. Continue Regular diet  2. Start Ensure Enlive 2x/day  3. Monitor p.o intakes and labs    Nutrition Assessment:  Patient assessed for length of stay. Patient admission is related to left femeral neck fracture. Patient is status post left hip hemiarthroplasty (11/21/21). Patient is underweight. Will start Ensure Enlive 2x/day. Monitor p.o intakes and labs. Malnutrition Assessment:  Malnutrition Status: At risk for malnutrition (Comment)      Estimated Daily Nutrient Needs:  Energy (kcal):  2368-6533 kcal (25-28 kcal/kg); Weight Used for Energy Requirements:  Current     Protein (g):  59-69 gm (1.2-1.4 gm/kg); Weight Used for Protein Requirements:  Current            Nutrition Related Findings:  Status post left hip hemiarthroplasty      Wounds:  Surgical Incision       Current Nutrition Therapies:    ADULT DIET;  Regular    Anthropometric Measures:  · Height: 5' 6\" (167.6 cm)  · Current Body Weight: 109 lb (49.4 kg)   · Admission Body Weight: 109 lb (49.4 kg)    · Ideal Body Weight: 130 lbs; % Ideal Body Weight 83.8 %   · BMI: 17.6  · BMI Categories: Underweight (BMI less than 22) age over 72       Nutrition Diagnosis:   · Increased nutrient needs related to increase demand for energy/nutrients as evidenced by wounds (left hip surgery)      Nutrition Interventions:   Food and/or Nutrient Delivery:  Continue Current Diet, Start Oral Nutrition Supplement  Nutrition Education/Counseling:  Education not indicated   Coordination of Nutrition Care:  Continue to monitor while inpatient    Goals:  PO intakes are greater than 75% at meals       Nutrition Monitoring and Evaluation:   Food/Nutrient Intake Outcomes:  Supplement Intake, Food and Nutrient Intake  Physical Signs/Symptoms Outcomes:  Biochemical Data, Fluid Status or Edema, Skin, Weight     Discharge Planning:    Continue current diet, Continue Oral Nutrition Supplement           Lynne De Luna  MFN, RDN, LDN  Lead Clinical Dietitian  RD Office Phone [phone]

## 2021-11-27 LAB
ABSOLUTE EOS #: 0.2 K/UL (ref 0–0.44)
ABSOLUTE IMMATURE GRANULOCYTE: 0.06 K/UL (ref 0–0.3)
ABSOLUTE LYMPH #: 1 K/UL (ref 1.1–3.7)
ABSOLUTE MONO #: 0.9 K/UL (ref 0.1–1.2)
ANION GAP SERPL CALCULATED.3IONS-SCNC: 11 MMOL/L (ref 9–17)
BASOPHILS # BLD: 0 % (ref 0–2)
BASOPHILS ABSOLUTE: 0.03 K/UL (ref 0–0.2)
BUN BLDV-MCNC: 15 MG/DL (ref 8–23)
BUN/CREAT BLD: 25 (ref 9–20)
CALCIUM SERPL-MCNC: 8.2 MG/DL (ref 8.6–10.4)
CHLORIDE BLD-SCNC: 103 MMOL/L (ref 98–107)
CO2: 26 MMOL/L (ref 20–31)
CREAT SERPL-MCNC: 0.61 MG/DL (ref 0.5–0.9)
DIFFERENTIAL TYPE: ABNORMAL
EOSINOPHILS RELATIVE PERCENT: 2 % (ref 1–4)
GFR AFRICAN AMERICAN: >60 ML/MIN
GFR NON-AFRICAN AMERICAN: >60 ML/MIN
GFR SERPL CREATININE-BSD FRML MDRD: ABNORMAL ML/MIN/{1.73_M2}
GFR SERPL CREATININE-BSD FRML MDRD: ABNORMAL ML/MIN/{1.73_M2}
GLUCOSE BLD-MCNC: 98 MG/DL (ref 70–99)
HCT VFR BLD CALC: 29.4 % (ref 36.3–47.1)
HEMOGLOBIN: 9.3 G/DL (ref 11.9–15.1)
IMMATURE GRANULOCYTES: 1 %
LYMPHOCYTES # BLD: 11 % (ref 24–43)
MCH RBC QN AUTO: 31.4 PG (ref 25.2–33.5)
MCHC RBC AUTO-ENTMCNC: 31.6 G/DL (ref 28.4–34.8)
MCV RBC AUTO: 99.3 FL (ref 82.6–102.9)
MONOCYTES # BLD: 10 % (ref 3–12)
NRBC AUTOMATED: 0 PER 100 WBC
PDW BLD-RTO: 13.6 % (ref 11.8–14.4)
PLATELET # BLD: 336 K/UL (ref 138–453)
PLATELET ESTIMATE: ABNORMAL
PMV BLD AUTO: 8.5 FL (ref 8.1–13.5)
POTASSIUM SERPL-SCNC: 4 MMOL/L (ref 3.7–5.3)
RBC # BLD: 2.96 M/UL (ref 3.95–5.11)
RBC # BLD: ABNORMAL 10*6/UL
SEG NEUTROPHILS: 76 % (ref 36–65)
SEGMENTED NEUTROPHILS ABSOLUTE COUNT: 6.91 K/UL (ref 1.5–8.1)
SODIUM BLD-SCNC: 140 MMOL/L (ref 135–144)
WBC # BLD: 9.1 K/UL (ref 3.5–11.3)
WBC # BLD: ABNORMAL 10*3/UL

## 2021-11-27 PROCEDURE — 6370000000 HC RX 637 (ALT 250 FOR IP): Performed by: PSYCHIATRY & NEUROLOGY

## 2021-11-27 PROCEDURE — 97530 THERAPEUTIC ACTIVITIES: CPT

## 2021-11-27 PROCEDURE — 1200000000 HC SEMI PRIVATE

## 2021-11-27 PROCEDURE — 36415 COLL VENOUS BLD VENIPUNCTURE: CPT

## 2021-11-27 PROCEDURE — 80048 BASIC METABOLIC PNL TOTAL CA: CPT

## 2021-11-27 PROCEDURE — 6370000000 HC RX 637 (ALT 250 FOR IP): Performed by: ORTHOPAEDIC SURGERY

## 2021-11-27 PROCEDURE — 85025 COMPLETE CBC W/AUTO DIFF WBC: CPT

## 2021-11-27 PROCEDURE — 6360000002 HC RX W HCPCS: Performed by: ORTHOPAEDIC SURGERY

## 2021-11-27 PROCEDURE — 6370000000 HC RX 637 (ALT 250 FOR IP): Performed by: FAMILY MEDICINE

## 2021-11-27 PROCEDURE — 97535 SELF CARE MNGMENT TRAINING: CPT

## 2021-11-27 RX ADMIN — ACETAMINOPHEN 650 MG: 325 TABLET ORAL at 21:31

## 2021-11-27 RX ADMIN — AMLODIPINE BESYLATE 2.5 MG: 2.5 TABLET ORAL at 07:24

## 2021-11-27 RX ADMIN — DONEPEZIL HYDROCHLORIDE 10 MG: 10 TABLET, FILM COATED ORAL at 21:30

## 2021-11-27 RX ADMIN — ENOXAPARIN SODIUM 40 MG: 100 INJECTION SUBCUTANEOUS at 07:24

## 2021-11-27 RX ADMIN — ACETAMINOPHEN 650 MG: 325 TABLET ORAL at 13:30

## 2021-11-27 RX ADMIN — QUETIAPINE FUMARATE 50 MG: 25 TABLET ORAL at 21:30

## 2021-11-27 RX ADMIN — ACETAMINOPHEN 650 MG: 325 TABLET ORAL at 07:23

## 2021-11-27 RX ADMIN — CITALOPRAM HYDROBROMIDE 10 MG: 10 TABLET ORAL at 07:24

## 2021-11-27 ASSESSMENT — PAIN DESCRIPTION - ORIENTATION
ORIENTATION: LEFT
ORIENTATION: LEFT

## 2021-11-27 ASSESSMENT — PAIN DESCRIPTION - LOCATION
LOCATION: HIP
LOCATION: HIP

## 2021-11-27 ASSESSMENT — PAIN SCALES - WONG BAKER: WONGBAKER_NUMERICALRESPONSE: 2

## 2021-11-27 ASSESSMENT — PAIN DESCRIPTION - PAIN TYPE
TYPE: SURGICAL PAIN
TYPE: SURGICAL PAIN

## 2021-11-27 ASSESSMENT — PAIN DESCRIPTION - DESCRIPTORS
DESCRIPTORS: ACHING;DISCOMFORT
DESCRIPTORS: ACHING;DISCOMFORT

## 2021-11-27 ASSESSMENT — PAIN SCALES - GENERAL
PAINLEVEL_OUTOF10: 3
PAINLEVEL_OUTOF10: 3

## 2021-11-27 ASSESSMENT — PAIN DESCRIPTION - PROGRESSION
CLINICAL_PROGRESSION: NOT CHANGED
CLINICAL_PROGRESSION: NOT CHANGED

## 2021-11-27 ASSESSMENT — PAIN DESCRIPTION - ONSET
ONSET: ON-GOING
ONSET: ON-GOING

## 2021-11-27 ASSESSMENT — PAIN DESCRIPTION - FREQUENCY
FREQUENCY: CONTINUOUS
FREQUENCY: CONTINUOUS

## 2021-11-27 NOTE — PROGRESS NOTES
Progress note  [facility]    Adult Hospitalist      Name: Vick Rodrigues  MRN: [id number]     [place]: [de-identified]  Room: 2001/2001-02    Admit Date: 11/19/2021  1:00 AM  PCP: Michael Olivera MD    Primary Problem  Active Problems:    Left displaced femoral neck fracture ([address]  Resolved Problems:    * No resolved hospital problems. *        Assesment:       · Left displaced femoral neck fracture  · Essential hypertension  · Major depressive disorder  · Advanced Dementia  · Vitamin D deficiency  · Agitation  · Reduced urine output- improved  · Anemia unspecified            Plan:     · Admit to MedSur telemetry  · O2 maintain oxygen saturation greater than 92%. · Pain control  · Orthopedic consult, Open treatment of Left displaced femoral neck fracture with prosthetic replacement on 11/21/21  · Got clearance from cardiology  · PT OT  · Continue to monitor/telemetry/CBC with differential daily/BMP daily  · Ativan--avoid, also avoid narcotics  · Seroquel per neurology  · DVT and GI prophylaxis. · SNF bed request will be canceled  · Consult Hospice  · D/w son   · All questions answered   · Encourage p.o.   · Change labs to every other day in hospital  · D/w RN  · D/w SW  · Family conference-discussed patient condition, quality of life, therapeutic and other options for the future  · Son is medical POA who wishes institutional hospice  · Patient may be admitted to [facility]  Continue medications as below      Scheduled Meds:   amLODIPine  2.5 mg Oral Daily    donepezil  10 mg Oral Nightly    QUEtiapine  50 mg Oral Nightly    citalopram  10 mg Oral Daily    sodium chloride flush  10 mL IntraVENous 2 times per day    enoxaparin  40 mg SubCUTAneous Daily     Continuous Infusions:   dextrose 5 % and 0.45 % NaCl Stopped (11/25/21 1023)    sodium chloride 125 mL/hr at 11/21/21 1254     PRN Meds:  hydrALAZINE, 10 mg, Q6H PRN  HYDROcodone 5 mg - acetaminophen, 1 tablet, Q6H PRN  HYDROcodone 5 mg - acetaminophen, 2 tablet, Q6H PRN  morphine, 2 mg, Q4H PRN  sodium chloride flush, 10 mL, PRN  sodium chloride, 25 mL, PRN  ondansetron, 4 mg, Q8H PRN   Or  ondansetron, 4 mg, Q6H PRN  magnesium hydroxide, 30 mL, Daily PRN  acetaminophen, 650 mg, Q6H PRN   Or  acetaminophen, 650 mg, Q6H PRN  potassium chloride, 40 mEq, PRN   Or  potassium alternative oral replacement, 40 mEq, PRN   Or  potassium chloride, 10 mEq, PRN        Chief Complaint:     Chief Complaint   Patient presents with    Fall     left thigh pain         History of Present Illness:      Patient seen and examined at bedside. Patient is still confused and unable to provide meaningful history  Comfortable   However patient is pleasant  Responds to some commands appropriately  Sitter/ family  at bedside  Extensive discussion with family, RN and social work  Spent more than 35 minutes        HPI:    Kiki Iglesias is a 81 y.o.  female who presents with Fall (left thigh pain)      36-year-old lady with past medical history of hypertension presented to ER after an unwitnessed fall at nursing home. Patient is complaining of left leg pain and complaining of severe pain. Patient historian    Hemoglobin was 10.8. BMP was unremarkable. Blood pressure was 157/86. CT head and cervical spine was negative. X-ray hip shows left femoral neck fracture extending into the medial aspect of the greater trochanter with moderate varus angulation and some degree of overriding. I have personally reviewed the past medical history, past surgical history, medications, social history, and family history, and summarized in the note.     Review of Systems:     Limited review of systems      Past Medical History:     Past Medical History:   Diagnosis Date    Dementia ([address]     Hypertension         Past Surgical History:     Past Surgical History:   Procedure Laterality Date    APPENDECTOMY      HIP SURGERY Left 11/21/2021    HIP HEMIARTHROPLASTY performed by Teri Cobos Shadi Wilson MD at [address]          Medications Prior to Admission:       Prior to Admission medications    Medication Sig Start Date End Date Taking? Authorizing Provider   gabapentin (NEURONTIN) 100 MG capsule Take 100 mg by mouth 3 times daily. Yes Historical Provider, MD   nitrofurantoin, macrocrystal-monohydrate, (MACROBID) 100 MG capsule Take 100 mg by mouth nightly   Yes Historical Provider, MD   polyethylene glycol (GLYCOLAX) 17 g packet Take 17 g by mouth daily   Yes Historical Provider, MD   simvastatin (ZOCOR) 40 MG tablet Take 40 mg by mouth nightly   Yes Historical Provider, MD   timolol (TIMOPTIC) 0.5 % ophthalmic solution Place 1 drop into both eyes 2 times daily   Yes Historical Provider, MD   traMADol (ULTRAM) 50 MG tablet Take 50 mg by mouth every 8 hours as needed for Pain. Yes Historical Provider, MD   enoxaparin (LOVENOX) 40 MG/0.4ML injection Inject 0.4 mLs into the skin daily for 42 doses 21 Yes Nu Duggan MD   HYDROcodone-acetaminophen (NORCO) 5-325 MG per tablet Take 1 tablet by mouth every 6 hours as needed for Pain for up to 7 days. Do not exceed 3000 mg of Acetaminophen in 24 hrs. 21 Yes Nu Duggan MD   citalopram (CELEXA) 10 MG tablet Take 10 mg by mouth daily   Yes Historical Provider, MD        Allergies:       Patient has no known allergies. Social History:     Tobacco:    reports that she has never smoked. She has never used smokeless tobacco.  Alcohol:      reports current alcohol use of about 1.0 standard drink of alcohol per week. Drug Use:  reports no history of drug use.     Family History:     Family History   Problem Relation Age of Onset    No Known Problems Mother     Heart Disease Father          Physical Exam:     Vitals:  BP (!) 97/42   Pulse 78   Temp 97.8 °F (36.6 °C) (Axillary)   Resp 16   Ht 5' 6\" (1.676 m)   Wt 109 lb (49.4 kg)   SpO2 94%   BMI 17.59 kg/m²   Temp (24hrs), Av.3 °F (36.8 °C), Min:97.8 °F (36.6 °C), Max:98.7 °F (37.1 °C)          General appearance -sleeping  Mental status -confused  Head - normocephalic and atraumatic  Eyes - pupils equal and reactive, extraocular eye movements intact, conjunctiva clear  Ears - hearing appears to be intact  Nose - no drainage noted  Mouth - mucous membranes moist  Neck - supple, no carotid bruits, thyroid not palpable  Chest - clear to auscultation, normal effort  Heart - normal rate, regular rhythm, no murmur  Abdomen - soft, nontender, nondistended, bowel sounds present all four quadrants, no masses, hepatomegaly or splenomegaly  Neurological -unable to assess  Extremities - peripheral pulses palpable, no pedal edema or calf pain with palpation  Skin - no gross lesions, rashes, or induration noted        Data:     Labs:    Hematology:  Recent Labs     11/25/21 0526 11/26/21  0704 11/27/21  0644   WBC 7.5 8.3 9.1   RBC 2.79* 3.03* 2.96*   HGB 8.7* 9.3* 9.3*   HCT 27.9* 30.7* 29.4*   .0 101.3 99.3   MCH 31.2 30.7 31.4   MCHC 31.2 30.3 31.6   RDW 14.1 14.0 13.6    316 336   MPV 9.5 9.0 8.5     Chemistry:  Recent Labs     11/25/21 0526 11/27/21  0644    140   K 3.5* 4.0    103   CO2 27 26   GLUCOSE 101* 98   BUN 13 15   CREATININE 0.70 0.61   MG 2.2  --    ANIONGAP 8* 11   LABGLOM >60 >60   GFRAA >60 >60   CALCIUM 8.2* 8.2*     No results for input(s): PROT, LABALBU, LABA1C, U4RSAIM, Y6DFFQY, FT4, TSH, AST, ALT, LDH, GGT, ALKPHOS, LABGGT, BILITOT, BILIDIR, AMMONIA, AMYLASE, LIPASE, LACTATE, CHOL, HDL, LDLCHOLESTEROL, CHOLHDLRATIO, TRIG, VLDL, VSQ45JI, PHENYTOIN, PHENYF, URICACID, POCGLU in the last 72 hours.     Lab Results   Component Value Date    INR 1.2 11/20/2021    PROTIME 14.8 (H) 11/20/2021       Lab Results   Component Value Date/Time    SPECIAL NOT REPORTED 06/19/2021 05:10 PM     Lab Results   Component Value Date/Time    CULTURE NO GROWTH 06/19/2021 05:10 PM       No results found for: POCPH, [address], [address], POCPCO2, SPB2PDM, PCO2, POCPO2, PO2ART, PO2, POCHCO3, TVU9EFZ, HCO3, NBEA, PBEA, BEART, BE, THGBART, THB, EHU3EUW, MTHM2KON, V4MRLPEC, O2SAT, FIO2    Radiology:    XR FEMUR LEFT (MIN 2 VIEWS)    Result Date: 11/19/2021  Left femoral neck fracture extending into the medial aspect of the greater trochanter with moderate varus angulation and some degree of overriding. Decreased bone density. Moderate atherosclerotic change noted. CT Head WO Contrast    Result Date: 11/19/2021  No acute intracranial abnormality. CT Cervical Spine WO Contrast    Result Date: 11/19/2021  No acute abnormality of the cervical spine. Cervical spine degenerative changes as detailed above. Possibly significant foraminal stenosis on the right at C3-4 and C5-6. CT HIP LEFT WO CONTRAST    Result Date: 11/19/2021  Left femoral neck fracture as described. Osteoporosis. XR HIP 2-3 VW W PELVIS LEFT    Result Date: 11/19/2021  Left femoral neck fracture extending into the medial aspect of the greater trochanter with moderate varus angulation and some degree of overriding. Decreased bone density. Moderate atherosclerotic change noted. All radiological studies reviewed                Code Status:  Full Code    Electronically signed by Ally Sullivan MD on 11/27/2021 at 3:30 PM     Copy sent to Dr. Arnoldo Turk MD    This note was created with the assistance of a speech-recognition program.  Although the intention is to generate a document that actually reflects the content of the visit, no guarantees can be provided that every mistake has been identified and corrected by editing. Note was updated later by me after  physical examination and  completion of the assessment.

## 2021-11-27 NOTE — PROGRESS NOTES
Occupational Therapy  Facility/Department: [facility] MED SURG  Daily Treatment Note  NAME: Ana Erickson  : 1935  MRN: [id number]    Date of Service: 2021    Discharge Recommendations:  Patient would benefit from continued therapy after discharge     Pt currently functioning below baseline. Would suggest additional therapy at time of discharge to maximize long term outcomes and prevent re-admission. Please refer to AM-PAC score for current level of function. Assessment   Performance deficits / Impairments: Decreased functional mobility ; Decreased safe awareness; Decreased balance; Decreased coordination; Decreased ADL status; Decreased cognition; Decreased vision/visual deficit; Decreased posture; Decreased endurance; Decreased high-level IADLs; Decreased strength; Decreased fine motor control  Assessment: At this time, pt is a high fall risk with 2 staff assist required when up and use of jessica stedy. Pt is limited by cognitive deficits and pain. Skilled OT is indicated for maximizing functional I and safety as able to reduce caregiver assist/burden. Prognosis: Poor  OT Education: Orientation; Precautions; Plan of Care; Energy Conservation; Transfer Training; OT Role  Patient Education: safety in function, call light use/fall prevention, pursed lip breathing, proper bed mob tech, postural control and weight shifting, recommendations for continued therapy and benefits of being up OOB as able  Barriers to Learning: memory and cognitive deficits  REQUIRES OT FOLLOW UP: Yes  Activity Tolerance  Activity Tolerance: Patient limited by pain; Treatment limited secondary to decreased cognition  Safety Devices  Safety Devices in place: Yes  Type of devices: All fall risk precautions in place;  Left in bed; Bed alarm in place; Call light within reach; Nurse notified; Gait belt; Patient at risk for falls         Patient Diagnosis(es): The encounter diagnosis was Closed fracture of neck of left femur, initial encounter [facility]). has a past medical history of Dementia ([address] and Hypertension. has a past surgical history that includes Tubal ligation; Appendectomy; and hip surgery (Left, 11/21/2021). Restrictions  Restrictions/Precautions  Restrictions/Precautions: General Precautions, Fall Risk, Surgical Protocols, Up as Tolerated  Required Braces or Orthoses?: Yes  Position Activity Restriction  Hip Precautions: Posterior hip precautions, No hip flexion > 90 degrees, No ADduction, No hip internal rotation  Other position/activity restrictions: FWB, heels off bed at all times, ABD pillow, posterior hip precautions, confusion, alarms, ZURDO STEDY  Subjective   General  Chart Reviewed: Yes  Patient assessed for rehabilitation services?: Yes  Response to previous treatment: Patient with no complaints from previous session  Family / Caregiver Present: No  Subjective  Subjective: \"Are you my mother? \"  General Comment  Comments: Patient pleasant during OT treatment session      Orientation  Orientation  Overall Orientation Status: Impaired  Orientation Level: Oriented to person; Disoriented to place; Disoriented to time; Disoriented to situation  Objective    ADL  Feeding: Setup (Once tray set up in front of patient with all containers open, patient is able to feed herself)  Additional Comments: *Pt is DEP when up with 2 staff assist and use of zurdo stedy.         Balance  Sitting Balance: Maximum assistance (Initally Max A d/t posterior lean, after ~3 minutes, patient only required Min A)  Standing Balance: Dependent/Total (DEP x2 with use of zurdo stedy)  Standing Balance  Time: Standing [place] <10 seconds in zurdo stedy    Functional Mobility  Assist Level: Dependent/Total  Functional Mobility Comments: N/T and not safe or appropriate at this time; zurdo stedy used for transfers    Bed mobility  Supine to Sit: Dependent/Total; 2 Person assistance  Sit to Supine: Dependent/Total; 2 Person assistance  Scooting: Dependent/Total; 2 Person assistance  Comment: Patient with limited active participation d/t cognition. DEPx2  for all bed mobility    Transfers  Sit to stand: Dependent/Total; 2 Person assistance  Stand to sit: Dependent/Total; 2 Person assistance  Transfer Comments: DEP x2 with use of jessica stedy to position patient up higher in bed for optimal positioning and comfort. Patient was able to follow directions for hand placements with hand over hand assist.      Cognition  Overall Cognitive Status: Exceptions  Arousal/Alertness: Delayed responses to stimuli; Inconsistent responses to stimuli  Following Commands: Inconsistently follows commands; Follows one step commands with repetition; Follows one step commands with increased time; Does not follow commands  Attention Span: Difficulty attending to directions; Difficulty dividing attention; Attends with cues to redirect  Memory: Decreased short term memory; Decreased recall of recent events; Decreased long term memory; Decreased recall of precautions; Decreased recall of biographical Information  Safety Judgement: Decreased awareness of need for assistance; Decreased awareness of need for safety  Problem Solving: Assistance required to generate solutions; Assistance required to implement solutions; Assistance required to correct errors made; Assistance required to identify errors made; Decreased awareness of errors  Insights: Not aware of deficits  Initiation: Requires cues for all  Sequencing: Requires cues for all     Perception  Overall Perceptual Status: Impaired  Unilateral Attention: Cues to maintain midline in sitting  Initiation: Hand over hand to initiate tasks     Positioning  Bed Postion Comment: Positioned patient supine with HOB elevated at acceptable height for eating with pillow between BLE's to prevent hip adduction and internal rotation to maintain hip precautions.       Plan   Plan  Times per week: 4-5x/week 1x/day as neel  Times per day: Daily  Current Treatment Recommendations: Strengthening, Balance Training, Functional Mobility Training, Safety Education & Training, Positioning, Endurance Training, Neuromuscular Re-education, Patient/Caregiver Education & Training, Self-Care / ADL, Equipment Evaluation, Education, & procurement, Cognitive Reorientation, Pain Management, Home Management Training, Cognitive/Perceptual Training    AM-PAC Score        AM-PAC Inpatient Daily Activity Raw Score: 7 (11/27/21 1018)  AM-PAC Inpatient ADL T-Scale Score : 20.13 (11/27/21 1018)  ADL Inpatient CMS 0-100% Score: 92.44 (11/27/21 1018)  ADL Inpatient CMS G-Code Modifier : CM (11/27/21 1018)    Goals  Short term goals  Time Frame for Short term goals: by discharge, pt demo  Short term goal 1: bed mob tasks with use of rail as needed to max assist of 1. Short term goal 2: increase BUE strength by a 1/2 grade to assist with self care tasks. Short term goal 3: UB ADL to set up/min assist and LB ADL to max assist of 1 supine in bed and with use of rail as needed. Short term goal 4: postural control to EOB and min assist neel > 20 mins as needed to increase core strength/reduce falls with self care. Short term goal 5: ADL transfers with use of AD/lift as needed to max assist of 1 (add functional mob to POC as able). Long term goals  Long term goal 1: Pt to stand with mod assist of 1 neel > 4 mins as able to reduce falls in function. Long term goal 2: Pt to complete toileting tasks with use of BSC/AD and mod assist of 1. Long term goal 3: Caregivers to be I with pressure relief, BUE HEP, EC/WS and fall prevention tech as well as DME/AE and AD recommendations with use of handouts.   Patient Goals   Patient goals : pt unable to state due to cognitive deficits       Therapy Time   Individual Concurrent Group Co-treatment   Time In       0919   Time Out       0935   Minutes       16     Co-treatment with PT warranted secondary to decreased safety and independence requiring 2 skilled therapy professionals to address individual discipline's goals. OT addressing preparation for ADL transfer, sitting balance for increased ADL performance, sitting/activity tolerance, functional reaching, environmental safety/scanning, fall prevention, functional mobility for ADL transfers, ability to sequence and follow directions and functional UE strength. Upon writer exit, call light within reach, pt retired to bed. All lines intact and patient positioned comfortably. All patient needs addressed prior to ending therapy session. Chart reviewed prior to treatment and patient is agreeable for therapy. RN reports patient is medically stable for therapy treatment this date.       SANDY Noel/RACHEL

## 2021-11-27 NOTE — PLAN OF CARE
Problem: Skin Integrity:  Goal: Will show no infection signs and symptoms  Description: Will show no infection signs and symptoms  11/27/2021 0051 by Americo Pereira RN  Outcome: Ongoing     Problem: Skin Integrity:  Goal: Absence of new skin breakdown  Description: Absence of new skin breakdown  11/27/2021 0051 by Americo Pereira RN  Outcome: Ongoing     Problem: Falls - Risk of:  Goal: Will remain free from falls  Description: Will remain free from falls  11/27/2021 0051 by Americo Pereira RN  Outcome: Ongoing  Note: Patient is a fall risk during this admission. Fall risk assessment was performed. Patient is absent of falls. Bed is in the lowest position. Wheels on the bed are locked. Call light and bed side table are within reach. Clutter is removed. Patient was educated to call out when needing assistance or wanting to get out of bed. Patient offered toileting assistance during rounding. Hourly rounds have been performed. Problem: Falls - Risk of:  Goal: Absence of physical injury  Description: Absence of physical injury  11/27/2021 0051 by Americo Pereira RN  Outcome: Ongoing     Problem: SAFETY  Goal: Free from accidental physical injury  11/27/2021 0051 by Americo Pereira RN  Outcome: Ongoing     Problem: DAILY CARE  Goal: Daily care needs are met  11/27/2021 0051 by Americo Pereira RN  Outcome: Ongoing     Problem: PAIN  Goal: Patient's pain/discomfort is manageable  11/27/2021 0051 by Americo Pereira RN  Outcome: Ongoing       Problem: SKIN INTEGRITY  Goal: Skin integrity is maintained or improved  11/27/2021 0051 by Americo Pereira RN  Outcome: Ongoing     Problem: KNOWLEDGE DEFICIT  Goal: Patient/S.O. demonstrates understanding of disease process, treatment plan, medications, and discharge instructions.   11/27/2021 0051 by Americo Pereira RN  Outcome: Ongoing     Problem: DISCHARGE BARRIERS  Goal: Patient's continuum of care needs are met  11/27/2021 6172 by Philly Cat RN  Outcome: Ongoing     Problem: Confusion - Acute:  Goal: Absence of continued neurological deterioration signs and symptoms  Description: Absence of continued neurological deterioration signs and symptoms  11/27/2021 0051 by Philly Cat RN  Outcome: Ongoing     Problem: Confusion - Acute:  Goal: Mental status will be restored to baseline  Description: Mental status will be restored to baseline  11/27/2021 0051 by Philly Cat RN  Outcome: Ongoing     Problem: Discharge Planning:  Goal: Ability to perform activities of daily living will improve  Description: Ability to perform activities of daily living will improve  11/27/2021 0051 by Philly Cat RN  Outcome: Ongoing     Problem: Discharge Planning:  Goal: Participates in care planning  Description: Participates in care planning  11/27/2021 0051 by Philly Cat RN  Outcome: Ongoing     Problem: Injury - Risk of, Physical Injury:  Goal: Will remain free from falls  Description: Will remain free from falls  11/27/2021 0051 by Philly Cat RN  Outcome: Ongoing  Note: Patient is a fall risk during this admission. Fall risk assessment was performed. Patient is absent of falls. Bed is in the lowest position. Wheels on the bed are locked. Call light and bed side table are within reach. Clutter is removed. Patient was educated to call out when needing assistance or wanting to get out of bed. Patient offered toileting assistance during rounding. Hourly rounds have been performed.        Problem: Injury - Risk of, Physical Injury:  Goal: Absence of physical injury  Description: Absence of physical injury  11/27/2021 0051 by Philly Cat RN  Outcome: Ongoing     Problem: Mood - Altered:  Goal: Mood stable  Description: Mood stable  11/27/2021 0051 by Philly Cat RN  Outcome: Ongoing     Problem: Mood - Altered:  Goal: Absence of abusive behavior  Description: Absence of abusive behavior  11/27/2021 0051 by Armida Pedraza RN  Outcome: Ongoing     Problem: Mood - Altered:  Goal: Verbalizations of feeling emotionally comfortable while being cared for will increase  Description: Verbalizations of feeling emotionally comfortable while being cared for will increase  11/27/2021 0051 by Armida Pedraza RN  Outcome: Ongoing     Problem: Psychomotor Activity - Altered:  Goal: Absence of psychomotor disturbance signs and symptoms  Description: Absence of psychomotor disturbance signs and symptoms  11/27/2021 0051 by Armida Pedraza RN  Outcome: Ongoing     Problem: Sensory Perception - Impaired:  Goal: Demonstrations of improved sensory functioning will increase  Description: Demonstrations of improved sensory functioning will increase  11/27/2021 0051 by Armida Pedraza RN  Outcome: Ongoing     Problem: Sensory Perception - Impaired:  Goal: Decrease in sensory misperception frequency  Description: Decrease in sensory misperception frequency  11/27/2021 0051 by Armida Pedraza RN  Outcome: Ongoing     Problem: Sensory Perception - Impaired:  Goal: Able to refrain from responding to false sensory perceptions  Description: Able to refrain from responding to false sensory perceptions  11/27/2021 0051 by Armida Pedraza RN  Outcome: Ongoing     Problem: Sensory Perception - Impaired:  Goal: Demonstrates accurate environmental perceptions  Description: Demonstrates accurate environmental perceptions  11/27/2021 0051 by Arimda Pedraza RN  Outcome: Ongoing     Problem: Sensory Perception - Impaired:  Goal: Able to distinguish between reality-based and nonreality-based thinking  Description: Able to distinguish between reality-based and nonreality-based thinking  11/27/2021 0051 by Armida Pedraza RN  Outcome: Ongoing     Problem: Sensory Perception - Impaired:  Goal: Able to interrupt nonreality-based thinking  Description: Able to interrupt nonreality-based thinking  11/27/2021 0051 by Eleanor Woods RN  Outcome: Ongoing     Problem: Sleep Pattern Disturbance:  Goal: Appears well-rested  Description: Appears well-rested  11/27/2021 0051 by Eleanor Woods RN  Outcome: Ongoing     Problem: Pain:  Goal: Pain level will decrease  Description: Pain level will decrease  11/27/2021 0051 by Eleanor Woods RN  Outcome: Ongoing     Problem: Pain:  Goal: Control of acute pain  Description: Control of acute pain  11/27/2021 0051 by Eleanor Woods RN  Outcome: Ongoing     Problem: Pain:  Goal: Control of chronic pain  Description: Control of chronic pain  11/27/2021 0051 by Eleanor Woods RN  Outcome: Ongoing     Problem: Tissue Perfusion - Cardiopulmonary, Altered:  Goal: Absence of angina  Description: Absence of angina  11/27/2021 0051 by Eleanor Woods RN  Outcome: Ongoing     Problem: ABCDS Injury Assessment  Goal: Absence of physical injury  11/27/2021 0051 by Eleanor Woods RN  Outcome: Ongoing     Problem: Nutrition  Goal: Optimal nutrition therapy  11/27/2021 0051 by Eleanor Woods RN  Outcome: Ongoing

## 2021-11-27 NOTE — CARE COORDINATION
Social work: spoke to son x2 and he is in agreement with [facility] hospice coming tomorrow at 9:30 am here to meet with son and pt. They do have beds. Phone: [phone] and Fax [phone]. Will fax updates they have been following.   Del godoy

## 2021-11-27 NOTE — PROGRESS NOTES
Physical Therapy  Facility/Department: [facility]  Daily Treatment Note  NAME: Chi Lundberg  : 1935  MRN: [id number]    Date of Service: 2021    Discharge Recommendations:  Patient would benefit from continued therapy after discharge   Assessment   Body structures, Functions, Activity limitations: Decreased functional mobility ; Decreased cognition; Decreased posture; Decreased ADL status; Decreased endurance; Decreased ROM; Decreased strength; Increased pain; Decreased balance  Assessment:   Pt continues with limited active participation throughout d/t cognition deficits. Pt is a HIGH fall risk d/t decreased cognition, decreased strength, and decreased mobility. Pt currently requires skilled 2 person assist for safe functional mobility. Pt would benefit from continued skilled physical therapy to address these deficits in order to maximize independence w/ functional mobility. Prognosis: Good  Decision Making: High Complexity  Clinical Presentation: evolving  REQUIRES PT FOLLOW UP: Yes  Activity Tolerance  Activity Tolerance: Patient limited by cognitive status; Patient limited by pain     Patient Diagnosis(es): The encounter diagnosis was Closed fracture of neck of left femur, initial encounter ([facility] [address] has a past medical history of Dementia ([facility] [address] and Hypertension. has a past surgical history that includes Tubal ligation; Appendectomy; and hip surgery (Left, 2021). Restrictions  Restrictions/Precautions  Restrictions/Precautions: General Precautions, Fall Risk, Surgical Protocols, Up as Tolerated  Required Braces or Orthoses?: Yes  Position Activity Restriction  Hip Precautions: Posterior hip precautions, No hip flexion > 90 degrees, No ADduction, No hip internal rotation  Other position/activity restrictions: FWB, heels off bed at all times, ABD pillow, posterior hip precautions, confusion, alarms, ZURDO STEDY  Subjective   General  Chart Reviewed:  Yes  Additional Pertinent Hx: dementia, HTN  Family / Caregiver Present: No  Subjective  Subjective: Pt continues to have pain with movement of L LE and with tranfers  General Comment  Comments: RN approves PT session  Pain Screening  Patient Currently in Pain: Yes  Vital Signs  Patient Currently in Pain: Yes       Orientation  Orientation  Overall Orientation Status: Impaired  Orientation Level: Disoriented to place; Disoriented to time; Disoriented to situation  Objective   Bed mobility  Rolling: Dependent/Total  Supine to sit: Dependent/Total  Scooting: Dependent/Total  Transfers  Sit to Stand:  Maximum Assistance; 2 Person Assistance with jessica stedy  Stand to sit:  Maximum Assistance; 2 Person Assistance with jessica stedy  Ambulation  Ambulation?: No     Balance  Posture: Fair  Sitting - Static: Poor  Sitting - Dynamic: Poor  Standing - Static: Poor  Exercises  Sit to  jessica stedy with static standing <5seconds. Pt required max assistance of 2  Comments: Pt positioned in bed for comfort  with pillow for abduction to maintain proper hip alignment   Tray set up for breakfast with bed alarm on       AM-PAC Score     AM-PAC Inpatient Mobility without Stair Climbing Raw Score : 8 (11/27/21 1019)  AM-PAC Inpatient without Stair Climbing T-Scale Score : 30.65 (11/27/21 1019)  Mobility Inpatient CMS 0-100% Score: 80.91 (11/27/21 1019)  Mobility Inpatient without Stair CMS G-Code Modifier : CM (11/27/21 1019)       Goals  Short term goals  Time Frame for Short term goals: 12 visits  Short term goal 1:  Inc bed mobility to [employer] term goal 2: Pt able to transfer from bed to chair with min assist using safest device  Short term goal 3: Pt to tolerate sitting EOB up to 30 minutes with UB/LB support to improve core stability & repositioning for pressure relief & prevent sedentary complications  Short term goal 4: Pt able to tolerate 30-40 min of activity to include ex, NMR & functional to facilitate activity tolerance to [address] Plan  Times per week: 1-2x/D, 5-6D/week  Current Treatment Recommendations: Strengthening, ROM, Balance Training, Functional Mobility Training, Transfer Training, Endurance Training, ADL/Self-care Training, Gait Training, Safety Education & Training, Patient/Caregiver Education & Training, Equipment Evaluation, Education, & procurement  Safety Devices  Type of devices: Nurse notified, Left in bed, Gait belt, Call light within reach, Bed alarm in place, All fall risk precautions in place  Restraints  Initially in place: No     Therapy Time   Individual Concurrent Group Co-treatment   Time In        0919   Time Out        0935   Minutes        16      Co-treatment with OT warranted secondary to decreased safety and independence requiring 2 skilled therapy professionals to address individual discipline's goals. PT addressing  \"pre gait trunk strengthening\",\"weight shifting prior to transfers\",\"transfer training\",\"postural control in sitting\".   Micheal Streeter, PTA

## 2021-11-28 VITALS
BODY MASS INDEX: 17.52 KG/M2 | RESPIRATION RATE: 16 BRPM | WEIGHT: 109 LBS | HEIGHT: 66 IN | SYSTOLIC BLOOD PRESSURE: 149 MMHG | OXYGEN SATURATION: 95 % | HEART RATE: 90 BPM | TEMPERATURE: 97.5 F | DIASTOLIC BLOOD PRESSURE: 52 MMHG

## 2021-11-28 PROCEDURE — 6360000002 HC RX W HCPCS: Performed by: ORTHOPAEDIC SURGERY

## 2021-11-28 PROCEDURE — 6370000000 HC RX 637 (ALT 250 FOR IP): Performed by: ORTHOPAEDIC SURGERY

## 2021-11-28 PROCEDURE — 6370000000 HC RX 637 (ALT 250 FOR IP): Performed by: FAMILY MEDICINE

## 2021-11-28 RX ORDER — DONEPEZIL HYDROCHLORIDE 10 MG/1
10 TABLET, FILM COATED ORAL NIGHTLY
Qty: 30 TABLET | Refills: 3 | Status: SHIPPED | OUTPATIENT
Start: 2021-11-28

## 2021-11-28 RX ORDER — MORPHINE SULFATE 10 MG/5ML
2.5 SOLUTION ORAL EVERY 4 HOURS PRN
Status: DISCONTINUED | OUTPATIENT
Start: 2021-11-28 | End: 2021-11-28 | Stop reason: HOSPADM

## 2021-11-28 RX ORDER — AMLODIPINE BESYLATE 2.5 MG/1
2.5 TABLET ORAL DAILY
Qty: 30 TABLET | Refills: 3 | Status: SHIPPED | OUTPATIENT
Start: 2021-11-29

## 2021-11-28 RX ORDER — MORPHINE SULFATE 10 MG/5ML
2.5 SOLUTION ORAL EVERY 4 HOURS PRN
Qty: 10 ML | Refills: 0 | Status: SHIPPED | OUTPATIENT
Start: 2021-11-28 | End: 2021-12-01

## 2021-11-28 RX ORDER — MORPHINE SULFATE 10 MG/5ML
2.5 SOLUTION ORAL EVERY 4 HOURS PRN
Qty: 10 ML | Refills: 0
Start: 2021-11-28 | End: 2021-11-28

## 2021-11-28 RX ADMIN — ENOXAPARIN SODIUM 40 MG: 100 INJECTION SUBCUTANEOUS at 07:56

## 2021-11-28 RX ADMIN — HYDROCODONE BITARTRATE AND ACETAMINOPHEN 1 TABLET: 5; 325 TABLET ORAL at 09:41

## 2021-11-28 RX ADMIN — CITALOPRAM HYDROBROMIDE 10 MG: 10 TABLET ORAL at 07:56

## 2021-11-28 RX ADMIN — MORPHINE SULFATE 2.6 MG: 10 SOLUTION ORAL at 15:48

## 2021-11-28 RX ADMIN — AMLODIPINE BESYLATE 2.5 MG: 2.5 TABLET ORAL at 07:56

## 2021-11-28 RX ADMIN — ACETAMINOPHEN 650 MG: 325 TABLET ORAL at 07:56

## 2021-11-28 ASSESSMENT — PAIN SCALES - GENERAL
PAINLEVEL_OUTOF10: 5
PAINLEVEL_OUTOF10: 8
PAINLEVEL_OUTOF10: 8
PAINLEVEL_OUTOF10: 5

## 2021-11-28 ASSESSMENT — PAIN DESCRIPTION - PAIN TYPE
TYPE: ACUTE PAIN
TYPE: SURGICAL PAIN

## 2021-11-28 ASSESSMENT — PAIN DESCRIPTION - LOCATION: LOCATION: HIP

## 2021-11-28 ASSESSMENT — PAIN DESCRIPTION - ORIENTATION: ORIENTATION: LEFT

## 2021-11-28 NOTE — PROGRESS NOTES
Pt discharged to [facility] with [facility] hospice. Attempted to call report with no answer. Medicated for pain as ordered.

## 2021-11-28 NOTE — PLAN OF CARE
"  Thank you for choosing [facility] for your obstetrical care.  We are an integrated health clinic with obstetricians, midwives, a psychologist, an acupuncturist, a nutritionist, a pharmacist, internal medicine and family practice all in one.  If you have questions about services offered please ask.      o Please keep all appointments with your provider.  You will help catch, prevent and treat problems early.  o Review your Expectant Family booklet and folder given to you at this intake visit.  It can answer many basic questions including:    Treatment for nausea and vomiting    Medications that are safe in pregnancy    Healthy diet and weight gain    Exercise and activity  o ASK questions!!  Please use \"Questions for my New OB visit\" form to write down any questions or concerns for your next visit.  o Eat a healthy diet.  Visit www.choosemyplate.gov and click on  Pregnancy and Breastfeeding  for information and tips  o Do not smoke.  Avoid other people's smoke, too.  We are happy to help with referrals to stop smoking programs.  o Do not drink alcohol.  o Try to avoid people who have colds or other infections.  Practice good hand washing.  o Consider registering for our Healthy Pregnancy Class here at [facility].  This class is offered every 3rd Wednesday from 2:30-4:30 p.m.  [place] at [phone] or online at [email or web address] or [email or web address]  o Consider registering for prenatal education classes through [place] at [facility].  You can view class schedules and register online at [email or web address] or call [phone] for questions     For urgent concerns, call [facility] at [phone] to speak with a triage nurse during regular clinic hours (8:00 am - 4:30 pm).  This line is answered by our service 24 hours a day, after hours a provider will return your call.  Thank you for choosing [facility] for your " Problem: Skin Integrity:  Goal: Will show no infection signs and symptoms  Description: Will show no infection signs and symptoms  11/28/2021 0846 by Lennie Bailey RN  Outcome: Ongoing  11/28/2021 0212 by Stevan Henriquez RN  Outcome: Ongoing  Goal: Absence of new skin breakdown  Description: Absence of new skin breakdown  11/28/2021 0846 by Lennie Bailey RN  Outcome: Ongoing  11/28/2021 0212 by Stevan Henriquez RN  Outcome: Ongoing     Problem: Falls - Risk of:  Goal: Will remain free from falls  Description: Will remain free from falls  11/28/2021 0846 by Lennie Bailey RN  Outcome: Ongoing  11/28/2021 0212 by Stevan Henriquez RN  Outcome: Ongoing  Note: Siderails up x 2  Hourly rounding  Call light in reach  Instructed to call for assist before attempting out of bed. Remains free from falls and accidental injury at this time   Floor free from obstacles  Bed is locked and in lowest position  Adequate lighting provided  Bed alarm on, Red Falling star and Stay with Me signs posted    Goal: Absence of physical injury  Description: Absence of physical injury  11/28/2021 0846 by Lennie Bailey RN  Outcome: Ongoing  11/28/2021 0212 by Stevan Henriquez RN  Outcome: Ongoing     Problem: SAFETY  Goal: Free from accidental physical injury  11/28/2021 0846 by Lennie Bailey RN  Outcome: Ongoing  11/28/2021 0212 by Stevan Henriquez RN  Outcome: Ongoing     Problem: DAILY CARE  Goal: Daily care needs are met  11/28/2021 0846 by Lennie Bailey RN  Outcome: Ongoing  11/28/2021 0212 by Stevan Henriquez RN  Outcome: Ongoing     Problem: PAIN  Goal: Patient's pain/discomfort is manageable  11/28/2021 0846 by Lennie Bailey RN  Outcome: Ongoing  11/28/2021 0212 by Stevan Henriquez RN  Outcome: Ongoing     Problem: SKIN INTEGRITY  Goal: Skin integrity is maintained or improved  11/28/2021 0846 by Lennie Bailey RN  Outcome: Ongoing  11/28/2021 0212 by Stevan Henriquez RN  Outcome: Ongoing     Problem: KNOWLEDGE DEFICIT  Goal: Patient/S. O. "obstetrical care.  We are an integrated health clinic with obstetricians, midwives, a psychologist, an acupuncturist, a nutritionist, a pharmacist, internal medicine and family practice all in one.  If you have questions about services offered please ask.      o Please keep all appointments with your provider.  You will help catch, prevent and treat problems early.  o Review your Expectant Family booklet and folder given to you at this intake visit.  It can answer many basic questions including:    Treatment for nausea and vomiting    Medications that are safe in pregnancy    Healthy diet and weight gain    Exercise and activity  o ASK questions!!  Please use \"Questions for my New OB visit\" form to write down any questions or concerns for your next visit.  o Eat a healthy diet.  Visit www.choosemyplate.gov and click on  Pregnancy and Breastfeeding  for information and tips  o Do not smoke.  Avoid other people's smoke, too.  We are happy to help with referrals to stop smoking programs.  o Do not drink alcohol.  o Try to avoid people who have colds or other infections.  Practice good hand washing.  o Consider registering for our Healthy Pregnancy Class here at [facility].  This class is offered every 3rd Wednesday from 2:30-4:30 p.m.  [place] at [phone] or online at [email or web address] or [email or web address]  o Consider registering for prenatal education classes through [place] at [facility].  You can view class schedules and register online at [email or web address] or call [phone] for questions     For urgent concerns, call [facility] at [phone] to speak with a triage nurse during regular clinic hours (8:00 am - 4:30 pm).  This line is answered by our service 24 hours a day, after hours a provider will return your call.  " demonstrates understanding of disease process, treatment plan, medications, and discharge instructions. 11/28/2021 0846 by Nathalia Warren RN  Outcome: Ongoing  11/28/2021 0212 by Susanna Woodward RN  Outcome: Ongoing     Problem: DISCHARGE BARRIERS  Goal: Patient's continuum of care needs are met  11/28/2021 0846 by Nathalia Warren RN  Outcome: Ongoing  11/28/2021 0212 by uSsanna Woodward RN  Outcome: Ongoing     Problem: Confusion - Acute:  Goal: Absence of continued neurological deterioration signs and symptoms  Description: Absence of continued neurological deterioration signs and symptoms  11/28/2021 0846 by Nathalia Warren RN  Outcome: Ongoing  11/28/2021 0212 by Susanna Woodward RN  Outcome: Ongoing  Goal: Mental status will be restored to baseline  Description: Mental status will be restored to baseline  11/28/2021 0846 by Nathalia Warren RN  Outcome: Ongoing  11/28/2021 0212 by Susanna Woodward RN  Outcome: Ongoing     Problem: Discharge Planning:  Goal: Ability to perform activities of daily living will improve  Description: Ability to perform activities of daily living will improve  11/28/2021 0846 by Nathalia Warren RN  Outcome: Ongoing  11/28/2021 0212 by Susanna Woodward RN  Outcome: Ongoing  Goal: Participates in care planning  Description: Participates in care planning  11/28/2021 0846 by Nathalia Warren RN  Outcome: Ongoing  11/28/2021 0212 by Susanna Woodward RN  Outcome: Ongoing     Problem: Injury - Risk of, Physical Injury:  Goal: Will remain free from falls  Description: Will remain free from falls  11/28/2021 0846 by Nathalia Warren RN  Outcome: Ongoing  11/28/2021 0212 by Susanna Woodward RN  Outcome: Ongoing  Note: Siderails up x 2  Hourly rounding  Call light in reach  Instructed to call for assist before attempting out of bed.   Remains free from falls and accidental injury at this time   Floor free from obstacles  Bed is locked and in lowest position  Adequate lighting provided  Bed alarm on, Red Falling star and Stay with Me signs posted    Goal: Absence of physical injury  Description: Absence of physical injury  11/28/2021 0846 by Enrique Daniel RN  Outcome: Ongoing  11/28/2021 0212 by Brady Hernandez RN  Outcome: Ongoing     Problem: Mood - Altered:  Goal: Mood stable  Description: Mood stable  11/28/2021 0846 by Enrique Daniel RN  Outcome: Ongoing  11/28/2021 0212 by Brady Hernandez RN  Outcome: Ongoing  Goal: Absence of abusive behavior  Description: Absence of abusive behavior  11/28/2021 0846 by Enrique Daniel RN  Outcome: Ongoing  11/28/2021 0212 by Brady Hernandez RN  Outcome: Ongoing  Goal: Verbalizations of feeling emotionally comfortable while being cared for will increase  Description: Verbalizations of feeling emotionally comfortable while being cared for will increase  11/28/2021 0846 by Enrique Daniel RN  Outcome: Ongoing  11/28/2021 0212 by Brady Hernandez RN  Outcome: Ongoing     Problem: Psychomotor Activity - Altered:  Goal: Absence of psychomotor disturbance signs and symptoms  Description: Absence of psychomotor disturbance signs and symptoms  11/28/2021 0846 by Enrique Danile RN  Outcome: Ongoing  11/28/2021 0212 by Brady Hernandez RN  Outcome: Ongoing     Problem: Sensory Perception - Impaired:  Goal: Demonstrations of improved sensory functioning will increase  Description: Demonstrations of improved sensory functioning will increase  11/28/2021 0846 by Enrique Daniel RN  Outcome: Ongoing  11/28/2021 0212 by Brady Hernandez RN  Outcome: Ongoing  Goal: Decrease in sensory misperception frequency  Description: Decrease in sensory misperception frequency  11/28/2021 0846 by Enrique Daniel RN  Outcome: Ongoing  11/28/2021 0212 by Brady Hernandez RN  Outcome: Ongoing  Goal: Able to refrain from responding to false sensory perceptions  Description: Able to refrain from responding to false sensory perceptions  11/28/2021 0846 by Enrique Daniel RN  Outcome: Ongoing  11/28/2021 0212 by Brady Hernandez RN  Outcome: Ongoing     Problem: ABCDS Injury Assessment  Goal: Absence of physical injury  11/28/2021 0846 by Sky Robertson RN  Outcome: Ongoing  11/28/2021 0212 by Babs Cameron RN  Outcome: Ongoing     Problem: Nutrition  Goal: Optimal nutrition therapy  11/28/2021 0846 by Sky Robertson RN  Outcome: Ongoing  11/28/2021 0212 by Babs Cameron RN  Outcome: Ongoing   Patient is a fall risk during this admission. Fall risk assessment was performed. Patient is absent of falls. Bed is in the lowest position. Wheels on the bed are locked. Call light and bed side table are within reach. Clutter is removed. Patient was educated to call out when needing assistance or wanting to get out of bed. Patient offered toileting assistance during rounding. Hourly rounds have been performed. Pt medicated with pain medication prn. Assessed all pain characteristics including level, type, location, frequency, and onset. Non-pharmacologic interventions offered to pt as well. Pt states pain is tolerable at this time.  Will continue to monitor

## 2021-11-28 NOTE — PLAN OF CARE
Problem: Skin Integrity:  Goal: Will show no infection signs and symptoms  Description: Will show no infection signs and symptoms  11/28/2021 0212 by Jie Contreras RN  Outcome: Ongoing     Problem: Skin Integrity:  Goal: Absence of new skin breakdown  Description: Absence of new skin breakdown  11/28/2021 0212 by Jie Contreras RN  Outcome: Ongoing     Problem: Falls - Risk of:  Goal: Will remain free from falls  Description: Will remain free from falls  11/28/2021 0212 by Jie Contreras RN  Outcome: Ongoing  Note: Siderails up x 2  Hourly rounding  Call light in reach  Instructed to call for assist before attempting out of bed. Remains free from falls and accidental injury at this time   Floor free from obstacles  Bed is locked and in lowest position  Adequate lighting provided  Bed alarm on, [place] star and Stay with Me signs posted       Problem: SAFETY  Goal: Free from accidental physical injury  11/28/2021 0212 by Jie Contreras RN  Outcome: Ongoing     Problem: Confusion - Acute:  Goal: Absence of continued neurological deterioration signs and symptoms  Description: Absence of continued neurological deterioration signs and symptoms  11/28/2021 0212 by Jie Contreras RN  Outcome: Ongoing     Problem: Confusion - Acute:  Goal: Mental status will be restored to baseline  Description: Mental status will be restored to baseline  11/28/2021 0212 by Jie Contreras RN  Outcome: Ongoing     Problem: Pain:  Goal: Pain level will decrease  Description: Pain level will decrease  11/28/2021 0212 by Jie Contreras RN  Outcome: Ongoing  Note: Pain level assessment complete.    Patient educated on pain scale and control interventions  PRN pain medication given per patient request  Patient instructed to call out with new onset of pain or unrelieved pain       Problem: Pain:  Goal: Control of acute pain  Description: Control of acute pain  11/28/2021 0212 by Jie Contreras RN  Outcome: Ongoing

## 2021-11-28 NOTE — PROGRESS NOTES
Progress note  [facility].,    Adult Hospitalist      Name: Hellen Kaba  MRN: [id number]     [place]: [de-identified]  Room: 2001/2001-02    Admit Date: 11/19/2021  1:00 AM  PCP: Arnoldo Turk MD    Primary Problem  Active Problems:    Left displaced femoral neck fracture ([address]  Resolved Problems:    * No resolved hospital problems. *        Assesment:       · Left displaced femoral neck fracture  · Essential hypertension  · Major depressive disorder  · Advanced Dementia  · Vitamin D deficiency  · Agitation  · Reduced urine output- improved  · Anemia unspecified            Plan:     · Admit to MedSur telemetry  · O2 maintain oxygen saturation greater than 92%. · Pain control  · Orthopedic consult, Open treatment of Left displaced femoral neck fracture with prosthetic replacement on 11/21/21  · Got clearance from cardiology  · PT OT  · Continue to monitor/telemetry/CBC with differential daily/BMP daily  · Ativan--avoid, also avoid narcotics  · Seroquel per neurology  · DVT and GI prophylaxis. · SNF bed request will be canceled  · Consult Hospice  · D/w son   · All questions answered   · Encourage p.o.   · Change labs to every other day in hospital  · D/w RN  · D/w SW  · Family conference/daughter-in-law at bedside -discussed patient condition, quality of life, therapeutic and other options for the future  · Son is medical POA who wishes institutional hospice-SNF  · Bed available at SNF  · DC [place]  · Morphine as needed for pain control  Spent more than 35 minutes  Continue medications as below      Scheduled Meds:   amLODIPine  2.5 mg Oral Daily    donepezil  10 mg Oral Nightly    QUEtiapine  50 mg Oral Nightly    citalopram  10 mg Oral Daily    sodium chloride flush  10 mL IntraVENous 2 times per day    enoxaparin  40 mg SubCUTAneous Daily     Continuous Infusions:   dextrose 5 % and 0.45 % NaCl Stopped (11/25/21 1023)    sodium chloride 125 mL/hr at 11/21/21 1254     PRN Meds:  morphine, 2.6 mg, Q4H PRN  hydrALAZINE, 10 mg, Q6H PRN  HYDROcodone 5 mg - acetaminophen, 1 tablet, Q6H PRN  HYDROcodone 5 mg - acetaminophen, 2 tablet, Q6H PRN  sodium chloride flush, 10 mL, PRN  sodium chloride, 25 mL, PRN  ondansetron, 4 mg, Q8H PRN   Or  ondansetron, 4 mg, Q6H PRN  magnesium hydroxide, 30 mL, Daily PRN  acetaminophen, 650 mg, Q6H PRN   Or  acetaminophen, 650 mg, Q6H PRN  potassium chloride, 40 mEq, PRN   Or  potassium alternative oral replacement, 40 mEq, PRN   Or  potassium chloride, 10 mEq, PRN        Chief Complaint:     Chief Complaint   Patient presents with    Fall     left thigh pain         History of Present Illness:      Patient seen and examined at bedside. Patient is still confused and unable to provide meaningful history  Comfortable   However patient is pleasant  Patient is most alert today, family feels the same  She is more pleasant than usual  Pain is better controlled  Responds to some commands appropriately  Sitter/ family  at bedside  Extensive discussion with family, RN and social work        HPI:    Favian Bell is a 81 y.o.  female who presents with Fall (left thigh pain)      71-year-old lady with past medical history of hypertension presented to ER after an unwitnessed fall at nursing home. Patient is complaining of left leg pain and complaining of severe pain. Patient historian    Hemoglobin was 10.8. BMP was unremarkable. Blood pressure was 157/86. CT head and cervical spine was negative. X-ray hip shows left femoral neck fracture extending into the medial aspect of the greater trochanter with moderate varus angulation and some degree of overriding. I have personally reviewed the past medical history, past surgical history, medications, social history, and family history, and summarized in the note.     Review of Systems:     Limited review of systems      Past Medical History:     Past Medical History:   Diagnosis Date    Dementia ([address]     Hypertension         Past Surgical History:     Past Surgical History:   Procedure Laterality Date    APPENDECTOMY      HIP SURGERY Left 11/21/2021    HIP HEMIARTHROPLASTY performed by Jairo Asencio MD at [place] [address]          Medications Prior to Admission:       Prior to Admission medications    Medication Sig Start Date End Date Taking? Authorizing Provider   gabapentin (NEURONTIN) 100 MG capsule Take 100 mg by mouth 3 times daily. Yes Historical Provider, MD   nitrofurantoin, macrocrystal-monohydrate, (MACROBID) 100 MG capsule Take 100 mg by mouth nightly   Yes Historical Provider, MD   polyethylene glycol (GLYCOLAX) 17 g packet Take 17 g by mouth daily   Yes Historical Provider, MD   simvastatin (ZOCOR) 40 MG tablet Take 40 mg by mouth nightly   Yes Historical Provider, MD   timolol (TIMOPTIC) 0.5 % ophthalmic solution Place 1 drop into both eyes 2 times daily   Yes Historical Provider, MD   traMADol (ULTRAM) 50 MG tablet Take 50 mg by mouth every 8 hours as needed for Pain. Yes Historical Provider, MD   enoxaparin (LOVENOX) 40 MG/0.4ML injection Inject 0.4 mLs into the skin daily for 42 doses 11/21/21 1/2/22 Yes Jairo Asencio MD   HYDROcodone-acetaminophen (NORCO) 5-325 MG per tablet Take 1 tablet by mouth every 6 hours as needed for Pain for up to 7 days. Do not exceed 3000 mg of Acetaminophen in 24 hrs. 11/21/21 11/28/21 Yes Jairo Asencio MD   citalopram (CELEXA) 10 MG tablet Take 10 mg by mouth daily   Yes Historical Provider, MD        Allergies:       Patient has no known allergies. Social History:     Tobacco:    reports that she has never smoked. She has never used smokeless tobacco.  Alcohol:      reports current alcohol use of about 1.0 standard drink of alcohol per week. Drug Use:  reports no history of drug use.     Family History:     Family History   Problem Relation Age of Onset    No Known Problems Mother     Heart Disease Father          Physical Exam: Vitals:  BP (!) 149/52   Pulse 90   Temp 97.5 °F (36.4 °C) (Axillary)   Resp 16   Ht 5' 6\" (1.676 m)   Wt 109 lb (49.4 kg)   SpO2 95%   BMI 17.59 kg/m²   Temp (24hrs), Av.9 °F (36.6 °C), Min:97.3 °F (36.3 °C), Max:98.5 °F (36.9 °C)          General appearance -sleeping  Mental status -confused  Head - normocephalic and atraumatic  Eyes - pupils equal and reactive, extraocular eye movements intact, conjunctiva clear  Ears - hearing appears to be intact  Nose - no drainage noted  Mouth - mucous membranes moist  Neck - supple, no carotid bruits, thyroid not palpable  Chest - clear to auscultation, normal effort  Heart - normal rate, regular rhythm, no murmur  Abdomen - soft, nontender, nondistended, bowel sounds present all four quadrants, no masses, hepatomegaly or splenomegaly  Neurological -unable to assess  Extremities - peripheral pulses palpable, no pedal edema or calf pain with palpation  Skin - no gross lesions, rashes, or induration noted        Data:     Labs:    Hematology:  Recent Labs     21  0704 21  0644   WBC 8.3 9.1   RBC 3.03* 2.96*   HGB 9.3* 9.3*   HCT 30.7* 29.4*   .3 99.3   MCH 30.7 31.4   MCHC 30.3 31.6   RDW 14.0 13.6    336   MPV 9.0 8.5     Chemistry:  Recent Labs     21  0644      K 4.0      CO2 26   GLUCOSE 98   BUN 15   CREATININE 0.61   ANIONGAP 11   LABGLOM >60   GFRAA >60   CALCIUM 8.2*     No results for input(s): PROT, LABALBU, LABA1C, Z4SXMKM, R1WCRSN, FT4, TSH, AST, ALT, LDH, GGT, ALKPHOS, LABGGT, BILITOT, BILIDIR, AMMONIA, AMYLASE, LIPASE, LACTATE, CHOL, HDL, LDLCHOLESTEROL, CHOLHDLRATIO, TRIG, VLDL, OHE15QQ, PHENYTOIN, PHENYF, URICACID, POCGLU in the last 72 hours.     Lab Results   Component Value Date    INR 1.2 2021    PROTIME 14.8 (H) 2021       Lab Results   Component Value Date/Time    SPECIAL NOT REPORTED 2021 05:10 PM     Lab Results   Component Value Date/Time    CULTURE NO GROWTH 2021 05:10 PM       No results found for: POCPH, PHART, PH, POCPCO2, MQV9BRC, PCO2, POCPO2, PO2ART, PO2, POCHCO3, TXU7EGY, HCO3, NBEA, PBEA, BEART, BE, THGBART, THB, XEL2XKY, XQDT4ABN, Z4XYWCVH, O2SAT, FIO2    Radiology:    XR FEMUR LEFT (MIN 2 VIEWS)    Result Date: 11/19/2021  Left femoral neck fracture extending into the medial aspect of the greater trochanter with moderate varus angulation and some degree of overriding. Decreased bone density. Moderate atherosclerotic change noted. CT Head WO Contrast    Result Date: 11/19/2021  No acute intracranial abnormality. CT Cervical Spine WO Contrast    Result Date: 11/19/2021  No acute abnormality of the cervical spine. Cervical spine degenerative changes as detailed above. Possibly significant foraminal stenosis on the right at C3-4 and C5-6. CT HIP LEFT WO CONTRAST    Result Date: 11/19/2021  Left femoral neck fracture as described. Osteoporosis. XR HIP 2-3 VW W PELVIS LEFT    Result Date: 11/19/2021  Left femoral neck fracture extending into the medial aspect of the greater trochanter with moderate varus angulation and some degree of overriding. Decreased bone density. Moderate atherosclerotic change noted. All radiological studies reviewed                Code Status:  Full Code    Electronically signed by Rebecca Harrison MD on 11/28/2021 at 2:22 PM     Copy sent to Dr. Diana Trejo MD    This note was created with the assistance of a speech-recognition program.  Although the intention is to generate a document that actually reflects the content of the visit, no guarantees can be provided that every mistake has been identified and corrected by editing. Note was updated later by me after  physical examination and  completion of the assessment.

## 2021-11-28 NOTE — CARE COORDINATION
[address] met with son this morning and patient is appropriate for hospice care. She does not meet GIP status. Spoke with son. He would like patient transferred to [facility] with [facility]. [facility] will admit today. [facility] will transport at 430. HENS completed. Orders faxed. [facility] informed of dc time. Nurse to call report to [phone].  Karol Marin

## 2021-11-29 NOTE — DISCHARGE SUMMARY
[facility],    Adult Hospitalist      Patient ID: Tank Jiang  MRN: [id number]     Acct:  [de-identified]       Patient's PCP: Cathy Cano MD    Admit Date: 11/19/2021     Discharge Date: 11/28/2021      Admitting Physician: Teresa Jiang MD    Discharge Physician: Nya Carrillo MD     CONSULTANTS: Patient Care Team:  Cathy Cano MD as PCP - General (Family Medicine)    PROCEDURES PERFORMED:     Active Discharge Diagnoses:  · Left displaced femoral neck fracture  · Essential hypertension  · Major depressive disorder  · Advanced Dementia  · Vitamin D deficiency  · Agitation  · Reduced urine output- improved  · Anemia unspecified         Primary Problem  <principal problem not specified>    Hospital Course: Tank Jaing is a 81 y.o.  female who presents with Fall (left thigh pain)        26-year-old lady with past medical history of hypertension presented to ER after an unwitnessed fall at nursing home. Patient is complaining of left leg pain and complaining of severe pain. Patient historian     Hemoglobin was 10.8. BMP was unremarkable. Blood pressure was 157/86. CT head and cervical spine was negative. X-ray hip shows left femoral neck fracture extending into the medial aspect of the greater trochanter with moderate varus angulation and some degree of overriding.     Patient had difficulty in understanding regarding mobility. She would also not let the wedge pillow be placed in between her legs. Patient was very uncomfortable initially and pain medication were adjusted. Extensive discussion was done with family and medical power of . Patient would have very poor quality of life because of her advanced dementia. Hospice was consulted and patient was maintained on a higher dose of morphine after which the patient was far more comfortable. Her anxiety was better also.     I have personally reviewed the past medical history, past surgical history, medications, social history, and family history, and summarized in the note. The plan was discussed in detail with patient who agreed with the plan and verbalized understanding . The patient was seen and examined on day of discharge and this discharge summary is in conjunction with any daily progress note from day of discharge. Hospital Data:    Labs:    Hematology:  Recent Labs     21  0704 21  0644   WBC 8.3 9.1   RBC 3.03* 2.96*   HGB 9.3* 9.3*   HCT 30.7* 29.4*   .3 99.3   MCH 30.7 31.4   MCHC 30.3 31.6   RDW 14.0 13.6    336   MPV 9.0 8.5     Chemistry:  Recent Labs     21  0644      K 4.0      CO2 26   GLUCOSE 98   BUN 15   CREATININE 0.61   ANIONGAP 11   LABGLOM >60   GFRAA >60   CALCIUM 8.2*     No results for input(s): PROT, LABALBU, LABA1C, T9AALEP, T4QNMIL, FT4, TSH, AST, ALT, LDH, GGT, ALKPHOS, LABGGT, BILITOT, BILIDIR, AMMONIA, AMYLASE, LIPASE, LACTATE, CHOL, HDL, LDLCHOLESTEROL, CHOLHDLRATIO, TRIG, VLDL, TTL70OG, PHENYTOIN, PHENYF, URICACID, POCGLU in the last 72 hours. Lab Results   Component Value Date    INR 1.2 2021    PROTIME 14.8 (H) 2021     Lab Results   Component Value Date/Time    SPECIAL NOT REPORTED 2021 05:10 PM     Lab Results   Component Value Date/Time    CULTURE NO GROWTH 2021 05:10 PM       No results found for: POCPH, PHART, PH, POCPCO2, IRH1CRZ, PCO2, POCPO2, PO2ART, PO2, POCHCO3, VHU0EUC, HCO3, NBEA, PBEA, BEART, BE, THGBART, THB, MOV7SRB, MDME5QNU, F3NQTEPH, O2SAT, FIO2    Radiology:    No results found.       All radiological studies reviewed      Reviews of Symptoms:    A 10 point system is reviewed and  negative except described in hospital course    Physical Exam:    Vitals:  BP (!) 149/52   Pulse 90   Temp 97.5 °F (36.4 °C) (Axillary)   Resp 16   Ht 5' 6\" (1.676 m)   Wt 109 lb (49.4 kg)   SpO2 95%   BMI 17.59 kg/m²   Temp (24hrs), Av.9 °F (36.6 °C), Min:97.3 °F (36.3 °C), Max:98.5 °F (36.9 °C)      General appearance - alert, well appearing, and in no acute distress  Mental status - oriented to person, place, and time with normal affect  Head - normocephalic and atraumatic  Eyes - pupils equal and reactive, extraocular eye movements intact, conjunctiva clear  Ears - hearing appears to be intact  Nose - no drainage noted  Mouth - mucous membranes moist  Neck - supple, no carotid bruits, thyroid not palpable  Chest - clear to auscultation, normal effort  Heart - normal rate, regular rhythm, no murmur  Abdomen - soft, nontender, nondistended, bowel sounds present all four quadrants, no masses, hepatomegaly or splenomegaly  Neurological - normal speech, no focal findings or movement disorder noted, cranial nerves II through XII grossly intact  Extremities - peripheral pulses palpable, no pedal edema or calf pain with palpation  Skin - no gross lesions, rashes, or induration noted      Consults:  IP CONSULT TO ORTHOPEDIC SURGERY  IP CONSULT TO SOCIAL WORK  IP CONSULT TO INTERNAL MEDICINE  IP CONSULT TO CARDIOLOGY  IP CONSULT TO SOCIAL WORK  IP CONSULT TO NEUROLOGY    Disposition: Skilled nursing facility    Discharged Condition: Stable    Follow Up: Joshua Soto MD  [address] [place] [id number]  [phone]    Schedule an appointment as soon as possible for a visit in 1 week  For follow up appointment            Diet: No diet orders on file    Discharge Medications:   @DISCHARGEMEDSLIST(<NOROUTINE> error)@    Code Status:  Prior    Time Spent on discharge is  35 mins in patient examination, evaluation, counseling as well as medication reconciliation, prescriptions for required medications, discharge plan and follow up. Electronically signed by Brian Fountain MD on 11/28/2021 at 7:41 PM     Thank you Dr. Liliane Deleon MD for the opportunity to be involved in this patient's care.     This note was created with the assistance of a speech-recognition program.  Although the intention is to generate a document that actually reflects the content of the visit, no guarantees can be provided that every mistake has been identified and corrected by editing. Note was updated later by me after  physical examination and  completion of the assessment.

## 2021-12-06 DIAGNOSIS — M25.552 LEFT HIP PAIN: Primary | ICD-10-CM

## 2023-01-31 NOTE — PLAN OF CARE
Problem: Skin Integrity:  Goal: Will show no infection signs and symptoms  Description: Will show no infection signs and symptoms  11/27/2021 1322 by Michael Jain RN  Outcome: Ongoing     Problem: Falls - Risk of:  Goal: Will remain free from falls  Description: Will remain free from falls  11/27/2021 1322 by Michael Jain RN  Outcome: Ongoing     Problem: SAFETY  Goal: Free from accidental physical injury  11/27/2021 1322 by Michael Jain RN  Outcome: Ongoing     Problem: DAILY CARE  Goal: Daily care needs are met  11/27/2021 1322 by Michael Jain RN  Outcome: Ongoing     Problem: PAIN  Goal: Patient's pain/discomfort is manageable  11/27/2021 1322 by Michael Jain RN  Outcome: Ongoing     Problem: DISCHARGE BARRIERS  Goal: Patient's continuum of care needs are met  11/27/2021 1322 by Michael Jain RN  Outcome: Ongoing     Problem: ABCDS Injury Assessment  Goal: Absence of physical injury  11/27/2021 1322 by Michael Jain RN  Outcome: Ongoing Cyclophosphamide Counseling:  I discussed with the patient the risks of cyclophosphamide including but not limited to hair loss, hormonal abnormalities, decreased fertility, abdominal pain, diarrhea, nausea and vomiting, bone marrow suppression and infection. The patient understands that monitoring is required while taking this medication.

## (undated) DEVICE — SOLUTION IV 250ML 0.9% SOD CHL PH 5 INJ USP VIAFLX PLAS

## (undated) DEVICE — ADHESIVE SKIN CLSR 0.7ML TOP DERMBND ADV

## (undated) DEVICE — INTENDED FOR TISSUE SEPARATION, AND OTHER PROCEDURES THAT REQUIRE A SHARP SURGICAL BLADE TO PUNCTURE OR CUT.: Brand: BARD-PARKER ® CARBON RIB-BACK BLADES

## (undated) DEVICE — SMARTGOWN SURGICAL GOWN, 3XL, LONG: Brand: CONVERTORS

## (undated) DEVICE — SST TWIST DRILL, AO TYPE, 2.5MM DIA. X 85MM: Brand: MICROAIRE®

## (undated) DEVICE — PADDING,UNDERCAST,COTTON, 4"X4YD STERILE: Brand: MEDLINE

## (undated) DEVICE — Device

## (undated) DEVICE — SUTURE VCRL SZ 2-0 L36IN ABSRB UD L36MM CT-1 1/2 CIR J945H

## (undated) DEVICE — 3M™ STERI-DRAPE™ U-DRAPE 1015: Brand: STERI-DRAPE™

## (undated) DEVICE — GLOVE SURG SZ 85 L12IN FNGR THK79MIL GRN LTX FREE

## (undated) DEVICE — DRESSING PETRO W3XL8IN OIL EMUL N ADH GZ KNIT IMPREG CELOS

## (undated) DEVICE — DRAPE,U/ SHT,SPLIT,PLAS,STERIL: Brand: MEDLINE

## (undated) DEVICE — FEMORAL CANAL BRUSH, IRRIGATION/SUCTION

## (undated) DEVICE — GLOVE SURG SZ 8 CRM LTX FREE POLYISOPRENE POLYMER BEAD ANTI

## (undated) DEVICE — BLADE SAGITAL 18X90X1.27MM

## (undated) DEVICE — COVER,MAYO STAND,XL,STERILE: Brand: MEDLINE

## (undated) DEVICE — SOLUTION PREP POVIDONE IOD FOR SKIN MUCOUS MEM PRIOR TO

## (undated) DEVICE — Z DISCONTINUED PER MEDLINE USE 2741943 DRESSING AQUACEL 10 IN ALG W9XL25CM SIL CVR WTRPRF VIR BACT BARR ANTIMIC

## (undated) DEVICE — SUTURE ABSRB BRAID COAT UD CP NO 2 27IN VCRL J195H

## (undated) DEVICE — DRESSING GZ W3XL16IN CELOS ACETT OIL EMUL N ADH

## (undated) DEVICE — SUTURE ETHBND EXCEL SZ 5 L30IN NONABSORBABLE GRN L40MM V-37 MB66G

## (undated) DEVICE — NDL CNTR 40CT FM MAG: Brand: MEDLINE INDUSTRIES, INC.

## (undated) DEVICE — STRIP WND CLSR W1 4XL4IN POLYAMIDE MACROPOROUS NONWOVEN H2O

## (undated) DEVICE — 3M™ STERI-DRAPE™ INCISE DRAPE 1050 (60CM X 45CM): Brand: STERI-DRAPE™

## (undated) DEVICE — GAUZE,SPONGE,FLUFF,6"X6.75",STRL,5/TRAY: Brand: MEDLINE

## (undated) DEVICE — SUTURE MCRYL SZ 3-0 L27IN ABSRB UD PS-2 3/8 CIR REV CUT NDL MCP427H

## (undated) DEVICE — ZIPPERED TOGA, 2X LARGE: Brand: FLYTE, SURGICOOL

## (undated) DEVICE — BLANKET WRM W29.9XL79.1IN UP BODY FORC AIR MISTRAL-AIR